# Patient Record
Sex: MALE | Race: WHITE | NOT HISPANIC OR LATINO | Employment: FULL TIME | URBAN - NONMETROPOLITAN AREA
[De-identification: names, ages, dates, MRNs, and addresses within clinical notes are randomized per-mention and may not be internally consistent; named-entity substitution may affect disease eponyms.]

---

## 2017-01-01 ENCOUNTER — HOSPITAL ENCOUNTER (OUTPATIENT)
Dept: CARDIOLOGY | Facility: HOSPITAL | Age: 77
Discharge: HOME | End: 2017-07-19
Admitting: NURSE PRACTITIONER

## 2017-01-01 ENCOUNTER — OFFICE VISIT (OUTPATIENT)
Dept: CARDIOLOGY | Facility: CLINIC | Age: 77
End: 2017-01-01

## 2017-01-01 VITALS
DIASTOLIC BLOOD PRESSURE: 52 MMHG | SYSTOLIC BLOOD PRESSURE: 105 MMHG | BODY MASS INDEX: 24.34 KG/M2 | WEIGHT: 170 LBS | HEIGHT: 70 IN

## 2017-01-01 VITALS
HEIGHT: 70 IN | DIASTOLIC BLOOD PRESSURE: 59 MMHG | SYSTOLIC BLOOD PRESSURE: 99 MMHG | OXYGEN SATURATION: 98 % | HEART RATE: 72 BPM | BODY MASS INDEX: 24.39 KG/M2 | WEIGHT: 170.4 LBS

## 2017-01-01 DIAGNOSIS — E78.2 MIXED HYPERLIPIDEMIA: ICD-10-CM

## 2017-01-01 DIAGNOSIS — Z92.21 STATUS POST CHEMOTHERAPY: ICD-10-CM

## 2017-01-01 DIAGNOSIS — I25.10 CORONARY ARTERY DISEASE INVOLVING NATIVE CORONARY ARTERY OF NATIVE HEART WITHOUT ANGINA PECTORIS: Primary | ICD-10-CM

## 2017-01-01 DIAGNOSIS — R53.83 FATIGUE, UNSPECIFIED TYPE: ICD-10-CM

## 2017-01-01 DIAGNOSIS — I48.92 ATRIAL FLUTTER, UNSPECIFIED TYPE (HCC): ICD-10-CM

## 2017-01-01 DIAGNOSIS — I25.10 CORONARY ARTERY DISEASE INVOLVING NATIVE CORONARY ARTERY OF NATIVE HEART WITHOUT ANGINA PECTORIS: ICD-10-CM

## 2017-01-01 LAB
BH CV ECHO MEAS - AO MAX PG (FULL): 3 MMHG
BH CV ECHO MEAS - AO MAX PG: 7 MMHG
BH CV ECHO MEAS - AO MEAN PG (FULL): 2 MMHG
BH CV ECHO MEAS - AO MEAN PG: 4 MMHG
BH CV ECHO MEAS - AO ROOT AREA: 11.9 CM^2
BH CV ECHO MEAS - AO ROOT DIAM: 3.9 CM
BH CV ECHO MEAS - AO V2 MAX: 132 CM/SEC
BH CV ECHO MEAS - AO V2 MEAN: 86.9 CM/SEC
BH CV ECHO MEAS - AO V2 VTI: 30.8 CM
BH CV ECHO MEAS - AVA(I,A): 2.8 CM^2
BH CV ECHO MEAS - AVA(I,D): 2.8 CM^2
BH CV ECHO MEAS - AVA(V,A): 2.6 CM^2
BH CV ECHO MEAS - AVA(V,D): 2.6 CM^2
BH CV ECHO MEAS - CONTRAST EF 4CH: 55.6 ML/M^2
BH CV ECHO MEAS - EDV(CUBED): 140.6 ML
BH CV ECHO MEAS - EDV(MOD-SP4): 98.4 ML
BH CV ECHO MEAS - EDV(TEICH): 129.5 ML
BH CV ECHO MEAS - EF(CUBED): 75.1 %
BH CV ECHO MEAS - EF(MOD-SP4): 55.6 %
BH CV ECHO MEAS - EF(TEICH): 66.7 %
BH CV ECHO MEAS - ESV(CUBED): 35 ML
BH CV ECHO MEAS - ESV(MOD-SP4): 43.7 ML
BH CV ECHO MEAS - ESV(TEICH): 43.2 ML
BH CV ECHO MEAS - FS: 37.1 %
BH CV ECHO MEAS - IVS/LVPW: 0.94
BH CV ECHO MEAS - IVSD: 1 CM
BH CV ECHO MEAS - LA DIMENSION: 3.5 CM
BH CV ECHO MEAS - LA/AO: 0.9
BH CV ECHO MEAS - LAT PEAK E' VEL: 9.9 CM/SEC
BH CV ECHO MEAS - LV MASS(C)D: 205.9 GRAMS
BH CV ECHO MEAS - LV MAX PG: 3.9 MMHG
BH CV ECHO MEAS - LV MEAN PG: 2 MMHG
BH CV ECHO MEAS - LV V1 MAX: 99 CM/SEC
BH CV ECHO MEAS - LV V1 MEAN: 67.2 CM/SEC
BH CV ECHO MEAS - LV V1 VTI: 24.7 CM
BH CV ECHO MEAS - LVIDD: 5.2 CM
BH CV ECHO MEAS - LVIDS: 3.3 CM
BH CV ECHO MEAS - LVLD AP4: 7.8 CM
BH CV ECHO MEAS - LVLS AP4: 6.9 CM
BH CV ECHO MEAS - LVOT AREA (M): 3.5 CM^2
BH CV ECHO MEAS - LVOT AREA: 3.5 CM^2
BH CV ECHO MEAS - LVOT DIAM: 2.1 CM
BH CV ECHO MEAS - LVPWD: 1.1 CM
BH CV ECHO MEAS - MV A MAX VEL: 78.6 CM/SEC
BH CV ECHO MEAS - MV DEC TIME: 0.2 SEC
BH CV ECHO MEAS - MV E MAX VEL: 90.7 CM/SEC
BH CV ECHO MEAS - MV E/A: 1.2
BH CV ECHO MEAS - PI END-D VEL: 119 CM/SEC
BH CV ECHO MEAS - RAP SYSTOLE: 5 MMHG
BH CV ECHO MEAS - RVSP: 29.8 MMHG
BH CV ECHO MEAS - SV(AO): 367.9 ML
BH CV ECHO MEAS - SV(CUBED): 105.6 ML
BH CV ECHO MEAS - SV(LVOT): 85.6 ML
BH CV ECHO MEAS - SV(MOD-SP4): 54.7 ML
BH CV ECHO MEAS - SV(TEICH): 86.3 ML
BH CV ECHO MEAS - TR MAX VEL: 249 CM/SEC
E/E' RATIO: 11.7
LEFT ATRIUM VOLUME: 54 CM3
LV EF 2D ECHO EST: 60 %

## 2017-01-01 PROCEDURE — 99213 OFFICE O/P EST LOW 20 MIN: CPT | Performed by: NURSE PRACTITIONER

## 2017-01-01 PROCEDURE — 93306 TTE W/DOPPLER COMPLETE: CPT | Performed by: INTERNAL MEDICINE

## 2017-01-01 PROCEDURE — 0399T ADULT TRANSTHORACIC ECHO COMPLETE: CPT | Performed by: INTERNAL MEDICINE

## 2017-01-01 PROCEDURE — 93306 TTE W/DOPPLER COMPLETE: CPT

## 2017-01-01 PROCEDURE — 0399T HC MYOCARDL STRAIN IMAG QUAN ASSMT PER SESS: CPT

## 2017-01-01 PROCEDURE — 93000 ELECTROCARDIOGRAM COMPLETE: CPT | Performed by: NURSE PRACTITIONER

## 2017-01-01 RX ORDER — ASPIRIN 81 MG/1
81 TABLET ORAL DAILY
COMMUNITY
End: 2018-01-01 | Stop reason: HOSPADM

## 2017-04-05 PROBLEM — I25.10 CAD (CORONARY ARTERY DISEASE): Status: ACTIVE | Noted: 2017-04-05

## 2017-06-15 PROBLEM — I48.92 ATRIAL FLUTTER (HCC): Status: ACTIVE | Noted: 2017-01-01

## 2017-06-15 NOTE — PROGRESS NOTES
"    Subjective:     Encounter Date:06/15/2017      Patient ID: Jay Jones is a 76 y.o. male.    Chief Complaint:  HPI Comments: Pt reports he is feeling well aside from some fatigue which he attributes to his prostate cancer treatment/chemo. He denies chest pain, dyspnea, edema, weight gain, palpitations. He states he was asymptomatic prior to his CABG in 2006.    Coronary Artery Disease   Presents for follow-up visit. Pertinent negatives include no chest pain, chest pressure, chest tightness, dizziness, leg swelling, muscle weakness, palpitations, shortness of breath or weight gain. The symptoms have been stable. Compliance with diet is variable. Compliance with exercise is variable. Compliance with medications is good.       The following portions of the patient's history were reviewed and updated as appropriate: allergies, current medications, past family history, past medical history, past social history, past surgical history and problem list.  BP 99/59 (BP Location: Right arm, Patient Position: Sitting, Cuff Size: Adult)  Pulse 72  Ht 70\" (177.8 cm)  Wt 170 lb 6.4 oz (77.3 kg)  SpO2 98%  BMI 24.45 kg/m2  Allergies   Allergen Reactions   • Codeine    • Percocet [Oxycodone-Acetaminophen]        Current Outpatient Prescriptions:   •  aspirin 81 MG EC tablet, Take 81 mg by mouth Daily., Disp: , Rfl:   •  CALCIUM PO, Take  by mouth., Disp: , Rfl:   •  furosemide (LASIX) 40 MG tablet, TAKE 1 TABLET BY MOUTH AS NEEDED EDEMA, Disp: , Rfl: 2  •  KLOR-CON 10 MEQ CR tablet, TAKE 1 TABLET DAILY, Disp: , Rfl: 6  •  metFORMIN XR (GLUCOPHAGE-XR) 500 MG 24 hr tablet, TAKE 1 TABLET TWICE A DAY, Disp: , Rfl: 3  •  rosuvastatin (CRESTOR) 40 MG tablet, TAKE 1 TABLET BY MOUTH EVERY NIGHT AT BEDTIME, Disp: , Rfl: 6  •  SYMBICORT 160-4.5 MCG/ACT inhaler, USE 2 PUFFS DAILY, Disp: , Rfl: 7  •  valsartan (DIOVAN) 40 MG tablet, TAKE 1 TABLET EVERY DAY, Disp: , Rfl: 3  Past Medical History:   Diagnosis Date   • Asthma    • Cancer  "   • Coronary artery disease    • HLD (hyperlipidemia)      Past Surgical History:   Procedure Laterality Date   • APPENDECTOMY     • CARDIAC CATHETERIZATION     • CORONARY ARTERY BYPASS GRAFT     • PROSTATECTOMY       Social History     Social History   • Marital status:      Spouse name: N/A   • Number of children: N/A   • Years of education: N/A     Occupational History   • Not on file.     Social History Main Topics   • Smoking status: Light Tobacco Smoker     Types: Cigars   • Smokeless tobacco: Never Used   • Alcohol use No   • Drug use: No   • Sexual activity: Defer     Other Topics Concern   • Not on file     Social History Narrative     Family History   Problem Relation Age of Onset   • Heart disease Father    • Heart attack Father    • Heart failure Father    • Heart disease Maternal Grandfather    • Alzheimer's disease Mother        Review of Systems   Constitution: Positive for weakness, malaise/fatigue and weight loss. Negative for chills, diaphoresis, fever and weight gain.   HENT: Negative for nosebleeds.    Eyes: Negative for visual disturbance.   Cardiovascular: Negative for chest pain, claudication, cyanosis, dyspnea on exertion, irregular heartbeat, leg swelling, near-syncope, orthopnea, palpitations, paroxysmal nocturnal dyspnea and syncope.   Respiratory: Negative for chest tightness, cough, hemoptysis, shortness of breath, sputum production and wheezing.    Hematologic/Lymphatic: Negative for bleeding problem.   Skin: Negative for color change and flushing.   Musculoskeletal: Negative for falls and muscle weakness.   Gastrointestinal: Negative for bloating, abdominal pain, hematemesis, hematochezia, melena, nausea and vomiting.   Genitourinary: Negative for hematuria.   Neurological: Negative for dizziness and light-headedness.   Psychiatric/Behavioral: Negative for altered mental status.         ECG 12 Lead  Date/Time: 6/15/2017 2:05 PM  Performed by: KE BARNEY  Authorized by: ECOTR  KE BELTRÁN   Comparison: compared with previous ECG from 4/11/2016  Similar to previous ECG  Rhythm: sinus rhythm               Objective:     Physical Exam   Constitutional: He is oriented to person, place, and time. He appears well-developed and well-nourished. No distress.   HENT:   Head: Normocephalic and atraumatic.   Eyes: Pupils are equal, round, and reactive to light.   Neck: Normal range of motion. Neck supple. No JVD present. No thyromegaly present.   Cardiovascular: Normal rate, regular rhythm, normal heart sounds and intact distal pulses.  Exam reveals no gallop and no friction rub.    No murmur heard.  Pulses:       Dorsalis pedis pulses are 2+ on the right side, and 2+ on the left side.   Pulmonary/Chest: Effort normal and breath sounds normal. No respiratory distress. He has no wheezes. He has no rales. He exhibits no tenderness.   Abdominal: Soft. Bowel sounds are normal. He exhibits no distension. There is no tenderness.   Musculoskeletal: Normal range of motion. He exhibits no edema.   Neurological: He is alert and oriented to person, place, and time. No cranial nerve deficit.   Skin: Skin is warm and dry. He is not diaphoretic.   Psychiatric: He has a normal mood and affect. His behavior is normal.       Lab Review:       Assessment:          Diagnosis Plan   1. Coronary artery disease involving native coronary artery of native heart without angina pectoris  Adult Transthoracic Echo Complete With Contrast  S/p CABG 2006  Stable, no angina  Negative stress echo 11/2016  Check echo due to worsening fatigue, chemotherapy treatment    2. Status post chemotherapy  Adult Transthoracic Echo Complete With Contrast  For prostate cancer    3. Fatigue, unspecified type  Adult Transthoracic Echo Complete With Contrast  He relates this to his prostate cancer treatment/chemo- has been on chemo 4 months    4. Mixed hyperlipidemia  On statin followed by pcp    5. Atrial flutter, unspecified type  Remote, s/p  cardioversion 2007, maintaining NSR          Plan:         Follow up 1 year, sooner with new or worsening symptoms.

## 2018-01-01 ENCOUNTER — INFUSION (OUTPATIENT)
Dept: ONCOLOGY | Facility: HOSPITAL | Age: 78
End: 2018-01-01
Attending: INTERNAL MEDICINE

## 2018-01-01 ENCOUNTER — HOSPITAL ENCOUNTER (INPATIENT)
Facility: HOSPITAL | Age: 78
LOS: 2 days | Discharge: HOSPICE | End: 2018-06-10
Attending: NEUROLOGICAL SURGERY | Admitting: NEUROLOGICAL SURGERY

## 2018-01-01 ENCOUNTER — OFFICE VISIT (OUTPATIENT)
Dept: ONCOLOGY | Facility: CLINIC | Age: 78
End: 2018-01-01

## 2018-01-01 ENCOUNTER — TELEPHONE (OUTPATIENT)
Dept: ONCOLOGY | Facility: CLINIC | Age: 78
End: 2018-01-01

## 2018-01-01 ENCOUNTER — APPOINTMENT (OUTPATIENT)
Dept: GENERAL RADIOLOGY | Facility: HOSPITAL | Age: 78
End: 2018-01-01

## 2018-01-01 ENCOUNTER — APPOINTMENT (OUTPATIENT)
Dept: ONCOLOGY | Facility: HOSPITAL | Age: 78
End: 2018-01-01
Attending: INTERNAL MEDICINE

## 2018-01-01 ENCOUNTER — CONSULT (OUTPATIENT)
Dept: ONCOLOGY | Facility: CLINIC | Age: 78
End: 2018-01-01

## 2018-01-01 ENCOUNTER — APPOINTMENT (OUTPATIENT)
Dept: CARDIOLOGY | Facility: HOSPITAL | Age: 78
End: 2018-01-01
Attending: INTERNAL MEDICINE

## 2018-01-01 ENCOUNTER — HOSPITAL ENCOUNTER (OUTPATIENT)
Dept: NUCLEAR MEDICINE | Facility: HOSPITAL | Age: 78
Discharge: HOME | End: 2018-05-01
Attending: INTERNAL MEDICINE

## 2018-01-01 ENCOUNTER — LAB (OUTPATIENT)
Dept: LAB | Facility: HOSPITAL | Age: 78
End: 2018-01-01
Attending: INTERNAL MEDICINE

## 2018-01-01 ENCOUNTER — APPOINTMENT (OUTPATIENT)
Dept: ULTRASOUND IMAGING | Facility: HOSPITAL | Age: 78
End: 2018-01-01

## 2018-01-01 ENCOUNTER — INFUSION (OUTPATIENT)
Dept: ONCOLOGY | Facility: HOSPITAL | Age: 78
End: 2018-01-01

## 2018-01-01 ENCOUNTER — HOSPITAL ENCOUNTER (OUTPATIENT)
Dept: CT IMAGING | Facility: HOSPITAL | Age: 78
Discharge: HOME | End: 2018-05-01
Attending: INTERNAL MEDICINE | Admitting: INTERNAL MEDICINE

## 2018-01-01 ENCOUNTER — LAB (OUTPATIENT)
Dept: LAB | Facility: HOSPITAL | Age: 78
End: 2018-01-01

## 2018-01-01 ENCOUNTER — HOSPITAL ENCOUNTER (INPATIENT)
Facility: HOSPITAL | Age: 78
LOS: 8 days | Discharge: HOME HEALTH | End: 2018-05-27
Attending: INTERNAL MEDICINE | Admitting: INTERNAL MEDICINE

## 2018-01-01 VITALS
RESPIRATION RATE: 18 BRPM | HEIGHT: 70 IN | WEIGHT: 151.5 LBS | SYSTOLIC BLOOD PRESSURE: 106 MMHG | BODY MASS INDEX: 21.69 KG/M2 | HEART RATE: 85 BPM | DIASTOLIC BLOOD PRESSURE: 68 MMHG | TEMPERATURE: 97.4 F | OXYGEN SATURATION: 98 %

## 2018-01-01 VITALS
SYSTOLIC BLOOD PRESSURE: 105 MMHG | BODY MASS INDEX: 21.76 KG/M2 | HEIGHT: 70 IN | WEIGHT: 152 LBS | DIASTOLIC BLOOD PRESSURE: 50 MMHG | RESPIRATION RATE: 18 BRPM | OXYGEN SATURATION: 100 % | HEART RATE: 69 BPM | TEMPERATURE: 97.3 F

## 2018-01-01 VITALS
WEIGHT: 139.8 LBS | TEMPERATURE: 97.3 F | DIASTOLIC BLOOD PRESSURE: 68 MMHG | BODY MASS INDEX: 20.01 KG/M2 | OXYGEN SATURATION: 89 % | SYSTOLIC BLOOD PRESSURE: 102 MMHG | RESPIRATION RATE: 16 BRPM | HEIGHT: 70 IN | HEART RATE: 100 BPM

## 2018-01-01 VITALS
HEART RATE: 73 BPM | SYSTOLIC BLOOD PRESSURE: 114 MMHG | WEIGHT: 154 LBS | OXYGEN SATURATION: 100 % | RESPIRATION RATE: 20 BRPM | BODY MASS INDEX: 22.05 KG/M2 | DIASTOLIC BLOOD PRESSURE: 47 MMHG | TEMPERATURE: 98.1 F | HEIGHT: 70 IN

## 2018-01-01 VITALS
HEART RATE: 93 BPM | HEIGHT: 70 IN | WEIGHT: 151.2 LBS | OXYGEN SATURATION: 100 % | SYSTOLIC BLOOD PRESSURE: 105 MMHG | DIASTOLIC BLOOD PRESSURE: 50 MMHG | RESPIRATION RATE: 18 BRPM | TEMPERATURE: 97.8 F | BODY MASS INDEX: 21.64 KG/M2

## 2018-01-01 VITALS
DIASTOLIC BLOOD PRESSURE: 39 MMHG | HEART RATE: 82 BPM | BODY MASS INDEX: 25.66 KG/M2 | TEMPERATURE: 97.9 F | HEIGHT: 61 IN | OXYGEN SATURATION: 94 % | SYSTOLIC BLOOD PRESSURE: 104 MMHG | WEIGHT: 135.9 LBS | RESPIRATION RATE: 18 BRPM

## 2018-01-01 VITALS
RESPIRATION RATE: 18 BRPM | BODY MASS INDEX: 21.62 KG/M2 | DIASTOLIC BLOOD PRESSURE: 85 MMHG | TEMPERATURE: 99 F | WEIGHT: 151 LBS | OXYGEN SATURATION: 95 % | HEIGHT: 70 IN | HEART RATE: 92 BPM | SYSTOLIC BLOOD PRESSURE: 113 MMHG

## 2018-01-01 VITALS
WEIGHT: 151 LBS | SYSTOLIC BLOOD PRESSURE: 116 MMHG | HEART RATE: 84 BPM | TEMPERATURE: 97.3 F | HEIGHT: 70 IN | BODY MASS INDEX: 21.62 KG/M2 | OXYGEN SATURATION: 100 % | DIASTOLIC BLOOD PRESSURE: 48 MMHG | RESPIRATION RATE: 20 BRPM

## 2018-01-01 VITALS
RESPIRATION RATE: 16 BRPM | DIASTOLIC BLOOD PRESSURE: 71 MMHG | BODY MASS INDEX: 20.62 KG/M2 | TEMPERATURE: 97.7 F | SYSTOLIC BLOOD PRESSURE: 114 MMHG | HEIGHT: 70 IN | OXYGEN SATURATION: 83 % | WEIGHT: 144 LBS | HEIGHT: 70 IN | TEMPERATURE: 97.7 F | RESPIRATION RATE: 18 BRPM | HEART RATE: 91 BPM | WEIGHT: 149 LBS | DIASTOLIC BLOOD PRESSURE: 57 MMHG | HEART RATE: 88 BPM | SYSTOLIC BLOOD PRESSURE: 105 MMHG | BODY MASS INDEX: 21.33 KG/M2 | OXYGEN SATURATION: 100 %

## 2018-01-01 VITALS
RESPIRATION RATE: 20 BRPM | TEMPERATURE: 99 F | HEART RATE: 94 BPM | HEIGHT: 70 IN | OXYGEN SATURATION: 96 % | BODY MASS INDEX: 20.59 KG/M2 | WEIGHT: 143.8 LBS | SYSTOLIC BLOOD PRESSURE: 106 MMHG | DIASTOLIC BLOOD PRESSURE: 48 MMHG

## 2018-01-01 VITALS
OXYGEN SATURATION: 100 % | WEIGHT: 149 LBS | HEIGHT: 70 IN | SYSTOLIC BLOOD PRESSURE: 106 MMHG | HEART RATE: 84 BPM | DIASTOLIC BLOOD PRESSURE: 74 MMHG | BODY MASS INDEX: 21.33 KG/M2 | TEMPERATURE: 97.8 F | RESPIRATION RATE: 18 BRPM

## 2018-01-01 VITALS
WEIGHT: 151 LBS | TEMPERATURE: 97.5 F | RESPIRATION RATE: 18 BRPM | OXYGEN SATURATION: 100 % | HEIGHT: 70 IN | SYSTOLIC BLOOD PRESSURE: 106 MMHG | HEART RATE: 73 BPM | DIASTOLIC BLOOD PRESSURE: 40 MMHG | BODY MASS INDEX: 21.62 KG/M2

## 2018-01-01 VITALS
OXYGEN SATURATION: 100 % | HEART RATE: 96 BPM | SYSTOLIC BLOOD PRESSURE: 107 MMHG | RESPIRATION RATE: 18 BRPM | BODY MASS INDEX: 21.05 KG/M2 | DIASTOLIC BLOOD PRESSURE: 40 MMHG | WEIGHT: 147 LBS | TEMPERATURE: 97.9 F | HEIGHT: 70 IN

## 2018-01-01 VITALS
HEART RATE: 84 BPM | DIASTOLIC BLOOD PRESSURE: 84 MMHG | RESPIRATION RATE: 16 BRPM | WEIGHT: 151 LBS | SYSTOLIC BLOOD PRESSURE: 126 MMHG | BODY MASS INDEX: 21.62 KG/M2 | TEMPERATURE: 97.5 F | OXYGEN SATURATION: 96 % | HEIGHT: 70 IN

## 2018-01-01 VITALS
HEART RATE: 80 BPM | BODY MASS INDEX: 19.71 KG/M2 | DIASTOLIC BLOOD PRESSURE: 58 MMHG | TEMPERATURE: 96.8 F | HEIGHT: 70 IN | SYSTOLIC BLOOD PRESSURE: 108 MMHG | WEIGHT: 137.7 LBS | RESPIRATION RATE: 16 BRPM | OXYGEN SATURATION: 94 %

## 2018-01-01 VITALS
OXYGEN SATURATION: 91 % | HEART RATE: 89 BPM | DIASTOLIC BLOOD PRESSURE: 56 MMHG | TEMPERATURE: 99.1 F | WEIGHT: 144.8 LBS | HEIGHT: 70 IN | BODY MASS INDEX: 20.73 KG/M2 | SYSTOLIC BLOOD PRESSURE: 92 MMHG | RESPIRATION RATE: 18 BRPM

## 2018-01-01 VITALS
BODY MASS INDEX: 21.11 KG/M2 | HEART RATE: 70 BPM | HEIGHT: 70 IN | RESPIRATION RATE: 18 BRPM | SYSTOLIC BLOOD PRESSURE: 94 MMHG | WEIGHT: 147.5 LBS | TEMPERATURE: 98 F | DIASTOLIC BLOOD PRESSURE: 60 MMHG

## 2018-01-01 VITALS
DIASTOLIC BLOOD PRESSURE: 50 MMHG | HEIGHT: 70 IN | OXYGEN SATURATION: 99 % | TEMPERATURE: 97.3 F | WEIGHT: 151 LBS | HEART RATE: 72 BPM | RESPIRATION RATE: 16 BRPM | BODY MASS INDEX: 21.62 KG/M2 | SYSTOLIC BLOOD PRESSURE: 96 MMHG

## 2018-01-01 VITALS
OXYGEN SATURATION: 99 % | HEART RATE: 73 BPM | DIASTOLIC BLOOD PRESSURE: 50 MMHG | SYSTOLIC BLOOD PRESSURE: 100 MMHG | RESPIRATION RATE: 16 BRPM | HEIGHT: 70 IN | TEMPERATURE: 97.1 F | BODY MASS INDEX: 19.61 KG/M2 | WEIGHT: 137 LBS

## 2018-01-01 VITALS
TEMPERATURE: 97.8 F | RESPIRATION RATE: 18 BRPM | BODY MASS INDEX: 21.45 KG/M2 | DIASTOLIC BLOOD PRESSURE: 70 MMHG | HEIGHT: 70 IN | SYSTOLIC BLOOD PRESSURE: 116 MMHG | OXYGEN SATURATION: 98 % | WEIGHT: 149.8 LBS | HEART RATE: 80 BPM

## 2018-01-01 DIAGNOSIS — C61 MALIGNANT NEOPLASM OF PROSTATE (HCC): Primary | ICD-10-CM

## 2018-01-01 DIAGNOSIS — D64.9 ANEMIA, UNSPECIFIED TYPE: ICD-10-CM

## 2018-01-01 DIAGNOSIS — C79.51 PROSTATE CANCER METASTATIC TO BONE (HCC): Primary | ICD-10-CM

## 2018-01-01 DIAGNOSIS — C79.51 PROSTATE CANCER METASTATIC TO BONE (HCC): ICD-10-CM

## 2018-01-01 DIAGNOSIS — E86.0 DEHYDRATION: ICD-10-CM

## 2018-01-01 DIAGNOSIS — C61 PROSTATE CANCER METASTATIC TO BONE (HCC): Primary | ICD-10-CM

## 2018-01-01 DIAGNOSIS — R00.2 RAPID PALPITATIONS: Primary | ICD-10-CM

## 2018-01-01 DIAGNOSIS — C61 PROSTATE CANCER METASTATIC TO BONE (HCC): ICD-10-CM

## 2018-01-01 DIAGNOSIS — T45.1X5A ANTINEOPLASTIC CHEMOTHERAPY INDUCED ANEMIA: ICD-10-CM

## 2018-01-01 DIAGNOSIS — T45.1X5A ANTINEOPLASTIC CHEMOTHERAPY INDUCED ANEMIA: Primary | ICD-10-CM

## 2018-01-01 DIAGNOSIS — E86.0 DEHYDRATION: Primary | ICD-10-CM

## 2018-01-01 DIAGNOSIS — E78.2 MIXED HYPERLIPIDEMIA: Primary | ICD-10-CM

## 2018-01-01 DIAGNOSIS — D64.81 ANTINEOPLASTIC CHEMOTHERAPY INDUCED ANEMIA: Primary | ICD-10-CM

## 2018-01-01 DIAGNOSIS — E83.51 HYPOCALCEMIA: Primary | ICD-10-CM

## 2018-01-01 DIAGNOSIS — C61 PROSTATE CA (HCC): ICD-10-CM

## 2018-01-01 DIAGNOSIS — R60.1 GENERALIZED EDEMA: ICD-10-CM

## 2018-01-01 DIAGNOSIS — R53.81 PHYSICAL DECONDITIONING: Primary | ICD-10-CM

## 2018-01-01 DIAGNOSIS — C61 MALIGNANT NEOPLASM OF PROSTATE (HCC): ICD-10-CM

## 2018-01-01 DIAGNOSIS — D64.81 ANTINEOPLASTIC CHEMOTHERAPY INDUCED ANEMIA: ICD-10-CM

## 2018-01-01 DIAGNOSIS — G93.89 BRAIN MASS: ICD-10-CM

## 2018-01-01 DIAGNOSIS — D50.9 IRON DEFICIENCY ANEMIA, UNSPECIFIED IRON DEFICIENCY ANEMIA TYPE: Primary | ICD-10-CM

## 2018-01-01 DIAGNOSIS — R00.2 HEART PALPITATIONS: ICD-10-CM

## 2018-01-01 DIAGNOSIS — C61 PROSTATE CA (HCC): Primary | ICD-10-CM

## 2018-01-01 DIAGNOSIS — D50.9 IRON DEFICIENCY ANEMIA, UNSPECIFIED IRON DEFICIENCY ANEMIA TYPE: ICD-10-CM

## 2018-01-01 DIAGNOSIS — R13.12 OROPHARYNGEAL DYSPHAGIA: Primary | ICD-10-CM

## 2018-01-01 DIAGNOSIS — D64.9 ANEMIA, UNSPECIFIED TYPE: Primary | ICD-10-CM

## 2018-01-01 LAB
ABO + RH BLD: NORMAL
ABO + RH BLD: NORMAL
ABO GROUP BLD: NORMAL
ABO GROUP BLD: NORMAL
ALBUMIN FLD-MCNC: 1.2 G/DL
ALBUMIN SERPL-MCNC: 2.5 G/DL (ref 3.5–5)
ALBUMIN SERPL-MCNC: 2.9 G/DL (ref 3.5–5)
ALBUMIN SERPL-MCNC: 3 G/DL (ref 3.5–5)
ALBUMIN SERPL-MCNC: 3 G/DL (ref 3.5–5)
ALBUMIN SERPL-MCNC: 3.2 G/DL (ref 3.5–5)
ALBUMIN SERPL-MCNC: 3.3 G/DL (ref 3.5–5)
ALBUMIN SERPL-MCNC: 3.3 G/DL (ref 3.5–5)
ALBUMIN/GLOB SERPL: 1 G/DL (ref 1.1–2.5)
ALBUMIN/GLOB SERPL: 1.1 G/DL (ref 1.1–2.5)
ALBUMIN/GLOB SERPL: 1.2 G/DL (ref 1.1–2.5)
ALBUMIN/GLOB SERPL: 1.2 G/DL (ref 1.1–2.5)
ALBUMIN/GLOB SERPL: 1.3 G/DL (ref 1.1–2.5)
ALP SERPL-CCNC: 389 U/L (ref 24–120)
ALP SERPL-CCNC: 407 U/L (ref 24–120)
ALP SERPL-CCNC: 426 U/L (ref 24–120)
ALP SERPL-CCNC: 440 U/L (ref 24–120)
ALP SERPL-CCNC: 469 U/L (ref 24–120)
ALP SERPL-CCNC: 473 U/L (ref 24–120)
ALP SERPL-CCNC: 483 U/L (ref 24–120)
ALP SERPL-CCNC: 639 U/L (ref 24–120)
ALP SERPL-CCNC: 658 U/L (ref 24–120)
ALT SERPL W P-5'-P-CCNC: 18 U/L (ref 0–54)
ALT SERPL W P-5'-P-CCNC: 21 U/L (ref 0–54)
ALT SERPL W P-5'-P-CCNC: 25 U/L (ref 0–54)
ALT SERPL W P-5'-P-CCNC: 25 U/L (ref 0–54)
ALT SERPL W P-5'-P-CCNC: 28 U/L (ref 0–54)
ALT SERPL W P-5'-P-CCNC: 30 U/L (ref 0–54)
ALT SERPL W P-5'-P-CCNC: 32 U/L (ref 0–54)
ALT SERPL W P-5'-P-CCNC: 36 U/L (ref 0–54)
ALT SERPL W P-5'-P-CCNC: 40 U/L (ref 0–54)
AMYLASE FLD-CCNC: 23 U/L
ANION GAP SERPL CALCULATED.3IONS-SCNC: 10 MMOL/L (ref 4–13)
ANION GAP SERPL CALCULATED.3IONS-SCNC: 11 MMOL/L (ref 4–13)
ANION GAP SERPL CALCULATED.3IONS-SCNC: 4 MMOL/L (ref 4–13)
ANION GAP SERPL CALCULATED.3IONS-SCNC: 4 MMOL/L (ref 4–13)
ANION GAP SERPL CALCULATED.3IONS-SCNC: 5 MMOL/L (ref 4–13)
ANION GAP SERPL CALCULATED.3IONS-SCNC: 5 MMOL/L (ref 4–13)
ANION GAP SERPL CALCULATED.3IONS-SCNC: 6 MMOL/L (ref 4–13)
ANION GAP SERPL CALCULATED.3IONS-SCNC: 8 MMOL/L (ref 4–13)
ANION GAP SERPL CALCULATED.3IONS-SCNC: 8 MMOL/L (ref 4–13)
ANION GAP SERPL CALCULATED.3IONS-SCNC: 9 MMOL/L (ref 4–13)
ANISOCYTOSIS BLD QL: ABNORMAL
APPEARANCE FLD: CLEAR
ARTERIAL PATENCY WRIST A: POSITIVE
ARTICHOKE IGE QN: 96 MG/DL (ref 0–99)
AST SERPL-CCNC: 126 U/L (ref 7–45)
AST SERPL-CCNC: 177 U/L (ref 7–45)
AST SERPL-CCNC: 212 U/L (ref 7–45)
AST SERPL-CCNC: 38 U/L (ref 7–45)
AST SERPL-CCNC: 42 U/L (ref 7–45)
AST SERPL-CCNC: 47 U/L (ref 7–45)
AST SERPL-CCNC: 49 U/L (ref 7–45)
AST SERPL-CCNC: 54 U/L (ref 7–45)
AST SERPL-CCNC: 60 U/L (ref 7–45)
ATMOSPHERIC PRESS: 752 MMHG
AUTO MIXED CELLS #: 0.8 10*3/MM3 (ref 0.1–2.6)
AUTO MIXED CELLS %: 18.1 % (ref 0.1–24)
BACTERIA FLD CULT: NORMAL
BACTERIA SPEC ANAEROBE CULT: NORMAL
BACTERIA UR QL AUTO: ABNORMAL /HPF
BASE EXCESS BLDA CALC-SCNC: -0.3 MMOL/L (ref 0–2)
BASO STIPL COARSE BLD QL SMEAR: ABNORMAL
BASOPHILS # BLD AUTO: 0.02 10*3/MM3 (ref 0–0.2)
BASOPHILS # BLD AUTO: 0.02 10*3/MM3 (ref 0–0.2)
BASOPHILS # BLD MANUAL: 0.07 10*3/MM3 (ref 0–0.2)
BASOPHILS NFR BLD AUTO: 0.3 % (ref 0–2)
BASOPHILS NFR BLD AUTO: 0.3 % (ref 0–2)
BASOPHILS NFR BLD AUTO: 1 % (ref 0–2)
BDY SITE: ABNORMAL
BH BB BLOOD EXPIRATION DATE: NORMAL
BH BB BLOOD EXPIRATION DATE: NORMAL
BH BB BLOOD TYPE BARCODE: 7300
BH BB BLOOD TYPE BARCODE: 7300
BH BB DISPENSE STATUS: NORMAL
BH BB DISPENSE STATUS: NORMAL
BH BB PRODUCT CODE: NORMAL
BH BB PRODUCT CODE: NORMAL
BH BB UNIT NUMBER: NORMAL
BH BB UNIT NUMBER: NORMAL
BH CV ECHO MEAS - AO MAX PG (FULL): 1.8 MMHG
BH CV ECHO MEAS - AO MAX PG: 7.1 MMHG
BH CV ECHO MEAS - AO MEAN PG (FULL): 1 MMHG
BH CV ECHO MEAS - AO MEAN PG: 3 MMHG
BH CV ECHO MEAS - AO ROOT AREA (BSA CORRECTED): 1.6
BH CV ECHO MEAS - AO ROOT AREA: 6.2 CM^2
BH CV ECHO MEAS - AO ROOT DIAM: 2.8 CM
BH CV ECHO MEAS - AO V2 MAX: 133 CM/SEC
BH CV ECHO MEAS - AO V2 MEAN: 78.6 CM/SEC
BH CV ECHO MEAS - AO V2 VTI: 30.9 CM
BH CV ECHO MEAS - AVA(I,A): 3.6 CM^2
BH CV ECHO MEAS - AVA(I,D): 3.6 CM^2
BH CV ECHO MEAS - AVA(V,A): 3.9 CM^2
BH CV ECHO MEAS - AVA(V,D): 3.9 CM^2
BH CV ECHO MEAS - BSA(HAYCOCK): 1.8 M^2
BH CV ECHO MEAS - BSA: 1.8 M^2
BH CV ECHO MEAS - BZI_BMI: 20.4 KILOGRAMS/M^2
BH CV ECHO MEAS - BZI_METRIC_HEIGHT: 177.8 CM
BH CV ECHO MEAS - BZI_METRIC_WEIGHT: 64.4 KG
BH CV ECHO MEAS - CONTRAST EF 4CH: 59 ML/M^2
BH CV ECHO MEAS - EDV(CUBED): 132.7 ML
BH CV ECHO MEAS - EDV(MOD-SP4): 82.2 ML
BH CV ECHO MEAS - EDV(TEICH): 123.8 ML
BH CV ECHO MEAS - EF(CUBED): 78.8 %
BH CV ECHO MEAS - EF(MOD-SP4): 59 %
BH CV ECHO MEAS - EF(TEICH): 70.8 %
BH CV ECHO MEAS - ESV(CUBED): 28.1 ML
BH CV ECHO MEAS - ESV(MOD-SP4): 33.7 ML
BH CV ECHO MEAS - ESV(TEICH): 36.2 ML
BH CV ECHO MEAS - FS: 40.4 %
BH CV ECHO MEAS - IVS/LVPW: 1.3
BH CV ECHO MEAS - IVSD: 1.2 CM
BH CV ECHO MEAS - LA DIMENSION: 3.7 CM
BH CV ECHO MEAS - LA/AO: 1.3
BH CV ECHO MEAS - LAT PEAK E' VEL: 13.8 CM/SEC
BH CV ECHO MEAS - LV DIASTOLIC VOL/BSA (35-75): 45.6 ML/M^2
BH CV ECHO MEAS - LV MASS(C)D: 206.4 GRAMS
BH CV ECHO MEAS - LV MASS(C)DI: 114.4 GRAMS/M^2
BH CV ECHO MEAS - LV MAX PG: 5.3 MMHG
BH CV ECHO MEAS - LV MEAN PG: 2 MMHG
BH CV ECHO MEAS - LV SYSTOLIC VOL/BSA (12-30): 18.7 ML/M^2
BH CV ECHO MEAS - LV V1 MAX: 115 CM/SEC
BH CV ECHO MEAS - LV V1 MEAN: 58.1 CM/SEC
BH CV ECHO MEAS - LV V1 VTI: 24.9 CM
BH CV ECHO MEAS - LVIDD: 5.1 CM
BH CV ECHO MEAS - LVIDS: 3 CM
BH CV ECHO MEAS - LVLD AP4: 6.2 CM
BH CV ECHO MEAS - LVLS AP4: 5.5 CM
BH CV ECHO MEAS - LVOT AREA (M): 4.5 CM^2
BH CV ECHO MEAS - LVOT AREA: 4.5 CM^2
BH CV ECHO MEAS - LVOT DIAM: 2.4 CM
BH CV ECHO MEAS - LVPWD: 0.94 CM
BH CV ECHO MEAS - MED PEAK E' VEL: 9.25 CM/SEC
BH CV ECHO MEAS - MV DEC TIME: 0.19 SEC
BH CV ECHO MEAS - MV E MAX VEL: 132 CM/SEC
BH CV ECHO MEAS - RAP SYSTOLE: 5 MMHG
BH CV ECHO MEAS - RVDD: 3.6 CM
BH CV ECHO MEAS - RVSP: 36.6 MMHG
BH CV ECHO MEAS - SI(AO): 105.4 ML/M^2
BH CV ECHO MEAS - SI(CUBED): 57.9 ML/M^2
BH CV ECHO MEAS - SI(LVOT): 62.4 ML/M^2
BH CV ECHO MEAS - SI(MOD-SP4): 26.9 ML/M^2
BH CV ECHO MEAS - SI(TEICH): 48.6 ML/M^2
BH CV ECHO MEAS - SV(AO): 190.3 ML
BH CV ECHO MEAS - SV(CUBED): 104.6 ML
BH CV ECHO MEAS - SV(LVOT): 112.6 ML
BH CV ECHO MEAS - SV(MOD-SP4): 48.5 ML
BH CV ECHO MEAS - SV(TEICH): 87.7 ML
BH CV ECHO MEAS - TR MAX VEL: 281 CM/SEC
BH CV ECHO MEASUREMENTS AVERAGE E/E' RATIO: 11.45
BILIRUB SERPL-MCNC: 0.3 MG/DL (ref 0.1–1)
BILIRUB SERPL-MCNC: 0.4 MG/DL (ref 0.1–1)
BILIRUB SERPL-MCNC: 0.5 MG/DL (ref 0.1–1)
BILIRUB SERPL-MCNC: 0.6 MG/DL (ref 0.1–1)
BILIRUB SERPL-MCNC: 0.7 MG/DL (ref 0.1–1)
BILIRUB UR QL STRIP: ABNORMAL
BLASTS NFR BLD MANUAL: 1 % (ref 0–0)
BLD GP AB SCN SERPL QL: NEGATIVE
BLD GP AB SCN SERPL QL: NEGATIVE
BODY TEMPERATURE: 37 C
BUN BLD-MCNC: 15 MG/DL (ref 5–21)
BUN BLD-MCNC: 17 MG/DL (ref 5–21)
BUN BLD-MCNC: 17 MG/DL (ref 5–21)
BUN BLD-MCNC: 18 MG/DL (ref 5–21)
BUN BLD-MCNC: 18 MG/DL (ref 5–21)
BUN BLD-MCNC: 19 MG/DL (ref 5–21)
BUN BLD-MCNC: 24 MG/DL (ref 5–21)
BUN BLD-MCNC: 25 MG/DL (ref 5–21)
BUN BLD-MCNC: 27 MG/DL (ref 5–21)
BUN BLD-MCNC: 27 MG/DL (ref 5–21)
BUN BLD-MCNC: 29 MG/DL (ref 5–21)
BUN BLD-MCNC: 29 MG/DL (ref 5–21)
BUN BLD-MCNC: 35 MG/DL (ref 5–21)
BUN BLD-MCNC: 35 MG/DL (ref 5–21)
BUN/CREAT SERPL: 24.6 (ref 7–25)
BUN/CREAT SERPL: 26.2
BUN/CREAT SERPL: 29.5 (ref 7–25)
BUN/CREAT SERPL: 30.4 (ref 7–25)
BUN/CREAT SERPL: 40 (ref 7–25)
BUN/CREAT SERPL: 40.4 (ref 7–25)
BUN/CREAT SERPL: 42 (ref 7–25)
BUN/CREAT SERPL: 45 (ref 7–25)
BUN/CREAT SERPL: 49 (ref 7–25)
BUN/CREAT SERPL: 49.3 (ref 7–25)
BUN/CREAT SERPL: 54.3 (ref 7–25)
BUN/CREAT SERPL: 58 (ref 7–25)
BUN/CREAT SERPL: 58.3 (ref 7–25)
BUN/CREAT SERPL: 60 (ref 7–25)
CA-I BLD-MCNC: 3.77 MG/DL (ref 4.6–5.4)
CALCIUM SPEC-SCNC: 6.6 MG/DL (ref 8.4–10.4)
CALCIUM SPEC-SCNC: 6.6 MG/DL (ref 8.4–10.4)
CALCIUM SPEC-SCNC: 6.7 MG/DL (ref 8.4–10.4)
CALCIUM SPEC-SCNC: 7 MG/DL (ref 8.4–10.4)
CALCIUM SPEC-SCNC: 7.1 MG/DL (ref 8.4–10.4)
CALCIUM SPEC-SCNC: 7.4 MG/DL (ref 8.4–10.4)
CALCIUM SPEC-SCNC: 8.1 MG/DL (ref 8.4–10.4)
CALCIUM SPEC-SCNC: 8.1 MG/DL (ref 8.4–10.4)
CALCIUM SPEC-SCNC: 8.5 MG/DL (ref 8.4–10.4)
CALCIUM SPEC-SCNC: 8.5 MG/DL (ref 8.4–10.4)
CALCIUM SPEC-SCNC: 8.6 MG/DL (ref 8.4–10.4)
CALCIUM SPEC-SCNC: 8.6 MG/DL (ref 8.4–10.4)
CHLORIDE SERPL-SCNC: 101 MMOL/L (ref 98–110)
CHLORIDE SERPL-SCNC: 101 MMOL/L (ref 98–110)
CHLORIDE SERPL-SCNC: 102 MMOL/L (ref 98–110)
CHLORIDE SERPL-SCNC: 103 MMOL/L (ref 98–110)
CHLORIDE SERPL-SCNC: 104 MMOL/L (ref 98–110)
CHLORIDE SERPL-SCNC: 94 MMOL/L (ref 98–110)
CHLORIDE SERPL-SCNC: 95 MMOL/L (ref 98–110)
CHLORIDE SERPL-SCNC: 95 MMOL/L (ref 98–110)
CHLORIDE SERPL-SCNC: 96 MMOL/L (ref 98–110)
CHLORIDE SERPL-SCNC: 97 MMOL/L (ref 98–110)
CHLORIDE SERPL-SCNC: 98 MMOL/L (ref 98–110)
CHLORIDE SERPL-SCNC: 99 MMOL/L (ref 98–110)
CHOLEST FLD-MCNC: 56 MG/DL
CHOLEST SERPL-MCNC: 159 MG/DL (ref 130–200)
CLARITY UR: CLEAR
CLUMPED PLATELETS: PRESENT
CO2 SERPL-SCNC: 25 MMOL/L (ref 24–31)
CO2 SERPL-SCNC: 26 MMOL/L (ref 24–31)
CO2 SERPL-SCNC: 27 MMOL/L (ref 24–31)
CO2 SERPL-SCNC: 28 MMOL/L (ref 24–31)
CO2 SERPL-SCNC: 29 MMOL/L (ref 24–31)
CO2 SERPL-SCNC: 29 MMOL/L (ref 24–31)
CO2 SERPL-SCNC: 30 MMOL/L (ref 24–31)
CO2 SERPL-SCNC: 30 MMOL/L (ref 24–31)
CO2 SERPL-SCNC: 31 MMOL/L (ref 24–31)
CO2 SERPL-SCNC: 33 MMOL/L (ref 24–31)
COHGB MFR BLD: 2 % (ref 0–5)
COLOR FLD: YELLOW
COLOR UR: YELLOW
CREAT BLD-MCNC: 0.45 MG/DL (ref 0.5–1.4)
CREAT BLD-MCNC: 0.45 MG/DL (ref 0.5–1.4)
CREAT BLD-MCNC: 0.46 MG/DL (ref 0.5–1.4)
CREAT BLD-MCNC: 0.47 MG/DL (ref 0.5–1.4)
CREAT BLD-MCNC: 0.49 MG/DL (ref 0.5–1.4)
CREAT BLD-MCNC: 0.5 MG/DL (ref 0.5–1.4)
CREAT BLD-MCNC: 0.56 MG/DL (ref 0.5–1.4)
CREAT BLD-MCNC: 0.6 MG/DL (ref 0.5–1.4)
CREAT BLD-MCNC: 0.6 MG/DL (ref 0.5–1.4)
CREAT BLD-MCNC: 0.61 MG/DL (ref 0.5–1.4)
CREAT BLD-MCNC: 0.61 MG/DL (ref 0.5–1.4)
CREAT BLD-MCNC: 0.65 MG/DL (ref 0.5–1.4)
CREAT BLD-MCNC: 0.69 MG/DL (ref 0.5–1.4)
CREAT BLD-MCNC: 0.71 MG/DL (ref 0.5–1.4)
CREAT BLDA-MCNC: 0.4 MG/DL (ref 0.6–1.3)
CROSSMATCH INTERPRETATION: NORMAL
CYTO UR: NORMAL
CYTOLOGIST CVX/VAG CYTO: NORMAL
D-LACTATE SERPL-SCNC: 1.3 MMOL/L (ref 0.5–2)
DACRYOCYTES BLD QL SMEAR: ABNORMAL
DEPRECATED RDW RBC AUTO: 67.9 FL (ref 40–54)
DEPRECATED RDW RBC AUTO: 69.3 FL (ref 40–54)
DEPRECATED RDW RBC AUTO: 69.4 FL (ref 40–54)
DEPRECATED RDW RBC AUTO: 70.7 FL (ref 40–54)
DEPRECATED RDW RBC AUTO: 70.8 FL (ref 40–54)
DEPRECATED RDW RBC AUTO: 71.3 FL (ref 40–54)
DEPRECATED RDW RBC AUTO: 72.4 FL (ref 40–54)
DEPRECATED RDW RBC AUTO: 72.8 FL (ref 40–54)
DEPRECATED RDW RBC AUTO: 73.3 FL (ref 40–54)
DEPRECATED RDW RBC AUTO: 73.4 FL (ref 40–54)
DEPRECATED RDW RBC AUTO: 74.1 FL (ref 40–54)
DEPRECATED RDW RBC AUTO: 74.1 FL (ref 40–54)
DEPRECATED RDW RBC AUTO: 74.2 FL (ref 40–54)
DEPRECATED RDW RBC AUTO: 76.1 FL (ref 40–54)
DIGOXIN SERPL-MCNC: 0.7 NG/ML (ref 0.8–2)
ELLIPTOCYTES BLD QL SMEAR: ABNORMAL
ELLIPTOCYTES BLD QL SMEAR: ABNORMAL
EOSINOPHIL # BLD AUTO: 0 10*3/MM3 (ref 0–0.7)
EOSINOPHIL # BLD AUTO: 0 10*3/MM3 (ref 0–0.7)
EOSINOPHIL # BLD MANUAL: 0.07 10*3/MM3 (ref 0–0.7)
EOSINOPHIL # BLD MANUAL: 0.07 10*3/MM3 (ref 0–0.7)
EOSINOPHIL # BLD MANUAL: 0.09 10*3/MM3 (ref 0–0.7)
EOSINOPHIL NFR BLD AUTO: 0 % (ref 0–4)
EOSINOPHIL NFR BLD AUTO: 0 % (ref 0–4)
EOSINOPHIL NFR BLD MANUAL: 1 % (ref 0–4)
ERYTHROCYTE [DISTWIDTH] IN BLOOD BY AUTOMATED COUNT: 19.6 % (ref 12–15)
ERYTHROCYTE [DISTWIDTH] IN BLOOD BY AUTOMATED COUNT: 20 % (ref 12–15)
ERYTHROCYTE [DISTWIDTH] IN BLOOD BY AUTOMATED COUNT: 20 % (ref 12–15)
ERYTHROCYTE [DISTWIDTH] IN BLOOD BY AUTOMATED COUNT: 20.1 % (ref 12–15)
ERYTHROCYTE [DISTWIDTH] IN BLOOD BY AUTOMATED COUNT: 20.8 % (ref 12–15)
ERYTHROCYTE [DISTWIDTH] IN BLOOD BY AUTOMATED COUNT: 21.1 % (ref 12–15)
ERYTHROCYTE [DISTWIDTH] IN BLOOD BY AUTOMATED COUNT: 21.2 % (ref 12–15)
ERYTHROCYTE [DISTWIDTH] IN BLOOD BY AUTOMATED COUNT: 21.2 % (ref 12–15)
ERYTHROCYTE [DISTWIDTH] IN BLOOD BY AUTOMATED COUNT: 21.3 % (ref 12–15)
ERYTHROCYTE [DISTWIDTH] IN BLOOD BY AUTOMATED COUNT: 21.5 % (ref 12–15)
ERYTHROCYTE [DISTWIDTH] IN BLOOD BY AUTOMATED COUNT: 21.5 % (ref 12–15)
ERYTHROCYTE [DISTWIDTH] IN BLOOD BY AUTOMATED COUNT: 21.6 % (ref 12–15)
ERYTHROCYTE [DISTWIDTH] IN BLOOD BY AUTOMATED COUNT: 21.7 % (ref 12–15)
ERYTHROCYTE [DISTWIDTH] IN BLOOD BY AUTOMATED COUNT: 21.8 % (ref 12–15)
ERYTHROCYTE [DISTWIDTH] IN BLOOD BY AUTOMATED COUNT: 21.8 % (ref 12–15)
FERRITIN SERPL-MCNC: 1040 NG/ML (ref 17.9–464)
FOLATE SERPL-MCNC: >20 NG/ML
GFR SERPL CREATININE-BSD FRML MDRD: 108 ML/MIN/1.73
GFR SERPL CREATININE-BSD FRML MDRD: 112 ML/MIN/1.73
GFR SERPL CREATININE-BSD FRML MDRD: 120 ML/MIN/1.73
GFR SERPL CREATININE-BSD FRML MDRD: 129 ML/MIN/1.73
GFR SERPL CREATININE-BSD FRML MDRD: 129 ML/MIN/1.73
GFR SERPL CREATININE-BSD FRML MDRD: 131 ML/MIN/1.73
GFR SERPL CREATININE-BSD FRML MDRD: 131 ML/MIN/1.73
GFR SERPL CREATININE-BSD FRML MDRD: 142 ML/MIN/1.73
GFR SERPL CREATININE-BSD FRML MDRD: >150 ML/MIN/1.73
GIANT PLATELETS: ABNORMAL
GIANT PLATELETS: ABNORMAL
GLOBULIN UR ELPH-MCNC: 2.2 GM/DL
GLOBULIN UR ELPH-MCNC: 2.5 GM/DL
GLOBULIN UR ELPH-MCNC: 2.6 GM/DL
GLOBULIN UR ELPH-MCNC: 2.8 GM/DL
GLOBULIN UR ELPH-MCNC: 2.9 GM/DL
GLOBULIN UR ELPH-MCNC: 3.1 GM/DL
GLUCOSE BLD-MCNC: 105 MG/DL (ref 70–100)
GLUCOSE BLD-MCNC: 108 MG/DL (ref 70–100)
GLUCOSE BLD-MCNC: 111 MG/DL (ref 70–100)
GLUCOSE BLD-MCNC: 112 MG/DL (ref 70–100)
GLUCOSE BLD-MCNC: 114 MG/DL (ref 70–100)
GLUCOSE BLD-MCNC: 115 MG/DL (ref 70–100)
GLUCOSE BLD-MCNC: 117 MG/DL (ref 70–100)
GLUCOSE BLD-MCNC: 118 MG/DL (ref 70–100)
GLUCOSE BLD-MCNC: 124 MG/DL (ref 70–100)
GLUCOSE BLD-MCNC: 129 MG/DL (ref 70–100)
GLUCOSE BLD-MCNC: 144 MG/DL (ref 70–100)
GLUCOSE BLD-MCNC: 152 MG/DL (ref 70–100)
GLUCOSE BLD-MCNC: 154 MG/DL (ref 70–100)
GLUCOSE BLD-MCNC: 97 MG/DL (ref 70–100)
GLUCOSE BLDC GLUCOMTR-MCNC: 129 MG/DL (ref 70–130)
GLUCOSE BLDC GLUCOMTR-MCNC: 135 MG/DL (ref 70–130)
GLUCOSE BLDC GLUCOMTR-MCNC: 147 MG/DL (ref 70–130)
GLUCOSE BLDC GLUCOMTR-MCNC: 150 MG/DL (ref 70–130)
GLUCOSE BLDC GLUCOMTR-MCNC: 152 MG/DL (ref 70–130)
GLUCOSE BLDC GLUCOMTR-MCNC: 158 MG/DL (ref 70–130)
GLUCOSE BLDC GLUCOMTR-MCNC: 167 MG/DL (ref 70–130)
GLUCOSE FLD-MCNC: 123 MG/DL
GLUCOSE UR STRIP-MCNC: NEGATIVE MG/DL
GRAM STN SPEC: NORMAL
GRAM STN SPEC: NORMAL
HBA1C MFR BLD: 5.3 %
HCO3 BLDA-SCNC: 23 MMOL/L (ref 20–26)
HCT VFR BLD AUTO: 25.1 % (ref 40–52)
HCT VFR BLD AUTO: 25.3 % (ref 40–52)
HCT VFR BLD AUTO: 25.9 % (ref 40–52)
HCT VFR BLD AUTO: 26.1 % (ref 40–52)
HCT VFR BLD AUTO: 26.2 % (ref 40–52)
HCT VFR BLD AUTO: 26.4 % (ref 40–52)
HCT VFR BLD AUTO: 26.5 % (ref 40–52)
HCT VFR BLD AUTO: 26.6 % (ref 40–52)
HCT VFR BLD AUTO: 26.6 % (ref 40–52)
HCT VFR BLD AUTO: 27.3 % (ref 40–52)
HCT VFR BLD AUTO: 27.5 % (ref 40–52)
HCT VFR BLD AUTO: 27.8 % (ref 40–52)
HCT VFR BLD AUTO: 29.6 % (ref 40–52)
HCT VFR BLD AUTO: 33 % (ref 40–52)
HCT VFR BLD AUTO: 33.1 % (ref 40–52)
HCT VFR BLD CALC: 23.5 % (ref 38–51)
HDLC SERPL-MCNC: 36 MG/DL
HGB BLD-MCNC: 10.1 G/DL (ref 14–18)
HGB BLD-MCNC: 10.3 G/DL (ref 14–18)
HGB BLD-MCNC: 7.8 G/DL (ref 14–18)
HGB BLD-MCNC: 7.9 G/DL (ref 14–18)
HGB BLD-MCNC: 8 G/DL (ref 14–18)
HGB BLD-MCNC: 8 G/DL (ref 14–18)
HGB BLD-MCNC: 8.1 G/DL (ref 14–18)
HGB BLD-MCNC: 8.2 G/DL (ref 14–18)
HGB BLD-MCNC: 8.2 G/DL (ref 14–18)
HGB BLD-MCNC: 8.4 G/DL (ref 14–18)
HGB BLD-MCNC: 8.5 G/DL (ref 14–18)
HGB BLD-MCNC: 8.9 G/DL (ref 14–18)
HGB BLD-MCNC: 9.3 G/DL (ref 14–18)
HGB BLDA-MCNC: 7.7 G/DL (ref 14–18)
HGB UR QL STRIP.AUTO: NEGATIVE
HOLD SPECIMEN: NORMAL
HOROWITZ INDEX BLD+IHG-RTO: 21 %
HYALINE CASTS UR QL AUTO: ABNORMAL /LPF
HYPOCHROMIA BLD QL: ABNORMAL
INR PPP: 1.12 (ref 0.91–1.09)
INR PPP: 1.17 (ref 0.91–1.09)
IRON 24H UR-MRATE: 28 MCG/DL (ref 42–180)
IRON 24H UR-MRATE: 77 MCG/DL (ref 42–180)
IRON 24H UR-MRATE: 85 MCG/DL (ref 42–180)
IRON SATN MFR SERPL: 13 % (ref 20–45)
IRON SATN MFR SERPL: 33 % (ref 20–45)
IRON SATN MFR SERPL: 37 % (ref 20–45)
KETONES UR QL STRIP: ABNORMAL
LAB AP CASE REPORT: NORMAL
LDH FLD-CCNC: 253 IU/L
LDH SERPL-CCNC: 5614 U/L (ref 265–665)
LDLC/HDLC SERPL: 2.66 {RATIO}
LEFT ATRIUM VOLUME INDEX: 16.9 ML/M2
LEFT ATRIUM VOLUME: 30.4 CM3
LEUKOCYTE ESTERASE UR QL STRIP.AUTO: ABNORMAL
LV EF 2D ECHO EST: 55 %
LYMPHOCYTES # BLD AUTO: 0.72 10*3/MM3 (ref 0.72–4.86)
LYMPHOCYTES # BLD AUTO: 0.86 10*3/MM3 (ref 0.72–4.86)
LYMPHOCYTES # BLD AUTO: 1.8 10*3/MM3 (ref 0.8–7)
LYMPHOCYTES # BLD MANUAL: 0.11 10*3/MM3 (ref 0.72–4.86)
LYMPHOCYTES # BLD MANUAL: 0.56 10*3/MM3 (ref 0.72–4.86)
LYMPHOCYTES # BLD MANUAL: 0.6 10*3/MM3 (ref 0.72–4.86)
LYMPHOCYTES # BLD MANUAL: 0.64 10*3/MM3 (ref 0.8–7)
LYMPHOCYTES # BLD MANUAL: 0.65 10*3/MM3 (ref 0.72–4.86)
LYMPHOCYTES # BLD MANUAL: 0.78 10*3/MM3 (ref 0.72–4.86)
LYMPHOCYTES # BLD MANUAL: 1.11 10*3/MM3 (ref 0.72–4.86)
LYMPHOCYTES # BLD MANUAL: 1.12 10*3/MM3 (ref 0.72–4.86)
LYMPHOCYTES # BLD MANUAL: 1.25 10*3/MM3 (ref 0.72–4.86)
LYMPHOCYTES # BLD MANUAL: 1.26 10*3/MM3 (ref 0.72–4.86)
LYMPHOCYTES NFR BLD AUTO: 13.5 % (ref 15–45)
LYMPHOCYTES NFR BLD AUTO: 41.4 % (ref 15–45)
LYMPHOCYTES NFR BLD AUTO: 9.4 % (ref 15–45)
LYMPHOCYTES NFR BLD MANUAL: 1 % (ref 15–45)
LYMPHOCYTES NFR BLD MANUAL: 1 % (ref 4–12)
LYMPHOCYTES NFR BLD MANUAL: 12 % (ref 15–45)
LYMPHOCYTES NFR BLD MANUAL: 12.2 % (ref 15–45)
LYMPHOCYTES NFR BLD MANUAL: 15.8 % (ref 15–45)
LYMPHOCYTES NFR BLD MANUAL: 16 % (ref 15–45)
LYMPHOCYTES NFR BLD MANUAL: 17 % (ref 15–45)
LYMPHOCYTES NFR BLD MANUAL: 2 % (ref 4–12)
LYMPHOCYTES NFR BLD MANUAL: 5.1 % (ref 4–12)
LYMPHOCYTES NFR BLD MANUAL: 5.2 % (ref 0–12)
LYMPHOCYTES NFR BLD MANUAL: 6 % (ref 15–45)
LYMPHOCYTES NFR BLD MANUAL: 6.3 % (ref 4–12)
LYMPHOCYTES NFR BLD MANUAL: 7 % (ref 4–12)
LYMPHOCYTES NFR BLD MANUAL: 8 % (ref 15–45)
LYMPHOCYTES NFR BLD MANUAL: 8.1 % (ref 15–45)
LYMPHOCYTES NFR BLD MANUAL: 8.1 % (ref 4–12)
LYMPHOCYTES NFR BLD MANUAL: 9.4 % (ref 24–44)
LYMPHOCYTES NFR FLD MANUAL: 25 %
Lab: ABNORMAL
Lab: NORMAL
MAGNESIUM SERPL-MCNC: 2.1 MG/DL (ref 1.4–2.2)
MAGNESIUM SERPL-MCNC: 2.1 MG/DL (ref 1.4–2.2)
MAGNESIUM SERPL-MCNC: 2.2 MG/DL (ref 1.4–2.2)
MAGNESIUM SERPL-MCNC: 2.3 MG/DL (ref 1.4–2.2)
MAXIMAL PREDICTED HEART RATE: 145 BPM
MCH RBC QN AUTO: 29.1 PG (ref 28–32)
MCH RBC QN AUTO: 29.2 PG (ref 28–32)
MCH RBC QN AUTO: 29.2 PG (ref 28–32)
MCH RBC QN AUTO: 29.3 PG (ref 28–32)
MCH RBC QN AUTO: 29.5 PG (ref 28–32)
MCH RBC QN AUTO: 29.7 PG (ref 28–32)
MCH RBC QN AUTO: 29.7 PG (ref 28–32)
MCH RBC QN AUTO: 29.8 PG (ref 28–32)
MCH RBC QN AUTO: 29.9 PG (ref 28–32)
MCH RBC QN AUTO: 30.2 PG (ref 28–32)
MCH RBC QN AUTO: 30.4 PG (ref 28–32)
MCH RBC QN AUTO: 30.5 PG (ref 28–32)
MCHC RBC AUTO-ENTMCNC: 30.6 G/DL (ref 33–36)
MCHC RBC AUTO-ENTMCNC: 30.7 G/DL (ref 33–36)
MCHC RBC AUTO-ENTMCNC: 30.8 G/DL (ref 33–36)
MCHC RBC AUTO-ENTMCNC: 30.9 G/DL (ref 33–36)
MCHC RBC AUTO-ENTMCNC: 31.1 G/DL (ref 33–36)
MCHC RBC AUTO-ENTMCNC: 31.2 G/DL (ref 33–36)
MCHC RBC AUTO-ENTMCNC: 31.3 G/DL (ref 33–36)
MCHC RBC AUTO-ENTMCNC: 31.4 G/DL (ref 33–36)
MCHC RBC AUTO-ENTMCNC: 31.8 G/DL (ref 33–36)
MCHC RBC AUTO-ENTMCNC: 32 G/DL (ref 33–36)
MCHC RBC AUTO-ENTMCNC: 32.4 G/DL (ref 33–36)
MCV RBC AUTO: 93.2 FL (ref 82–95)
MCV RBC AUTO: 94 FL (ref 82–95)
MCV RBC AUTO: 94.5 FL (ref 82–95)
MCV RBC AUTO: 94.9 FL (ref 82–95)
MCV RBC AUTO: 95.1 FL (ref 82–95)
MCV RBC AUTO: 95.1 FL (ref 82–95)
MCV RBC AUTO: 95.3 FL (ref 82–95)
MCV RBC AUTO: 95.5 FL (ref 82–95)
MCV RBC AUTO: 95.7 FL (ref 82–95)
MCV RBC AUTO: 95.7 FL (ref 82–95)
MCV RBC AUTO: 95.8 FL (ref 82–95)
MCV RBC AUTO: 97.5 FL (ref 82–95)
MCV RBC AUTO: 97.8 FL (ref 82–95)
METAMYELOCYTES NFR BLD MANUAL: 1 % (ref 0–0)
METAMYELOCYTES NFR BLD MANUAL: 2 % (ref 0–0)
METAMYELOCYTES NFR BLD MANUAL: 2 % (ref 0–0)
METAMYELOCYTES NFR BLD MANUAL: 2.1 % (ref 0–0)
METAMYELOCYTES NFR BLD MANUAL: 3.1 % (ref 0–0)
METAMYELOCYTES NFR BLD MANUAL: 3.1 % (ref 0–0)
METAMYELOCYTES NFR BLD MANUAL: 6 % (ref 0–0)
METHGB BLD QL: 0.9 % (ref 0–3)
MICROCYTES BLD QL: ABNORMAL
MODALITY: ABNORMAL
MONOCYTES # BLD AUTO: 0.05 10*3/MM3 (ref 0.19–1.3)
MONOCYTES # BLD AUTO: 0.09 10*3/MM3 (ref 0.19–1.3)
MONOCYTES # BLD AUTO: 0.11 10*3/MM3 (ref 0.19–1.3)
MONOCYTES # BLD AUTO: 0.21 10*3/MM3 (ref 0.19–1.3)
MONOCYTES # BLD AUTO: 0.36 10*3/MM3 (ref 0–1)
MONOCYTES # BLD AUTO: 0.44 10*3/MM3 (ref 0.19–1.3)
MONOCYTES # BLD AUTO: 0.49 10*3/MM3 (ref 0.19–1.3)
MONOCYTES # BLD AUTO: 0.55 10*3/MM3 (ref 0.19–1.3)
MONOCYTES # BLD AUTO: 0.56 10*3/MM3 (ref 0.19–1.3)
MONOCYTES # BLD AUTO: 0.6 10*3/MM3 (ref 0.19–1.3)
MONOCYTES # BLD AUTO: 0.79 10*3/MM3 (ref 0.19–1.3)
MONOCYTES NFR BLD AUTO: 10.3 % (ref 4–12)
MONOCYTES NFR BLD AUTO: 8.8 % (ref 4–12)
MONOCYTES NFR FLD: 4 %
MUCOUS THREADS URNS QL MICRO: ABNORMAL /HPF
MYELOCYTES NFR BLD MANUAL: 1 % (ref 0–0)
MYELOCYTES NFR BLD MANUAL: 1 % (ref 0–0)
MYELOCYTES NFR BLD MANUAL: 2 % (ref 0–0)
MYELOCYTES NFR BLD MANUAL: 3 % (ref 0–0)
MYELOCYTES NFR BLD MANUAL: 3.1 % (ref 0–0)
MYELOCYTES NFR BLD MANUAL: 3.1 % (ref 0–0)
NEUTROPHILS # BLD AUTO: 1.8 10*3/MM3 (ref 1.5–8.3)
NEUTROPHILS # BLD AUTO: 4.65 10*3/MM3 (ref 1.87–8.4)
NEUTROPHILS # BLD AUTO: 5.43 10*3/MM3 (ref 1–11)
NEUTROPHILS # BLD AUTO: 5.49 10*3/MM3 (ref 1.87–8.4)
NEUTROPHILS # BLD AUTO: 5.69 10*3/MM3 (ref 1.87–8.4)
NEUTROPHILS # BLD AUTO: 5.87 10*3/MM3 (ref 1.87–8.4)
NEUTROPHILS # BLD AUTO: 5.9 10*3/MM3 (ref 1.87–8.4)
NEUTROPHILS # BLD AUTO: 6.01 10*3/MM3 (ref 1.87–8.4)
NEUTROPHILS # BLD AUTO: 7.46 10*3/MM3 (ref 1.87–8.4)
NEUTROPHILS # BLD AUTO: 8.63 10*3/MM3 (ref 1.87–8.4)
NEUTROPHILS # BLD AUTO: 9.09 10*3/MM3 (ref 1.87–8.4)
NEUTROPHILS # BLD AUTO: 9.48 10*3/MM3 (ref 1.87–8.4)
NEUTROPHILS # BLD AUTO: ABNORMAL 10*3/MM3 (ref 1.87–8.4)
NEUTROPHILS NFR BLD AUTO: 40.5 % (ref 39–78)
NEUTROPHILS NFR BLD AUTO: 72.7 % (ref 39–78)
NEUTROPHILS NFR BLD AUTO: 76.4 % (ref 39–78)
NEUTROPHILS NFR BLD MANUAL: 62 % (ref 39–78)
NEUTROPHILS NFR BLD MANUAL: 69.5 % (ref 39–78)
NEUTROPHILS NFR BLD MANUAL: 73 % (ref 39–78)
NEUTROPHILS NFR BLD MANUAL: 75 % (ref 39–78)
NEUTROPHILS NFR BLD MANUAL: 77 % (ref 39–78)
NEUTROPHILS NFR BLD MANUAL: 77.6 % (ref 39–78)
NEUTROPHILS NFR BLD MANUAL: 78.1 % (ref 37–80)
NEUTROPHILS NFR BLD MANUAL: 79.8 % (ref 39–78)
NEUTROPHILS NFR BLD MANUAL: 82.7 % (ref 39–78)
NEUTROPHILS NFR BLD MANUAL: 84 % (ref 39–78)
NEUTROPHILS NFR FLD MANUAL: 40 %
NEUTS BAND NFR BLD MANUAL: 1 % (ref 0–10)
NEUTS BAND NFR BLD MANUAL: 1 % (ref 0–10)
NEUTS BAND NFR BLD MANUAL: 1 % (ref 0–5)
NEUTS BAND NFR BLD MANUAL: 11 % (ref 0–10)
NEUTS BAND NFR BLD MANUAL: 3 % (ref 0–10)
NEUTS BAND NFR BLD MANUAL: 4.1 % (ref 0–10)
NEUTS BAND NFR BLD MANUAL: 5 % (ref 0–10)
NEUTS BAND NFR BLD MANUAL: 5 % (ref 0–10)
NEUTS BAND NFR BLD MANUAL: 8.4 % (ref 0–10)
NEUTS BAND NFR BLD MANUAL: 9 % (ref 0–10)
NITRITE UR QL STRIP: NEGATIVE
NRBC BLD MANUAL-RTO: 3.6 /100 WBC (ref 0–0)
NRBC BLD MANUAL-RTO: 3.8 /100 WBC (ref 0–0)
NRBC BLD MANUAL-RTO: 4.8 /100 WBC (ref 0–0)
NRBC SPEC MANUAL: 13 /100 WBC (ref 0–0)
NRBC SPEC MANUAL: 2 /100 WBC (ref 0–0)
NRBC SPEC MANUAL: 3 /100 WBC (ref 0–0)
NRBC SPEC MANUAL: 5 /100 WBC (ref 0–0)
NRBC SPEC MANUAL: 5.1 /100 WBC (ref 0–0)
NRBC SPEC MANUAL: 6.3 /100 WBC (ref 0–0)
NRBC SPEC MANUAL: 6.3 /100 WBC (ref 0–0)
NRBC SPEC MANUAL: 8 /100 WBC (ref 0–0)
OVALOCYTES BLD QL SMEAR: ABNORMAL
OXYHGB MFR BLDV: 92.2 % (ref 94–99)
PATH INTERP BLD-IMP: NORMAL
PATH REPORT.FINAL DX SPEC: NORMAL
PATH REPORT.GROSS SPEC: NORMAL
PCO2 BLDA: 31 MM HG (ref 35–45)
PCO2 TEMP ADJ BLD: ABNORMAL MM HG (ref 35–45)
PH BLDA: 7.48 PH UNITS (ref 7.35–7.45)
PH FLD: 7.8 [PH]
PH UR STRIP.AUTO: 6 [PH] (ref 5–8)
PH, TEMP CORRECTED: ABNORMAL PH UNITS (ref 7.35–7.45)
PHOSPHATE SERPL-MCNC: 2.9 MG/DL (ref 2.5–4.5)
PLAT MORPH BLD: NORMAL
PLATELET # BLD AUTO: 103 10*3/MM3 (ref 130–400)
PLATELET # BLD AUTO: 103 10*3/MM3 (ref 130–400)
PLATELET # BLD AUTO: 107 10*3/MM3 (ref 130–400)
PLATELET # BLD AUTO: 112 10*3/MM3 (ref 130–400)
PLATELET # BLD AUTO: 112 10*3/MM3 (ref 130–400)
PLATELET # BLD AUTO: 116 10*3/MM3 (ref 130–400)
PLATELET # BLD AUTO: 117 10*3/MM3 (ref 130–400)
PLATELET # BLD AUTO: 121 10*3/MM3 (ref 130–400)
PLATELET # BLD AUTO: 123 10*3/MM3 (ref 130–400)
PLATELET # BLD AUTO: 132 10*3/MM3 (ref 130–400)
PLATELET # BLD AUTO: 142 10*3/MM3 (ref 130–400)
PLATELET # BLD AUTO: 144 10*3/MM3 (ref 130–400)
PLATELET # BLD AUTO: 175 10*3/MM3 (ref 130–400)
PLATELET # BLD AUTO: 203 10*3/MM3 (ref 130–400)
PLATELET # BLD AUTO: 83 10*3/MM3 (ref 130–400)
PMV BLD AUTO: 10 FL (ref 6–12)
PMV BLD AUTO: 10.3 FL (ref 6–12)
PMV BLD AUTO: 8.1 FL (ref 6–12)
PMV BLD AUTO: 8.8 FL (ref 6–12)
PMV BLD AUTO: 8.9 FL (ref 6–12)
PMV BLD AUTO: 9 FL (ref 6–12)
PMV BLD AUTO: 9.3 FL (ref 6–12)
PMV BLD AUTO: 9.5 FL (ref 6–12)
PMV BLD AUTO: 9.5 FL (ref 6–12)
PMV BLD AUTO: 9.7 FL (ref 6–12)
PMV BLD AUTO: 9.7 FL (ref 6–12)
PMV BLD AUTO: 9.8 FL (ref 6–12)
PMV BLD AUTO: 9.8 FL (ref 6–12)
PO2 BLDA: 66.7 MM HG (ref 83–108)
PO2 TEMP ADJ BLD: ABNORMAL MM HG (ref 83–108)
POIKILOCYTOSIS BLD QL SMEAR: ABNORMAL
POLYCHROMASIA BLD QL SMEAR: ABNORMAL
POTASSIUM BLD-SCNC: 3.3 MMOL/L (ref 3.5–5.3)
POTASSIUM BLD-SCNC: 3.4 MMOL/L (ref 3.5–5.3)
POTASSIUM BLD-SCNC: 3.4 MMOL/L (ref 3.5–5.3)
POTASSIUM BLD-SCNC: 3.5 MMOL/L (ref 3.5–5.3)
POTASSIUM BLD-SCNC: 3.8 MMOL/L (ref 3.5–5.3)
POTASSIUM BLD-SCNC: 3.9 MMOL/L (ref 3.5–5.3)
POTASSIUM BLD-SCNC: 4 MMOL/L (ref 3.5–5.3)
POTASSIUM BLD-SCNC: 4.1 MMOL/L (ref 3.5–5.3)
POTASSIUM BLD-SCNC: 4.3 MMOL/L (ref 3.5–5.3)
POTASSIUM BLD-SCNC: 4.3 MMOL/L (ref 3.5–5.3)
POTASSIUM BLD-SCNC: 4.7 MMOL/L (ref 3.5–5.3)
POTASSIUM BLDA-SCNC: 4 MMOL/L (ref 3.5–5.2)
PROMYELOCYTES NFR BLD MANUAL: 1 % (ref 0–0)
PROMYELOCYTES NFR BLD MANUAL: 2 % (ref 0–0)
PROT FLD-MCNC: 2.2 G/DL
PROT SERPL-MCNC: 4.7 G/DL (ref 6.3–8.7)
PROT SERPL-MCNC: 5.4 G/DL (ref 6.3–8.7)
PROT SERPL-MCNC: 5.8 G/DL (ref 6.3–8.7)
PROT SERPL-MCNC: 5.8 G/DL (ref 6.3–8.7)
PROT SERPL-MCNC: 5.9 G/DL (ref 6.3–8.7)
PROT SERPL-MCNC: 6 G/DL (ref 6.3–8.7)
PROT SERPL-MCNC: 6.1 G/DL (ref 6.3–8.7)
PROT SERPL-MCNC: 6.1 G/DL (ref 6.3–8.7)
PROT SERPL-MCNC: 6.3 G/DL (ref 6.3–8.7)
PROT UR QL STRIP: ABNORMAL
PROTHROMBIN TIME: 14.8 SECONDS (ref 11.9–14.6)
PROTHROMBIN TIME: 15.3 SECONDS (ref 11.9–14.6)
PSA SERPL-MCNC: 1050 NG/ML (ref 0–4)
PSA SERPL-MCNC: 1090 NG/ML (ref 0–4)
PSA SERPL-MCNC: 1110 NG/ML (ref 0–4)
RBC # BLD AUTO: 2.62 10*6/MM3 (ref 4.8–5.9)
RBC # BLD AUTO: 2.66 10*6/MM3 (ref 4.8–5.9)
RBC # BLD AUTO: 2.71 10*6/MM3 (ref 4.8–5.9)
RBC # BLD AUTO: 2.74 10*6/MM3 (ref 4.8–5.9)
RBC # BLD AUTO: 2.74 10*6/MM3 (ref 4.8–5.9)
RBC # BLD AUTO: 2.75 10*6/MM3 (ref 4.8–5.9)
RBC # BLD AUTO: 2.76 10*6/MM3 (ref 4.8–5.9)
RBC # BLD AUTO: 2.78 10*6/MM3 (ref 4.8–5.9)
RBC # BLD AUTO: 2.79 10*6/MM3 (ref 4.8–5.9)
RBC # BLD AUTO: 2.85 10*6/MM3 (ref 4.8–5.9)
RBC # BLD AUTO: 2.86 10*6/MM3 (ref 4.8–5.9)
RBC # BLD AUTO: 2.95 10*6/MM3 (ref 4.2–5.4)
RBC # BLD AUTO: 3.12 10*6/MM3 (ref 4.8–5.9)
RBC # BLD AUTO: 3.47 10*6/MM3 (ref 4.8–5.9)
RBC # BLD AUTO: 3.52 10*6/MM3 (ref 4.8–5.9)
RBC # FLD AUTO: 1000 /MM3
RBC # UR: ABNORMAL /HPF
REF LAB TEST METHOD: ABNORMAL
RH BLD: POSITIVE
RH BLD: POSITIVE
SAO2 % BLDCOA: 94.9 % (ref 94–99)
SCHISTOCYTES BLD QL SMEAR: ABNORMAL
SCHISTOCYTES BLD QL SMEAR: ABNORMAL
SMALL PLATELETS BLD QL SMEAR: ABNORMAL
SMALL PLATELETS BLD QL SMEAR: ADEQUATE
SMUDGE CELLS BLD QL SMEAR: ABNORMAL
SMUDGE CELLS BLD QL SMEAR: ABNORMAL
SODIUM BLD-SCNC: 129 MMOL/L (ref 135–145)
SODIUM BLD-SCNC: 130 MMOL/L (ref 135–145)
SODIUM BLD-SCNC: 132 MMOL/L (ref 135–145)
SODIUM BLD-SCNC: 132 MMOL/L (ref 135–145)
SODIUM BLD-SCNC: 133 MMOL/L (ref 135–145)
SODIUM BLD-SCNC: 134 MMOL/L (ref 135–145)
SODIUM BLD-SCNC: 136 MMOL/L (ref 135–145)
SODIUM BLD-SCNC: 137 MMOL/L (ref 135–145)
SODIUM BLD-SCNC: 137 MMOL/L (ref 135–145)
SODIUM BLD-SCNC: 139 MMOL/L (ref 135–145)
SODIUM BLD-SCNC: 141 MMOL/L (ref 135–145)
SODIUM BLD-SCNC: 141 MMOL/L (ref 135–145)
SODIUM BLDA-SCNC: 136 MMOL/L (ref 136–145)
SP GR UR STRIP: 1.02 (ref 1–1.03)
SQUAMOUS #/AREA URNS HPF: ABNORMAL /HPF
STRESS TARGET HR: 123 BPM
T&S EXPIRATION DATE: NORMAL
TESTOST FREE SERPL-MCNC: 0.9 PG/ML (ref 6.6–18.1)
TESTOST SERPL-MCNC: <3 NG/DL (ref 264–916)
TIBC SERPL-MCNC: 207 MCG/DL (ref 225–420)
TIBC SERPL-MCNC: 223 MCG/DL (ref 225–420)
TIBC SERPL-MCNC: 256 MCG/DL (ref 225–420)
TRIGL FLD-MCNC: 17 MG/DL
TRIGL SERPL-MCNC: 136 MG/DL (ref 0–149)
TSH SERPL DL<=0.05 MIU/L-ACNC: 0.92 MIU/ML (ref 0.47–4.68)
UNCLASSIFIED CELLS, FLUID: 31 %
UNIT  ABO: NORMAL
UNIT  ABO: NORMAL
UNIT  RH: NORMAL
UNIT  RH: NORMAL
UROBILINOGEN UR QL STRIP: ABNORMAL
VARIANT LYMPHS NFR BLD MANUAL: 1 % (ref 0–5)
VARIANT LYMPHS NFR BLD MANUAL: 1 % (ref 0–5)
VARIANT LYMPHS NFR BLD MANUAL: 3.1 % (ref 0–5)
VARIANT LYMPHS NFR BLD MANUAL: 5 % (ref 0–5)
VENTILATOR MODE: ABNORMAL
VIT B12 BLD-MCNC: 968 PG/ML (ref 239–931)
WBC # FLD: 105 /MM3
WBC MORPH BLD: NORMAL
WBC NRBC COR # BLD: 10.53 10*3/MM3 (ref 4.8–10.8)
WBC NRBC COR # BLD: 10.86 10*3/MM3 (ref 4.8–10.8)
WBC NRBC COR # BLD: 10.9 10*3/MM3 (ref 4.8–10.8)
WBC NRBC COR # BLD: 4.4 10*3/MM3 (ref 4.8–10.8)
WBC NRBC COR # BLD: 4.88 10*3/MM3 (ref 4.8–10.8)
WBC NRBC COR # BLD: 6.39 10*3/MM3 (ref 4.8–10.8)
WBC NRBC COR # BLD: 6.86 10*3/MM3 (ref 4.8–10.8)
WBC NRBC COR # BLD: 6.94 10*3/MM3 (ref 4.8–10.8)
WBC NRBC COR # BLD: 7.05 10*3/MM3 (ref 4.8–10.8)
WBC NRBC COR # BLD: 7.38 10*3/MM3 (ref 4.8–10.8)
WBC NRBC COR # BLD: 7.44 10*3/MM3 (ref 4.8–10.8)
WBC NRBC COR # BLD: 7.65 10*3/MM3 (ref 4.8–10.8)
WBC NRBC COR # BLD: 7.68 10*3/MM3 (ref 4.8–10.8)
WBC NRBC COR # BLD: 8.34 10*3/MM3 (ref 4.8–10.8)
WBC NRBC COR # BLD: 9.14 10*3/MM3 (ref 4.8–10.8)
WBC UR QL AUTO: ABNORMAL /HPF
YEAST URNS QL MICRO: ABNORMAL /HPF

## 2018-01-01 PROCEDURE — 87205 SMEAR GRAM STAIN: CPT | Performed by: INTERNAL MEDICINE

## 2018-01-01 PROCEDURE — 25010000002 HYDROCORTISONE SODIUM SUCCINATE 100 MG RECONSTITUTED SOLUTION: Performed by: INTERNAL MEDICINE

## 2018-01-01 PROCEDURE — 96413 CHEMO IV INFUSION 1 HR: CPT

## 2018-01-01 PROCEDURE — 85060 BLOOD SMEAR INTERPRETATION: CPT

## 2018-01-01 PROCEDURE — 94799 UNLISTED PULMONARY SVC/PX: CPT

## 2018-01-01 PROCEDURE — 86923 COMPATIBILITY TEST ELECTRIC: CPT

## 2018-01-01 PROCEDURE — 80053 COMPREHEN METABOLIC PANEL: CPT

## 2018-01-01 PROCEDURE — 76604 US EXAM CHEST: CPT

## 2018-01-01 PROCEDURE — 83735 ASSAY OF MAGNESIUM: CPT | Performed by: INTERNAL MEDICINE

## 2018-01-01 PROCEDURE — 25010000002 IRON SUCROSE PER 1 MG: Performed by: INTERNAL MEDICINE

## 2018-01-01 PROCEDURE — 82150 ASSAY OF AMYLASE: CPT | Performed by: INTERNAL MEDICINE

## 2018-01-01 PROCEDURE — 85027 COMPLETE CBC AUTOMATED: CPT | Performed by: INTERNAL MEDICINE

## 2018-01-01 PROCEDURE — 81001 URINALYSIS AUTO W/SCOPE: CPT | Performed by: NEUROLOGICAL SURGERY

## 2018-01-01 PROCEDURE — 96365 THER/PROPH/DIAG IV INF INIT: CPT

## 2018-01-01 PROCEDURE — 99223 1ST HOSP IP/OBS HIGH 75: CPT | Performed by: INTERNAL MEDICINE

## 2018-01-01 PROCEDURE — 89051 BODY FLUID CELL COUNT: CPT | Performed by: INTERNAL MEDICINE

## 2018-01-01 PROCEDURE — 82565 ASSAY OF CREATININE: CPT

## 2018-01-01 PROCEDURE — 96375 TX/PRO/DX INJ NEW DRUG ADDON: CPT

## 2018-01-01 PROCEDURE — 63710000001 PREDNISONE PER 5 MG: Performed by: INTERNAL MEDICINE

## 2018-01-01 PROCEDURE — 86900 BLOOD TYPING SEROLOGIC ABO: CPT

## 2018-01-01 PROCEDURE — 99232 SBSQ HOSP IP/OBS MODERATE 35: CPT | Performed by: INTERNAL MEDICINE

## 2018-01-01 PROCEDURE — 85007 BL SMEAR W/DIFF WBC COUNT: CPT

## 2018-01-01 PROCEDURE — 80061 LIPID PANEL: CPT | Performed by: INTERNAL MEDICINE

## 2018-01-01 PROCEDURE — 99223 1ST HOSP IP/OBS HIGH 75: CPT | Performed by: NEUROLOGICAL SURGERY

## 2018-01-01 PROCEDURE — 85025 COMPLETE CBC W/AUTO DIFF WBC: CPT | Performed by: NURSE PRACTITIONER

## 2018-01-01 PROCEDURE — 80053 COMPREHEN METABOLIC PANEL: CPT | Performed by: NEUROLOGICAL SURGERY

## 2018-01-01 PROCEDURE — 94640 AIRWAY INHALATION TREATMENT: CPT

## 2018-01-01 PROCEDURE — 84153 ASSAY OF PSA TOTAL: CPT | Performed by: INTERNAL MEDICINE

## 2018-01-01 PROCEDURE — 87206 SMEAR FLUORESCENT/ACID STAI: CPT | Performed by: INTERNAL MEDICINE

## 2018-01-01 PROCEDURE — 88312 SPECIAL STAINS GROUP 1: CPT | Performed by: INTERNAL MEDICINE

## 2018-01-01 PROCEDURE — 36415 COLL VENOUS BLD VENIPUNCTURE: CPT

## 2018-01-01 PROCEDURE — 96374 THER/PROPH/DIAG INJ IV PUSH: CPT

## 2018-01-01 PROCEDURE — 85025 COMPLETE CBC W/AUTO DIFF WBC: CPT

## 2018-01-01 PROCEDURE — 36430 TRANSFUSION BLD/BLD COMPNT: CPT

## 2018-01-01 PROCEDURE — 85025 COMPLETE CBC W/AUTO DIFF WBC: CPT | Performed by: NEUROLOGICAL SURGERY

## 2018-01-01 PROCEDURE — 25010000002 DEXAMETHASONE PER 1 MG: Performed by: NEUROLOGICAL SURGERY

## 2018-01-01 PROCEDURE — 96367 TX/PROPH/DG ADDL SEQ IV INF: CPT

## 2018-01-01 PROCEDURE — 25010000002 DOCETAXEL 20 MG/ML CONCENTRATION 8 ML VIAL: Performed by: INTERNAL MEDICINE

## 2018-01-01 PROCEDURE — 83605 ASSAY OF LACTIC ACID: CPT | Performed by: NEUROLOGICAL SURGERY

## 2018-01-01 PROCEDURE — 71260 CT THORAX DX C+: CPT

## 2018-01-01 PROCEDURE — 25010000002 DIGOXIN PER 500 MCG: Performed by: INTERNAL MEDICINE

## 2018-01-01 PROCEDURE — 82962 GLUCOSE BLOOD TEST: CPT

## 2018-01-01 PROCEDURE — 93005 ELECTROCARDIOGRAM TRACING: CPT | Performed by: INTERNAL MEDICINE

## 2018-01-01 PROCEDURE — 94760 N-INVAS EAR/PLS OXIMETRY 1: CPT

## 2018-01-01 PROCEDURE — 25010000002 IOPAMIDOL 61 % SOLUTION: Performed by: INTERNAL MEDICINE

## 2018-01-01 PROCEDURE — 84403 ASSAY OF TOTAL TESTOSTERONE: CPT | Performed by: INTERNAL MEDICINE

## 2018-01-01 PROCEDURE — 93306 TTE W/DOPPLER COMPLETE: CPT

## 2018-01-01 PROCEDURE — 71046 X-RAY EXAM CHEST 2 VIEWS: CPT

## 2018-01-01 PROCEDURE — 85610 PROTHROMBIN TIME: CPT | Performed by: INTERNAL MEDICINE

## 2018-01-01 PROCEDURE — 88305 TISSUE EXAM BY PATHOLOGIST: CPT | Performed by: INTERNAL MEDICINE

## 2018-01-01 PROCEDURE — 99232 SBSQ HOSP IP/OBS MODERATE 35: CPT | Performed by: NEUROLOGICAL SURGERY

## 2018-01-01 PROCEDURE — 84157 ASSAY OF PROTEIN OTHER: CPT | Performed by: INTERNAL MEDICINE

## 2018-01-01 PROCEDURE — 83550 IRON BINDING TEST: CPT | Performed by: INTERNAL MEDICINE

## 2018-01-01 PROCEDURE — 25010000002 DIGOXIN PER 500 MCG: Performed by: PHYSICIAN ASSISTANT

## 2018-01-01 PROCEDURE — 99214 OFFICE O/P EST MOD 30 MIN: CPT | Performed by: INTERNAL MEDICINE

## 2018-01-01 PROCEDURE — 83050 HGB METHEMOGLOBIN QUAN: CPT

## 2018-01-01 PROCEDURE — 83540 ASSAY OF IRON: CPT | Performed by: INTERNAL MEDICINE

## 2018-01-01 PROCEDURE — 87102 FUNGUS ISOLATION CULTURE: CPT | Performed by: INTERNAL MEDICINE

## 2018-01-01 PROCEDURE — 82728 ASSAY OF FERRITIN: CPT

## 2018-01-01 PROCEDURE — 83036 HEMOGLOBIN GLYCOSYLATED A1C: CPT | Performed by: INTERNAL MEDICINE

## 2018-01-01 PROCEDURE — 25010000002 HYDROMORPHONE PER 4 MG: Performed by: NEUROLOGICAL SURGERY

## 2018-01-01 PROCEDURE — 96372 THER/PROPH/DIAG INJ SC/IM: CPT

## 2018-01-01 PROCEDURE — 74177 CT ABD & PELVIS W/CONTRAST: CPT

## 2018-01-01 PROCEDURE — G8997 SWALLOW GOAL STATUS: HCPCS | Performed by: SPEECH-LANGUAGE PATHOLOGIST

## 2018-01-01 PROCEDURE — 85025 COMPLETE CBC W/AUTO DIFF WBC: CPT | Performed by: INTERNAL MEDICINE

## 2018-01-01 PROCEDURE — 25810000003 SODIUM CHLORIDE 0.9 % WITH KCL 20 MEQ 20-0.9 MEQ/L-% SOLUTION: Performed by: NEUROLOGICAL SURGERY

## 2018-01-01 PROCEDURE — 84478 ASSAY OF TRIGLYCERIDES: CPT | Performed by: INTERNAL MEDICINE

## 2018-01-01 PROCEDURE — 80048 BASIC METABOLIC PNL TOTAL CA: CPT | Performed by: NURSE PRACTITIONER

## 2018-01-01 PROCEDURE — 93010 ELECTROCARDIOGRAM REPORT: CPT | Performed by: INTERNAL MEDICINE

## 2018-01-01 PROCEDURE — 83615 LACTATE (LD) (LDH) ENZYME: CPT | Performed by: NEUROLOGICAL SURGERY

## 2018-01-01 PROCEDURE — 25010000002 KETOROLAC TROMETHAMINE PER 15 MG: Performed by: INTERNAL MEDICINE

## 2018-01-01 PROCEDURE — 25010000002 DOCETAXEL 160 MG/8ML CONCENTRATION 8 ML VIAL: Performed by: INTERNAL MEDICINE

## 2018-01-01 PROCEDURE — 25010000002 PERFLUTREN 6.52 MG/ML SUSPENSION: Performed by: INTERNAL MEDICINE

## 2018-01-01 PROCEDURE — 25010000002 FUROSEMIDE PER 20 MG: Performed by: INTERNAL MEDICINE

## 2018-01-01 PROCEDURE — 82945 GLUCOSE OTHER FLUID: CPT | Performed by: INTERNAL MEDICINE

## 2018-01-01 PROCEDURE — 84443 ASSAY THYROID STIM HORMONE: CPT | Performed by: INTERNAL MEDICINE

## 2018-01-01 PROCEDURE — 84153 ASSAY OF PSA TOTAL: CPT

## 2018-01-01 PROCEDURE — 87015 SPECIMEN INFECT AGNT CONCNTJ: CPT | Performed by: INTERNAL MEDICINE

## 2018-01-01 PROCEDURE — 96368 THER/DIAG CONCURRENT INF: CPT

## 2018-01-01 PROCEDURE — 80048 BASIC METABOLIC PNL TOTAL CA: CPT | Performed by: INTERNAL MEDICINE

## 2018-01-01 PROCEDURE — 86850 RBC ANTIBODY SCREEN: CPT

## 2018-01-01 PROCEDURE — 25010000002 CALCIUM GLUCONATE PER 10 ML: Performed by: INTERNAL MEDICINE

## 2018-01-01 PROCEDURE — 25010000002 DIGOXIN PER 500 MCG: Performed by: NURSE PRACTITIONER

## 2018-01-01 PROCEDURE — 86901 BLOOD TYPING SEROLOGIC RH(D): CPT

## 2018-01-01 PROCEDURE — 76942 ECHO GUIDE FOR BIOPSY: CPT

## 2018-01-01 PROCEDURE — 25010000002 DIPHENHYDRAMINE PER 50 MG: Performed by: INTERNAL MEDICINE

## 2018-01-01 PROCEDURE — 83735 ASSAY OF MAGNESIUM: CPT | Performed by: PHYSICIAN ASSISTANT

## 2018-01-01 PROCEDURE — G8998 SWALLOW D/C STATUS: HCPCS | Performed by: SPEECH-LANGUAGE PATHOLOGIST

## 2018-01-01 PROCEDURE — 85007 BL SMEAR W/DIFF WBC COUNT: CPT | Performed by: NEUROLOGICAL SURGERY

## 2018-01-01 PROCEDURE — 92610 EVALUATE SWALLOWING FUNCTION: CPT

## 2018-01-01 PROCEDURE — 84311 SPECTROPHOTOMETRY: CPT | Performed by: INTERNAL MEDICINE

## 2018-01-01 PROCEDURE — P9016 RBC LEUKOCYTES REDUCED: HCPCS

## 2018-01-01 PROCEDURE — 88112 CYTOPATH CELL ENHANCE TECH: CPT | Performed by: INTERNAL MEDICINE

## 2018-01-01 PROCEDURE — 25010000002 ENOXAPARIN PER 10 MG: Performed by: INTERNAL MEDICINE

## 2018-01-01 PROCEDURE — 25010000002 LEVETIRACETAM IN NACL 0.82% 500 MG/100ML SOLUTION: Performed by: NEUROLOGICAL SURGERY

## 2018-01-01 PROCEDURE — 80162 ASSAY OF DIGOXIN TOTAL: CPT | Performed by: NEUROLOGICAL SURGERY

## 2018-01-01 PROCEDURE — 83550 IRON BINDING TEST: CPT

## 2018-01-01 PROCEDURE — 86920 COMPATIBILITY TEST SPIN: CPT

## 2018-01-01 PROCEDURE — 85007 BL SMEAR W/DIFF WBC COUNT: CPT | Performed by: NURSE PRACTITIONER

## 2018-01-01 PROCEDURE — 63710000001 INSULIN LISPRO (HUMAN) PER 5 UNITS: Performed by: NEUROLOGICAL SURGERY

## 2018-01-01 PROCEDURE — 36415 COLL VENOUS BLD VENIPUNCTURE: CPT | Performed by: INTERNAL MEDICINE

## 2018-01-01 PROCEDURE — 82042 OTHER SOURCE ALBUMIN QUAN EA: CPT | Performed by: INTERNAL MEDICINE

## 2018-01-01 PROCEDURE — 82607 VITAMIN B-12: CPT | Performed by: INTERNAL MEDICINE

## 2018-01-01 PROCEDURE — 83986 ASSAY PH BODY FLUID NOS: CPT | Performed by: INTERNAL MEDICINE

## 2018-01-01 PROCEDURE — G8996 SWALLOW CURRENT STATUS: HCPCS

## 2018-01-01 PROCEDURE — 87116 MYCOBACTERIA CULTURE: CPT | Performed by: INTERNAL MEDICINE

## 2018-01-01 PROCEDURE — 82805 BLOOD GASES W/O2 SATURATION: CPT

## 2018-01-01 PROCEDURE — 92610 EVALUATE SWALLOWING FUNCTION: CPT | Performed by: SPEECH-LANGUAGE PATHOLOGIST

## 2018-01-01 PROCEDURE — 84100 ASSAY OF PHOSPHORUS: CPT | Performed by: INTERNAL MEDICINE

## 2018-01-01 PROCEDURE — 78306 BONE IMAGING WHOLE BODY: CPT

## 2018-01-01 PROCEDURE — 25010000002 ONDANSETRON PER 1 MG: Performed by: INTERNAL MEDICINE

## 2018-01-01 PROCEDURE — 84402 ASSAY OF FREE TESTOSTERONE: CPT | Performed by: INTERNAL MEDICINE

## 2018-01-01 PROCEDURE — 25010000002 LORAZEPAM PER 2 MG: Performed by: NURSE PRACTITIONER

## 2018-01-01 PROCEDURE — G8997 SWALLOW GOAL STATUS: HCPCS

## 2018-01-01 PROCEDURE — 87070 CULTURE OTHR SPECIMN AEROBIC: CPT | Performed by: INTERNAL MEDICINE

## 2018-01-01 PROCEDURE — 71045 X-RAY EXAM CHEST 1 VIEW: CPT

## 2018-01-01 PROCEDURE — 25010000002 DENOSUMAB 120 MG/1.7ML SOLUTION: Performed by: INTERNAL MEDICINE

## 2018-01-01 PROCEDURE — 93306 TTE W/DOPPLER COMPLETE: CPT | Performed by: INTERNAL MEDICINE

## 2018-01-01 PROCEDURE — 0 TECHNETIUM OXIDRONATE KIT: Performed by: INTERNAL MEDICINE

## 2018-01-01 PROCEDURE — 82375 ASSAY CARBOXYHB QUANT: CPT

## 2018-01-01 PROCEDURE — 85007 BL SMEAR W/DIFF WBC COUNT: CPT | Performed by: INTERNAL MEDICINE

## 2018-01-01 PROCEDURE — 83615 LACTATE (LD) (LDH) ENZYME: CPT | Performed by: INTERNAL MEDICINE

## 2018-01-01 PROCEDURE — 82746 ASSAY OF FOLIC ACID SERUM: CPT | Performed by: INTERNAL MEDICINE

## 2018-01-01 PROCEDURE — 25010000002 DEXAMETHASONE PER 1 MG: Performed by: INTERNAL MEDICINE

## 2018-01-01 PROCEDURE — 0W9B3ZX DRAINAGE OF LEFT PLEURAL CAVITY, PERCUTANEOUS APPROACH, DIAGNOSTIC: ICD-10-PCS | Performed by: RADIOLOGY

## 2018-01-01 PROCEDURE — A9561 TC99M OXIDRONATE: HCPCS | Performed by: INTERNAL MEDICINE

## 2018-01-01 PROCEDURE — 99205 OFFICE O/P NEW HI 60 MIN: CPT | Performed by: INTERNAL MEDICINE

## 2018-01-01 PROCEDURE — 80048 BASIC METABOLIC PNL TOTAL CA: CPT | Performed by: PHYSICIAN ASSISTANT

## 2018-01-01 PROCEDURE — 63710000001 DIPHENHYDRAMINE PER 50 MG: Performed by: INTERNAL MEDICINE

## 2018-01-01 PROCEDURE — 63710000001 DRONABINOL PER 2.5 MG: Performed by: INTERNAL MEDICINE

## 2018-01-01 PROCEDURE — 83540 ASSAY OF IRON: CPT

## 2018-01-01 PROCEDURE — 36600 WITHDRAWAL OF ARTERIAL BLOOD: CPT

## 2018-01-01 PROCEDURE — 99238 HOSP IP/OBS DSCHRG MGMT 30/<: CPT | Performed by: INTERNAL MEDICINE

## 2018-01-01 PROCEDURE — 87075 CULTR BACTERIA EXCEPT BLOOD: CPT | Performed by: INTERNAL MEDICINE

## 2018-01-01 PROCEDURE — 80053 COMPREHEN METABOLIC PANEL: CPT | Performed by: INTERNAL MEDICINE

## 2018-01-01 PROCEDURE — 85027 COMPLETE CBC AUTOMATED: CPT | Performed by: PHYSICIAN ASSISTANT

## 2018-01-01 RX ORDER — ECHINACEA PURPUREA EXTRACT 125 MG
1 TABLET ORAL AS NEEDED
Status: DISCONTINUED | OUTPATIENT
Start: 2018-01-01 | End: 2018-01-01 | Stop reason: HOSPADM

## 2018-01-01 RX ORDER — METOPROLOL TARTRATE 50 MG/1
50 TABLET, FILM COATED ORAL EVERY 12 HOURS SCHEDULED
Status: DISCONTINUED | OUTPATIENT
Start: 2018-01-01 | End: 2018-01-01 | Stop reason: HOSPADM

## 2018-01-01 RX ORDER — FENTANYL 12 UG/H
1 PATCH TRANSDERMAL
Qty: 3 PATCH | Refills: 0 | Status: SHIPPED | OUTPATIENT
Start: 2018-01-01 | End: 2018-06-19

## 2018-01-01 RX ORDER — DEXAMETHASONE SODIUM PHOSPHATE 4 MG/ML
10 INJECTION, SOLUTION INTRA-ARTICULAR; INTRALESIONAL; INTRAMUSCULAR; INTRAVENOUS; SOFT TISSUE ONCE
Status: COMPLETED | OUTPATIENT
Start: 2018-01-01 | End: 2018-01-01

## 2018-01-01 RX ORDER — SODIUM CHLORIDE 9 MG/ML
250 INJECTION, SOLUTION INTRAVENOUS ONCE
Status: CANCELLED | OUTPATIENT
Start: 2018-01-01

## 2018-01-01 RX ORDER — FENTANYL 12 UG/H
1 PATCH TRANSDERMAL
Status: DISCONTINUED | OUTPATIENT
Start: 2018-01-01 | End: 2018-01-01 | Stop reason: HOSPADM

## 2018-01-01 RX ORDER — SODIUM CHLORIDE 9 MG/ML
250 INJECTION, SOLUTION INTRAVENOUS ONCE
Status: COMPLETED | OUTPATIENT
Start: 2018-01-01 | End: 2018-01-01

## 2018-01-01 RX ORDER — DIPHENHYDRAMINE HYDROCHLORIDE 50 MG/ML
50 INJECTION INTRAMUSCULAR; INTRAVENOUS AS NEEDED
Status: DISCONTINUED | OUTPATIENT
Start: 2018-01-01 | End: 2018-01-01 | Stop reason: HOSPADM

## 2018-01-01 RX ORDER — DIPHENHYDRAMINE HYDROCHLORIDE 50 MG/ML
50 INJECTION INTRAMUSCULAR; INTRAVENOUS AS NEEDED
Status: CANCELLED | OUTPATIENT
Start: 2018-01-01

## 2018-01-01 RX ORDER — FUROSEMIDE 10 MG/ML
40 INJECTION INTRAMUSCULAR; INTRAVENOUS ONCE
Status: CANCELLED
Start: 2018-01-01 | End: 2018-01-01

## 2018-01-01 RX ORDER — METFORMIN HYDROCHLORIDE 500 MG/1
500 TABLET, EXTENDED RELEASE ORAL DAILY
Status: DISCONTINUED | OUTPATIENT
Start: 2018-01-01 | End: 2018-01-01 | Stop reason: HOSPADM

## 2018-01-01 RX ORDER — ONDANSETRON 4 MG/1
4 TABLET, ORALLY DISINTEGRATING ORAL EVERY 6 HOURS PRN
Status: DISCONTINUED | OUTPATIENT
Start: 2018-01-01 | End: 2018-01-01 | Stop reason: HOSPADM

## 2018-01-01 RX ORDER — DIGOXIN 125 MCG
125 TABLET ORAL
Status: DISCONTINUED | OUTPATIENT
Start: 2018-01-01 | End: 2018-01-01 | Stop reason: HOSPADM

## 2018-01-01 RX ORDER — NICOTINE POLACRILEX 4 MG
15 LOZENGE BUCCAL
Status: DISCONTINUED | OUTPATIENT
Start: 2018-01-01 | End: 2018-01-01 | Stop reason: HOSPADM

## 2018-01-01 RX ORDER — MEPERIDINE HYDROCHLORIDE 50 MG/ML
25 INJECTION INTRAMUSCULAR; INTRAVENOUS; SUBCUTANEOUS
Status: CANCELLED | OUTPATIENT
Start: 2018-01-01

## 2018-01-01 RX ORDER — PREDNISONE 1 MG/1
5 TABLET ORAL 2 TIMES DAILY
Status: DISCONTINUED | OUTPATIENT
Start: 2018-01-01 | End: 2018-01-01 | Stop reason: HOSPADM

## 2018-01-01 RX ORDER — DIPHENHYDRAMINE HCL 25 MG
25 CAPSULE ORAL ONCE
Status: COMPLETED | OUTPATIENT
Start: 2018-01-01 | End: 2018-01-01

## 2018-01-01 RX ORDER — DIGOXIN 0.25 MG/ML
500 INJECTION INTRAMUSCULAR; INTRAVENOUS ONCE
Status: COMPLETED | OUTPATIENT
Start: 2018-01-01 | End: 2018-01-01

## 2018-01-01 RX ORDER — DRONABINOL 5 MG/1
5 CAPSULE ORAL
Qty: 60 CAPSULE | Refills: 0 | Status: SHIPPED | OUTPATIENT
Start: 2018-01-01 | End: 2018-01-01

## 2018-01-01 RX ORDER — SODIUM CHLORIDE 9 MG/ML
250 INJECTION, SOLUTION INTRAVENOUS ONCE
Status: DISCONTINUED | OUTPATIENT
Start: 2018-01-01 | End: 2018-01-01 | Stop reason: HOSPADM

## 2018-01-01 RX ORDER — SENNA AND DOCUSATE SODIUM 50; 8.6 MG/1; MG/1
2 TABLET, FILM COATED ORAL 2 TIMES DAILY
Status: DISCONTINUED | OUTPATIENT
Start: 2018-01-01 | End: 2018-01-01 | Stop reason: HOSPADM

## 2018-01-01 RX ORDER — SODIUM CHLORIDE 9 MG/ML
250 INJECTION, SOLUTION INTRAVENOUS AS NEEDED
Status: CANCELLED | OUTPATIENT
Start: 2018-01-01

## 2018-01-01 RX ORDER — FAMOTIDINE 10 MG/ML
20 INJECTION, SOLUTION INTRAVENOUS AS NEEDED
Status: CANCELLED | OUTPATIENT
Start: 2018-01-01

## 2018-01-01 RX ORDER — DIGOXIN 0.25 MG/ML
250 INJECTION INTRAMUSCULAR; INTRAVENOUS
Status: COMPLETED | OUTPATIENT
Start: 2018-01-01 | End: 2018-01-01

## 2018-01-01 RX ORDER — SODIUM CHLORIDE AND POTASSIUM CHLORIDE 150; 900 MG/100ML; MG/100ML
100 INJECTION, SOLUTION INTRAVENOUS CONTINUOUS
Status: DISCONTINUED | OUTPATIENT
Start: 2018-01-01 | End: 2018-01-01 | Stop reason: HOSPADM

## 2018-01-01 RX ORDER — FAMOTIDINE 10 MG/ML
20 INJECTION, SOLUTION INTRAVENOUS ONCE
Status: CANCELLED | OUTPATIENT
Start: 2018-01-01 | End: 2018-01-01

## 2018-01-01 RX ORDER — ACETAMINOPHEN 325 MG/1
650 TABLET ORAL ONCE
Status: COMPLETED | OUTPATIENT
Start: 2018-01-01 | End: 2018-01-01

## 2018-01-01 RX ORDER — FUROSEMIDE 10 MG/ML
20 INJECTION INTRAMUSCULAR; INTRAVENOUS ONCE
Status: CANCELLED
Start: 2018-01-01 | End: 2018-01-01

## 2018-01-01 RX ORDER — MEPERIDINE HYDROCHLORIDE 25 MG/ML
25 INJECTION INTRAMUSCULAR; INTRAVENOUS; SUBCUTANEOUS
Status: DISCONTINUED | OUTPATIENT
Start: 2018-01-01 | End: 2018-01-01 | Stop reason: HOSPADM

## 2018-01-01 RX ORDER — FAMOTIDINE 20 MG/1
40 TABLET, FILM COATED ORAL DAILY
Status: DISCONTINUED | OUTPATIENT
Start: 2018-01-01 | End: 2018-01-01 | Stop reason: HOSPADM

## 2018-01-01 RX ORDER — SODIUM CHLORIDE 0.9 % (FLUSH) 0.9 %
1-10 SYRINGE (ML) INJECTION AS NEEDED
Status: DISCONTINUED | OUTPATIENT
Start: 2018-01-01 | End: 2018-01-01 | Stop reason: HOSPADM

## 2018-01-01 RX ORDER — FUROSEMIDE 10 MG/ML
20 INJECTION INTRAMUSCULAR; INTRAVENOUS ONCE
Status: CANCELLED | OUTPATIENT
Start: 2018-01-01 | End: 2018-01-01

## 2018-01-01 RX ORDER — ROSUVASTATIN CALCIUM 20 MG/1
40 TABLET, COATED ORAL NIGHTLY
Status: DISCONTINUED | OUTPATIENT
Start: 2018-01-01 | End: 2018-01-01

## 2018-01-01 RX ORDER — PREDNISONE 1 MG/1
5 TABLET ORAL 2 TIMES DAILY WITH MEALS
COMMUNITY
End: 2018-01-01 | Stop reason: HOSPADM

## 2018-01-01 RX ORDER — FAMOTIDINE 10 MG/ML
20 INJECTION, SOLUTION INTRAVENOUS AS NEEDED
Status: DISCONTINUED | OUTPATIENT
Start: 2018-01-01 | End: 2018-01-01 | Stop reason: HOSPADM

## 2018-01-01 RX ORDER — GUAIFENESIN 600 MG/1
1200 TABLET, EXTENDED RELEASE ORAL 2 TIMES DAILY
Status: DISCONTINUED | OUTPATIENT
Start: 2018-01-01 | End: 2018-01-01 | Stop reason: HOSPADM

## 2018-01-01 RX ORDER — ONDANSETRON 2 MG/ML
16 INJECTION INTRAMUSCULAR; INTRAVENOUS ONCE
Status: CANCELLED
Start: 2018-01-01 | End: 2018-01-01

## 2018-01-01 RX ORDER — DEXAMETHASONE SODIUM PHOSPHATE 4 MG/ML
4 INJECTION, SOLUTION INTRA-ARTICULAR; INTRALESIONAL; INTRAMUSCULAR; INTRAVENOUS; SOFT TISSUE EVERY 6 HOURS
Status: DISCONTINUED | OUTPATIENT
Start: 2018-01-01 | End: 2018-01-01 | Stop reason: HOSPADM

## 2018-01-01 RX ORDER — ACETAMINOPHEN 325 MG/1
650 TABLET ORAL EVERY 4 HOURS PRN
Status: DISCONTINUED | OUTPATIENT
Start: 2018-01-01 | End: 2018-01-01 | Stop reason: HOSPADM

## 2018-01-01 RX ORDER — FUROSEMIDE 20 MG/1
20 TABLET ORAL DAILY
Status: DISCONTINUED | OUTPATIENT
Start: 2018-01-01 | End: 2018-01-01 | Stop reason: HOSPADM

## 2018-01-01 RX ORDER — VALSARTAN 40 MG/1
20 TABLET ORAL DAILY
Status: DISCONTINUED | OUTPATIENT
Start: 2018-01-01 | End: 2018-01-01 | Stop reason: HOSPADM

## 2018-01-01 RX ORDER — VALSARTAN 40 MG/1
40 TABLET ORAL DAILY
Status: DISCONTINUED | OUTPATIENT
Start: 2018-01-01 | End: 2018-01-01

## 2018-01-01 RX ORDER — DEXAMETHASONE SODIUM PHOSPHATE 4 MG/ML
2 INJECTION, SOLUTION INTRA-ARTICULAR; INTRALESIONAL; INTRAMUSCULAR; INTRAVENOUS; SOFT TISSUE EVERY 12 HOURS
Status: DISCONTINUED | OUTPATIENT
Start: 2018-06-15 | End: 2018-01-01 | Stop reason: HOSPADM

## 2018-01-01 RX ORDER — ASPIRIN 81 MG/1
81 TABLET ORAL NIGHTLY
Status: DISCONTINUED | OUTPATIENT
Start: 2018-01-01 | End: 2018-01-01

## 2018-01-01 RX ORDER — FAMOTIDINE 10 MG/ML
20 INJECTION, SOLUTION INTRAVENOUS ONCE
Status: COMPLETED | OUTPATIENT
Start: 2018-01-01 | End: 2018-01-01

## 2018-01-01 RX ORDER — FUROSEMIDE 10 MG/ML
40 INJECTION INTRAMUSCULAR; INTRAVENOUS ONCE
Status: COMPLETED | OUTPATIENT
Start: 2018-01-01 | End: 2018-01-01

## 2018-01-01 RX ORDER — LORAZEPAM 2 MG/ML
0.25 INJECTION INTRAMUSCULAR EVERY 6 HOURS PRN
Status: DISCONTINUED | OUTPATIENT
Start: 2018-01-01 | End: 2018-01-01 | Stop reason: HOSPADM

## 2018-01-01 RX ORDER — DEXTROSE MONOHYDRATE 25 G/50ML
25 INJECTION, SOLUTION INTRAVENOUS
Status: DISCONTINUED | OUTPATIENT
Start: 2018-01-01 | End: 2018-01-01 | Stop reason: HOSPADM

## 2018-01-01 RX ORDER — METFORMIN HYDROCHLORIDE 500 MG/1
500 TABLET, EXTENDED RELEASE ORAL
COMMUNITY

## 2018-01-01 RX ORDER — CELECOXIB 100 MG/1
100 CAPSULE ORAL 2 TIMES DAILY PRN
Status: DISCONTINUED | OUTPATIENT
Start: 2018-01-01 | End: 2018-01-01 | Stop reason: HOSPADM

## 2018-01-01 RX ORDER — MONTELUKAST SODIUM 10 MG/1
10 TABLET ORAL NIGHTLY
COMMUNITY
End: 2018-01-01 | Stop reason: HOSPADM

## 2018-01-01 RX ORDER — PREDNISONE 1 MG/1
5 TABLET ORAL 2 TIMES DAILY WITH MEALS
Status: DISCONTINUED | OUTPATIENT
Start: 2018-01-01 | End: 2018-01-01 | Stop reason: ALTCHOICE

## 2018-01-01 RX ORDER — DIGOXIN 125 MCG
125 TABLET ORAL
Status: DISCONTINUED | OUTPATIENT
Start: 2018-01-01 | End: 2018-01-01

## 2018-01-01 RX ORDER — FUROSEMIDE 10 MG/ML
20 INJECTION INTRAMUSCULAR; INTRAVENOUS ONCE
Status: COMPLETED | OUTPATIENT
Start: 2018-01-01 | End: 2018-01-01

## 2018-01-01 RX ORDER — SODIUM CHLORIDE 9 MG/ML
250 INJECTION, SOLUTION INTRAVENOUS AS NEEDED
Status: DISCONTINUED | OUTPATIENT
Start: 2018-01-01 | End: 2018-01-01 | Stop reason: HOSPADM

## 2018-01-01 RX ORDER — ONDANSETRON 4 MG/1
4 TABLET, FILM COATED ORAL EVERY 6 HOURS PRN
Status: DISCONTINUED | OUTPATIENT
Start: 2018-01-01 | End: 2018-01-01 | Stop reason: HOSPADM

## 2018-01-01 RX ORDER — VALSARTAN 40 MG/1
20 TABLET ORAL
Status: DISCONTINUED | OUTPATIENT
Start: 2018-01-01 | End: 2018-01-01 | Stop reason: HOSPADM

## 2018-01-01 RX ORDER — HYDROMORPHONE HYDROCHLORIDE 1 MG/ML
0.5 INJECTION, SOLUTION INTRAMUSCULAR; INTRAVENOUS; SUBCUTANEOUS
Status: DISCONTINUED | OUTPATIENT
Start: 2018-01-01 | End: 2018-01-01 | Stop reason: HOSPADM

## 2018-01-01 RX ORDER — DEXAMETHASONE SODIUM PHOSPHATE 4 MG/ML
2 INJECTION, SOLUTION INTRA-ARTICULAR; INTRALESIONAL; INTRAMUSCULAR; INTRAVENOUS; SOFT TISSUE EVERY 6 HOURS
Status: DISCONTINUED | OUTPATIENT
Start: 2018-01-01 | End: 2018-01-01 | Stop reason: HOSPADM

## 2018-01-01 RX ORDER — SODIUM CHLORIDE 9 MG/ML
250 INJECTION, SOLUTION INTRAVENOUS ONCE
OUTPATIENT
Start: 2018-01-01

## 2018-01-01 RX ORDER — METFORMIN HYDROCHLORIDE 500 MG/1
500 TABLET, EXTENDED RELEASE ORAL
Status: DISCONTINUED | OUTPATIENT
Start: 2018-01-01 | End: 2018-01-01 | Stop reason: HOSPADM

## 2018-01-01 RX ORDER — ONDANSETRON 2 MG/ML
4 INJECTION INTRAMUSCULAR; INTRAVENOUS EVERY 6 HOURS PRN
Status: DISCONTINUED | OUTPATIENT
Start: 2018-01-01 | End: 2018-01-01 | Stop reason: HOSPADM

## 2018-01-01 RX ORDER — POTASSIUM CHLORIDE 750 MG/1
10 TABLET, FILM COATED, EXTENDED RELEASE ORAL
COMMUNITY

## 2018-01-01 RX ORDER — SENNA AND DOCUSATE SODIUM 50; 8.6 MG/1; MG/1
2 TABLET, FILM COATED ORAL 2 TIMES DAILY
COMMUNITY

## 2018-01-01 RX ORDER — LEVETIRACETAM 5 MG/ML
500 INJECTION INTRAVASCULAR EVERY 12 HOURS SCHEDULED
Status: DISCONTINUED | OUTPATIENT
Start: 2018-01-01 | End: 2018-01-01 | Stop reason: HOSPADM

## 2018-01-01 RX ORDER — ACETAMINOPHEN 325 MG/1
325 TABLET ORAL ONCE
Status: CANCELLED | OUTPATIENT
Start: 2018-01-01 | End: 2018-01-01

## 2018-01-01 RX ORDER — METOPROLOL TARTRATE 50 MG/1
50 TABLET, FILM COATED ORAL EVERY 12 HOURS SCHEDULED
Qty: 180 TABLET | Refills: 3 | Status: SHIPPED | OUTPATIENT
Start: 2018-01-01

## 2018-01-01 RX ORDER — DIPHENHYDRAMINE HCL 25 MG
25 CAPSULE ORAL ONCE
Status: CANCELLED | OUTPATIENT
Start: 2018-01-01 | End: 2018-01-01

## 2018-01-01 RX ORDER — HYDROMORPHONE HYDROCHLORIDE 2 MG/1
2 TABLET ORAL
Status: DISCONTINUED | OUTPATIENT
Start: 2018-01-01 | End: 2018-01-01

## 2018-01-01 RX ORDER — POTASSIUM CHLORIDE 750 MG/1
10 CAPSULE, EXTENDED RELEASE ORAL
Status: DISCONTINUED | OUTPATIENT
Start: 2018-01-01 | End: 2018-01-01 | Stop reason: HOSPADM

## 2018-01-01 RX ORDER — HYDROMORPHONE HYDROCHLORIDE 2 MG/1
2 TABLET ORAL
Status: DISCONTINUED | OUTPATIENT
Start: 2018-01-01 | End: 2018-01-01 | Stop reason: HOSPADM

## 2018-01-01 RX ORDER — FUROSEMIDE 20 MG/1
20 TABLET ORAL DAILY
COMMUNITY

## 2018-01-01 RX ORDER — DRONABINOL 2.5 MG/1
5 CAPSULE ORAL
Status: DISCONTINUED | OUTPATIENT
Start: 2018-01-01 | End: 2018-01-01 | Stop reason: HOSPADM

## 2018-01-01 RX ORDER — MONTELUKAST SODIUM 10 MG/1
10 TABLET ORAL NIGHTLY
Status: DISCONTINUED | OUTPATIENT
Start: 2018-01-01 | End: 2018-01-01

## 2018-01-01 RX ORDER — PREDNISONE 10 MG/1
TABLET ORAL
Qty: 1 EACH | Refills: 0 | Status: SHIPPED | OUTPATIENT
Start: 2018-01-01

## 2018-01-01 RX ORDER — KETOROLAC TROMETHAMINE 30 MG/ML
15 INJECTION, SOLUTION INTRAMUSCULAR; INTRAVENOUS ONCE AS NEEDED
Status: COMPLETED | OUTPATIENT
Start: 2018-01-01 | End: 2018-01-01

## 2018-01-01 RX ORDER — ASPIRIN 81 MG/1
81 TABLET ORAL DAILY
Status: DISCONTINUED | OUTPATIENT
Start: 2018-01-01 | End: 2018-01-01

## 2018-01-01 RX ORDER — DEXTROMETHORPHAN POLISTIREX 30 MG/5ML
60 SUSPENSION ORAL EVERY 12 HOURS PRN
Status: DISCONTINUED | OUTPATIENT
Start: 2018-01-01 | End: 2018-01-01 | Stop reason: HOSPADM

## 2018-01-01 RX ORDER — CELECOXIB 100 MG/1
100 CAPSULE ORAL 2 TIMES DAILY PRN
COMMUNITY
End: 2018-01-01

## 2018-01-01 RX ORDER — METFORMIN HYDROCHLORIDE 500 MG/1
500 TABLET, EXTENDED RELEASE ORAL 2 TIMES DAILY
Status: DISCONTINUED | OUTPATIENT
Start: 2018-01-01 | End: 2018-01-01

## 2018-01-01 RX ORDER — VALSARTAN 40 MG/1
20 TABLET ORAL
COMMUNITY

## 2018-01-01 RX ORDER — DIGOXIN 125 MCG
125 TABLET ORAL
Qty: 90 TABLET | Refills: 3 | Status: SHIPPED | OUTPATIENT
Start: 2018-01-01

## 2018-01-01 RX ORDER — BUDESONIDE AND FORMOTEROL FUMARATE DIHYDRATE 160; 4.5 UG/1; UG/1
2 AEROSOL RESPIRATORY (INHALATION)
Status: DISCONTINUED | OUTPATIENT
Start: 2018-01-01 | End: 2018-01-01 | Stop reason: HOSPADM

## 2018-01-01 RX ORDER — SENNA AND DOCUSATE SODIUM 50; 8.6 MG/1; MG/1
1 TABLET, FILM COATED ORAL 4 TIMES DAILY
Status: DISCONTINUED | OUTPATIENT
Start: 2018-01-01 | End: 2018-01-01

## 2018-01-01 RX ORDER — ACETAMINOPHEN 325 MG/1
325 TABLET ORAL ONCE
Status: COMPLETED | OUTPATIENT
Start: 2018-01-01 | End: 2018-01-01

## 2018-01-01 RX ADMIN — PREDNISONE 5 MG: 5 TABLET ORAL at 09:07

## 2018-01-01 RX ADMIN — POTASSIUM CHLORIDE AND SODIUM CHLORIDE 100 ML/HR: 900; 150 INJECTION, SOLUTION INTRAVENOUS at 21:07

## 2018-01-01 RX ADMIN — GUAIFENESIN 1200 MG: 600 TABLET, EXTENDED RELEASE ORAL at 17:44

## 2018-01-01 RX ADMIN — DEXAMETHASONE SODIUM PHOSPHATE: 4 INJECTION, SOLUTION INTRAMUSCULAR; INTRAVENOUS at 10:17

## 2018-01-01 RX ADMIN — BUDESONIDE AND FORMOTEROL FUMARATE DIHYDRATE 2 PUFF: 160; 4.5 AEROSOL RESPIRATORY (INHALATION) at 06:56

## 2018-01-01 RX ADMIN — ACETAMINOPHEN 650 MG: 325 TABLET, FILM COATED ORAL at 11:35

## 2018-01-01 RX ADMIN — METFORMIN HYDROCHLORIDE 500 MG: 500 TABLET, EXTENDED RELEASE ORAL at 11:11

## 2018-01-01 RX ADMIN — CALCIUM GLUCONATE 2 G: 98 INJECTION, SOLUTION INTRAVENOUS at 10:54

## 2018-01-01 RX ADMIN — METOPROLOL TARTRATE 50 MG: 50 TABLET ORAL at 09:44

## 2018-01-01 RX ADMIN — LEVETIRACETAM 500 MG: 5 INJECTION INTRAVENOUS at 20:57

## 2018-01-01 RX ADMIN — FUROSEMIDE 20 MG: 20 TABLET ORAL at 13:41

## 2018-01-01 RX ADMIN — BUDESONIDE AND FORMOTEROL FUMARATE DIHYDRATE 2 PUFF: 160; 4.5 AEROSOL RESPIRATORY (INHALATION) at 20:37

## 2018-01-01 RX ADMIN — METFORMIN HYDROCHLORIDE 500 MG: 500 TABLET, EXTENDED RELEASE ORAL at 09:45

## 2018-01-01 RX ADMIN — DILTIAZEM HYDROCHLORIDE 10 MG/HR: 5 INJECTION INTRAVENOUS at 11:43

## 2018-01-01 RX ADMIN — BUDESONIDE AND FORMOTEROL FUMARATE DIHYDRATE 2 PUFF: 160; 4.5 AEROSOL RESPIRATORY (INHALATION) at 19:58

## 2018-01-01 RX ADMIN — DIGOXIN 125 MCG: 0.12 TABLET ORAL at 11:10

## 2018-01-01 RX ADMIN — ACETAMINOPHEN 650 MG: 325 TABLET, FILM COATED ORAL at 20:08

## 2018-01-01 RX ADMIN — KETOROLAC TROMETHAMINE 15 MG: 30 INJECTION, SOLUTION INTRAMUSCULAR at 21:52

## 2018-01-01 RX ADMIN — DIPHENHYDRAMINE HYDROCHLORIDE 25 MG: 50 INJECTION, SOLUTION INTRAMUSCULAR; INTRAVENOUS at 09:54

## 2018-01-01 RX ADMIN — METOPROLOL TARTRATE 12.5 MG: 25 TABLET, FILM COATED ORAL at 21:00

## 2018-01-01 RX ADMIN — FUROSEMIDE 20 MG: 20 TABLET ORAL at 08:27

## 2018-01-01 RX ADMIN — DILTIAZEM HYDROCHLORIDE 10 MG/HR: 5 INJECTION INTRAVENOUS at 05:46

## 2018-01-01 RX ADMIN — DILTIAZEM HYDROCHLORIDE 10 MG/HR: 5 INJECTION INTRAVENOUS at 08:14

## 2018-01-01 RX ADMIN — ESTROGENS, CONJUGATED 0.62 MG: 0.62 TABLET, FILM COATED ORAL at 20:17

## 2018-01-01 RX ADMIN — FUROSEMIDE 20 MG: 20 TABLET ORAL at 09:45

## 2018-01-01 RX ADMIN — APIXABAN 5 MG: 5 TABLET, FILM COATED ORAL at 20:17

## 2018-01-01 RX ADMIN — SODIUM CHLORIDE 250 ML: 9 INJECTION, SOLUTION INTRAVENOUS at 10:53

## 2018-01-01 RX ADMIN — ACETAMINOPHEN 650 MG: 325 TABLET ORAL at 08:36

## 2018-01-01 RX ADMIN — DOCUSATE SODIUM AND SENNOSIDES 2 TABLET: 8.6; 5 TABLET, FILM COATED ORAL at 20:48

## 2018-01-01 RX ADMIN — DEXAMETHASONE SODIUM PHOSPHATE 10 MG: 4 INJECTION, SOLUTION INTRA-ARTICULAR; INTRALESIONAL; INTRAMUSCULAR; INTRAVENOUS; SOFT TISSUE at 23:22

## 2018-01-01 RX ADMIN — HYDROMORPHONE HYDROCHLORIDE 0.5 MG: 1 INJECTION, SOLUTION INTRAMUSCULAR; INTRAVENOUS; SUBCUTANEOUS at 01:27

## 2018-01-01 RX ADMIN — DOCUSATE SODIUM AND SENNOSIDES 1 TABLET: 8.6; 5 TABLET, FILM COATED ORAL at 22:58

## 2018-01-01 RX ADMIN — DILTIAZEM HYDROCHLORIDE 5 MG/HR: 5 INJECTION INTRAVENOUS at 18:17

## 2018-01-01 RX ADMIN — MONTELUKAST SODIUM 10 MG: 10 TABLET, FILM COATED ORAL at 00:15

## 2018-01-01 RX ADMIN — VALSARTAN 20 MG: 40 TABLET ORAL at 09:35

## 2018-01-01 RX ADMIN — METFORMIN HYDROCHLORIDE 500 MG: 500 TABLET, EXTENDED RELEASE ORAL at 08:27

## 2018-01-01 RX ADMIN — VALSARTAN 20 MG: 40 TABLET ORAL at 09:44

## 2018-01-01 RX ADMIN — APIXABAN 5 MG: 5 TABLET, FILM COATED ORAL at 08:08

## 2018-01-01 RX ADMIN — DIGOXIN 500 MCG: 250 INJECTION, SOLUTION INTRAMUSCULAR; INTRAVENOUS; PARENTERAL at 11:17

## 2018-01-01 RX ADMIN — DOCUSATE SODIUM AND SENNOSIDES 2 TABLET: 8.6; 5 TABLET, FILM COATED ORAL at 21:00

## 2018-01-01 RX ADMIN — LEVETIRACETAM 500 MG: 5 INJECTION INTRAVENOUS at 08:59

## 2018-01-01 RX ADMIN — DOCUSATE SODIUM AND SENNOSIDES 2 TABLET: 8.6; 5 TABLET, FILM COATED ORAL at 20:33

## 2018-01-01 RX ADMIN — ENOXAPARIN SODIUM 60 MG: 60 INJECTION SUBCUTANEOUS at 12:45

## 2018-01-01 RX ADMIN — DOCUSATE SODIUM AND SENNOSIDES 2 TABLET: 8.6; 5 TABLET, FILM COATED ORAL at 21:14

## 2018-01-01 RX ADMIN — IRON SUCROSE 200 MG: 20 INJECTION, SOLUTION INTRAVENOUS at 10:38

## 2018-01-01 RX ADMIN — BUDESONIDE AND FORMOTEROL FUMARATE DIHYDRATE 2 PUFF: 160; 4.5 AEROSOL RESPIRATORY (INHALATION) at 19:33

## 2018-01-01 RX ADMIN — HYDROMORPHONE HYDROCHLORIDE 0.5 MG: 1 INJECTION, SOLUTION INTRAMUSCULAR; INTRAVENOUS; SUBCUTANEOUS at 06:50

## 2018-01-01 RX ADMIN — ESTROGENS, CONJUGATED 0.62 MG: 0.62 TABLET, FILM COATED ORAL at 21:00

## 2018-01-01 RX ADMIN — ACETAMINOPHEN 325 MG: 325 TABLET, FILM COATED ORAL at 13:23

## 2018-01-01 RX ADMIN — DIPHENHYDRAMINE HYDROCHLORIDE 25 MG: 50 INJECTION, SOLUTION INTRAMUSCULAR; INTRAVENOUS at 11:36

## 2018-01-01 RX ADMIN — SODIUM CHLORIDE 250 ML: 9 INJECTION, SOLUTION INTRAVENOUS at 10:21

## 2018-01-01 RX ADMIN — DILTIAZEM HYDROCHLORIDE 5 MG/HR: 5 INJECTION INTRAVENOUS at 01:33

## 2018-01-01 RX ADMIN — ESTROGENS, CONJUGATED 0.62 MG: 0.62 TABLET, FILM COATED ORAL at 00:15

## 2018-01-01 RX ADMIN — MONTELUKAST SODIUM 10 MG: 10 TABLET, FILM COATED ORAL at 21:00

## 2018-01-01 RX ADMIN — BUDESONIDE AND FORMOTEROL FUMARATE DIHYDRATE 2 PUFF: 160; 4.5 AEROSOL RESPIRATORY (INHALATION) at 07:24

## 2018-01-01 RX ADMIN — METOPROLOL TARTRATE 12.5 MG: 25 TABLET, FILM COATED ORAL at 12:17

## 2018-01-01 RX ADMIN — DIGOXIN 250 MCG: 250 INJECTION, SOLUTION INTRAMUSCULAR; INTRAVENOUS; PARENTERAL at 14:02

## 2018-01-01 RX ADMIN — ASPIRIN 81 MG: 81 TABLET ORAL at 00:15

## 2018-01-01 RX ADMIN — METOPROLOL TARTRATE 12.5 MG: 25 TABLET, FILM COATED ORAL at 10:45

## 2018-01-01 RX ADMIN — HYDROCORTISONE SODIUM SUCCINATE 100 MG: 100 INJECTION, POWDER, FOR SOLUTION INTRAMUSCULAR; INTRAVENOUS at 13:24

## 2018-01-01 RX ADMIN — MONTELUKAST SODIUM 10 MG: 10 TABLET, FILM COATED ORAL at 20:55

## 2018-01-01 RX ADMIN — METOPROLOL TARTRATE 50 MG: 50 TABLET ORAL at 20:22

## 2018-01-01 RX ADMIN — HYDROMORPHONE HYDROCHLORIDE 2 MG: 2 TABLET ORAL at 13:39

## 2018-01-01 RX ADMIN — ACETAMINOPHEN 650 MG: 325 TABLET, FILM COATED ORAL at 10:49

## 2018-01-01 RX ADMIN — METFORMIN HYDROCHLORIDE 500 MG: 500 TABLET, EXTENDED RELEASE ORAL at 08:08

## 2018-01-01 RX ADMIN — DEXAMETHASONE SODIUM PHOSPHATE 4 MG: 4 INJECTION, SOLUTION INTRA-ARTICULAR; INTRALESIONAL; INTRAMUSCULAR; INTRAVENOUS; SOFT TISSUE at 04:58

## 2018-01-01 RX ADMIN — FAMOTIDINE 40 MG: 20 TABLET, FILM COATED ORAL at 11:11

## 2018-01-01 RX ADMIN — FUROSEMIDE 20 MG: 20 TABLET ORAL at 11:12

## 2018-01-01 RX ADMIN — METFORMIN HYDROCHLORIDE 500 MG: 500 TABLET, EXTENDED RELEASE ORAL at 08:17

## 2018-01-01 RX ADMIN — DOCUSATE SODIUM AND SENNOSIDES 2 TABLET: 8.6; 5 TABLET, FILM COATED ORAL at 08:31

## 2018-01-01 RX ADMIN — DIGOXIN 250 MCG: 250 INJECTION, SOLUTION INTRAMUSCULAR; INTRAVENOUS; PARENTERAL at 12:05

## 2018-01-01 RX ADMIN — APIXABAN 5 MG: 5 TABLET, FILM COATED ORAL at 20:55

## 2018-01-01 RX ADMIN — METFORMIN HYDROCHLORIDE 500 MG: 500 TABLET, EXTENDED RELEASE ORAL at 09:24

## 2018-01-01 RX ADMIN — DOCUSATE SODIUM AND SENNOSIDES 2 TABLET: 8.6; 5 TABLET, FILM COATED ORAL at 09:45

## 2018-01-01 RX ADMIN — MONTELUKAST SODIUM 10 MG: 10 TABLET, FILM COATED ORAL at 20:22

## 2018-01-01 RX ADMIN — IRON SUCROSE 200 MG: 20 INJECTION, SOLUTION INTRAVENOUS at 14:14

## 2018-01-01 RX ADMIN — BUDESONIDE AND FORMOTEROL FUMARATE DIHYDRATE 2 PUFF: 160; 4.5 AEROSOL RESPIRATORY (INHALATION) at 20:28

## 2018-01-01 RX ADMIN — BUDESONIDE AND FORMOTEROL FUMARATE DIHYDRATE 2 PUFF: 160; 4.5 AEROSOL RESPIRATORY (INHALATION) at 08:24

## 2018-01-01 RX ADMIN — GUAIFENESIN 1200 MG: 600 TABLET, EXTENDED RELEASE ORAL at 20:22

## 2018-01-01 RX ADMIN — PREDNISONE 5 MG: 5 TABLET ORAL at 09:25

## 2018-01-01 RX ADMIN — DOCUSATE SODIUM AND SENNOSIDES 2 TABLET: 8.6; 5 TABLET, FILM COATED ORAL at 09:07

## 2018-01-01 RX ADMIN — DEXTROMETHORPHAN POLISTIREX 60 MG: 30 SUSPENSION ORAL at 12:18

## 2018-01-01 RX ADMIN — ACETAMINOPHEN 650 MG: 325 TABLET, FILM COATED ORAL at 00:51

## 2018-01-01 RX ADMIN — GUAIFENESIN 1200 MG: 600 TABLET, EXTENDED RELEASE ORAL at 08:16

## 2018-01-01 RX ADMIN — PERFLUTREN 8.48 MG: 6.52 INJECTION, SUSPENSION INTRAVENOUS at 16:13

## 2018-01-01 RX ADMIN — PREDNISONE 5 MG: 5 TABLET ORAL at 20:22

## 2018-01-01 RX ADMIN — DOCUSATE SODIUM AND SENNOSIDES 2 TABLET: 8.6; 5 TABLET, FILM COATED ORAL at 08:27

## 2018-01-01 RX ADMIN — DOCETAXEL 139 MG: 20 INJECTION, SOLUTION, CONCENTRATE INTRAVENOUS at 13:02

## 2018-01-01 RX ADMIN — DOCUSATE SODIUM AND SENNOSIDES 2 TABLET: 8.6; 5 TABLET, FILM COATED ORAL at 20:55

## 2018-01-01 RX ADMIN — METOPROLOL TARTRATE 12.5 MG: 25 TABLET, FILM COATED ORAL at 20:56

## 2018-01-01 RX ADMIN — APIXABAN 5 MG: 5 TABLET, FILM COATED ORAL at 09:44

## 2018-01-01 RX ADMIN — APIXABAN 5 MG: 5 TABLET, FILM COATED ORAL at 09:24

## 2018-01-01 RX ADMIN — APIXABAN 5 MG: 5 TABLET, FILM COATED ORAL at 20:22

## 2018-01-01 RX ADMIN — GUAIFENESIN 1200 MG: 600 TABLET, EXTENDED RELEASE ORAL at 20:16

## 2018-01-01 RX ADMIN — ACETAMINOPHEN 650 MG: 325 TABLET, FILM COATED ORAL at 01:23

## 2018-01-01 RX ADMIN — SODIUM CHLORIDE 250 ML: 9 INJECTION, SOLUTION INTRAVENOUS at 10:24

## 2018-01-01 RX ADMIN — METOPROLOL TARTRATE 25 MG: 25 TABLET, FILM COATED ORAL at 08:27

## 2018-01-01 RX ADMIN — LORAZEPAM 0.25 MG: 2 INJECTION INTRAMUSCULAR; INTRAVENOUS at 23:50

## 2018-01-01 RX ADMIN — SODIUM CHLORIDE 250 ML: 9 INJECTION, SOLUTION INTRAVENOUS at 11:14

## 2018-01-01 RX ADMIN — VALSARTAN 20 MG: 40 TABLET ORAL at 11:06

## 2018-01-01 RX ADMIN — VALSARTAN 40 MG: 40 TABLET ORAL at 08:17

## 2018-01-01 RX ADMIN — BUDESONIDE AND FORMOTEROL FUMARATE DIHYDRATE 2 PUFF: 160; 4.5 AEROSOL RESPIRATORY (INHALATION) at 08:00

## 2018-01-01 RX ADMIN — METOPROLOL TARTRATE 12.5 MG: 25 TABLET, FILM COATED ORAL at 20:33

## 2018-01-01 RX ADMIN — INSULIN LISPRO 2 UNITS: 100 INJECTION, SOLUTION INTRAVENOUS; SUBCUTANEOUS at 16:51

## 2018-01-01 RX ADMIN — IPRATROPIUM BROMIDE 0.5 MG: 0.5 SOLUTION RESPIRATORY (INHALATION) at 20:27

## 2018-01-01 RX ADMIN — DENOSUMAB 120 MG: 120 INJECTION SUBCUTANEOUS at 14:26

## 2018-01-01 RX ADMIN — FUROSEMIDE 20 MG: 10 INJECTION, SOLUTION INTRAVENOUS at 12:44

## 2018-01-01 RX ADMIN — ASPIRIN 81 MG: 81 TABLET ORAL at 20:56

## 2018-01-01 RX ADMIN — POTASSIUM CHLORIDE AND SODIUM CHLORIDE 100 ML/HR: 900; 150 INJECTION, SOLUTION INTRAVENOUS at 10:29

## 2018-01-01 RX ADMIN — DILTIAZEM HYDROCHLORIDE 15 MG/HR: 5 INJECTION INTRAVENOUS at 20:12

## 2018-01-01 RX ADMIN — ENOXAPARIN SODIUM 40 MG: 40 INJECTION SUBCUTANEOUS at 23:04

## 2018-01-01 RX ADMIN — FUROSEMIDE 20 MG: 20 TABLET ORAL at 09:34

## 2018-01-01 RX ADMIN — POTASSIUM CHLORIDE 10 MEQ: 750 CAPSULE, EXTENDED RELEASE ORAL at 11:06

## 2018-01-01 RX ADMIN — GUAIFENESIN 1200 MG: 600 TABLET, EXTENDED RELEASE ORAL at 09:44

## 2018-01-01 RX ADMIN — GUAIFENESIN 1200 MG: 600 TABLET, EXTENDED RELEASE ORAL at 09:24

## 2018-01-01 RX ADMIN — PREDNISONE 5 MG: 5 TABLET ORAL at 20:17

## 2018-01-01 RX ADMIN — DOCUSATE SODIUM AND SENNOSIDES 2 TABLET: 8.6; 5 TABLET, FILM COATED ORAL at 08:16

## 2018-01-01 RX ADMIN — ACETAMINOPHEN 650 MG: 325 TABLET, FILM COATED ORAL at 08:08

## 2018-01-01 RX ADMIN — ESTROGENS, CONJUGATED 0.62 MG: 0.62 TABLET, FILM COATED ORAL at 20:22

## 2018-01-01 RX ADMIN — HYDROMORPHONE HYDROCHLORIDE 2 MG: 2 TABLET ORAL at 10:50

## 2018-01-01 RX ADMIN — DILTIAZEM HYDROCHLORIDE 5 MG/HR: 5 INJECTION INTRAVENOUS at 00:35

## 2018-01-01 RX ADMIN — ONDANSETRON HYDROCHLORIDE 16 MG: 2 SOLUTION INTRAMUSCULAR; INTRAVENOUS at 12:14

## 2018-01-01 RX ADMIN — DENOSUMAB 120 MG: 120 INJECTION SUBCUTANEOUS at 12:17

## 2018-01-01 RX ADMIN — PREDNISONE 5 MG: 5 TABLET ORAL at 17:44

## 2018-01-01 RX ADMIN — HYDROMORPHONE HYDROCHLORIDE 2 MG: 2 TABLET ORAL at 18:53

## 2018-01-01 RX ADMIN — DOCUSATE SODIUM AND SENNOSIDES 2 TABLET: 8.6; 5 TABLET, FILM COATED ORAL at 20:22

## 2018-01-01 RX ADMIN — BUDESONIDE AND FORMOTEROL FUMARATE DIHYDRATE 2 PUFF: 160; 4.5 AEROSOL RESPIRATORY (INHALATION) at 06:30

## 2018-01-01 RX ADMIN — IOPAMIDOL 100 ML: 612 INJECTION, SOLUTION INTRAVENOUS at 11:45

## 2018-01-01 RX ADMIN — SODIUM CHLORIDE 250 ML: 9 INJECTION, SOLUTION INTRAVENOUS at 10:30

## 2018-01-01 RX ADMIN — DEXAMETHASONE SODIUM PHOSPHATE 4 MG: 4 INJECTION, SOLUTION INTRA-ARTICULAR; INTRALESIONAL; INTRAMUSCULAR; INTRAVENOUS; SOFT TISSUE at 16:46

## 2018-01-01 RX ADMIN — DOCUSATE SODIUM AND SENNOSIDES 2 TABLET: 8.6; 5 TABLET, FILM COATED ORAL at 20:17

## 2018-01-01 RX ADMIN — DILTIAZEM HYDROCHLORIDE 10 MG/HR: 5 INJECTION INTRAVENOUS at 18:57

## 2018-01-01 RX ADMIN — METFORMIN HYDROCHLORIDE 500 MG: 500 TABLET, EXTENDED RELEASE ORAL at 09:07

## 2018-01-01 RX ADMIN — IRON SUCROSE 200 MG: 20 INJECTION, SOLUTION INTRAVENOUS at 11:16

## 2018-01-01 RX ADMIN — SODIUM CHLORIDE 250 ML: 9 INJECTION, SOLUTION INTRAVENOUS at 12:10

## 2018-01-01 RX ADMIN — SODIUM CHLORIDE 250 ML: 9 INJECTION, SOLUTION INTRAVENOUS at 11:10

## 2018-01-01 RX ADMIN — SODIUM CHLORIDE 250 ML: 9 INJECTION, SOLUTION INTRAVENOUS at 09:30

## 2018-01-01 RX ADMIN — METFORMIN HYDROCHLORIDE 500 MG: 500 TABLET, EXTENDED RELEASE ORAL at 09:34

## 2018-01-01 RX ADMIN — Medication 1 TABLET: at 11:11

## 2018-01-01 RX ADMIN — FAMOTIDINE 20 MG: 10 INJECTION INTRAVENOUS at 09:41

## 2018-01-01 RX ADMIN — BUDESONIDE AND FORMOTEROL FUMARATE DIHYDRATE 2 PUFF: 160; 4.5 AEROSOL RESPIRATORY (INHALATION) at 19:16

## 2018-01-01 RX ADMIN — METOPROLOL TARTRATE 50 MG: 50 TABLET ORAL at 09:25

## 2018-01-01 RX ADMIN — ESTROGENS, CONJUGATED 0.62 MG: 0.62 TABLET, FILM COATED ORAL at 20:48

## 2018-01-01 RX ADMIN — GUAIFENESIN 1200 MG: 600 TABLET, EXTENDED RELEASE ORAL at 09:34

## 2018-01-01 RX ADMIN — GUAIFENESIN 1200 MG: 600 TABLET, EXTENDED RELEASE ORAL at 08:27

## 2018-01-01 RX ADMIN — DIPHENHYDRAMINE HYDROCHLORIDE 25 MG: 25 CAPSULE ORAL at 09:40

## 2018-01-01 RX ADMIN — APIXABAN 5 MG: 5 TABLET, FILM COATED ORAL at 20:48

## 2018-01-01 RX ADMIN — LEVETIRACETAM 500 MG: 5 INJECTION INTRAVENOUS at 23:21

## 2018-01-01 RX ADMIN — DILTIAZEM HYDROCHLORIDE 10 MG/HR: 5 INJECTION INTRAVENOUS at 22:15

## 2018-01-01 RX ADMIN — ACETAMINOPHEN 650 MG: 325 TABLET, FILM COATED ORAL at 16:15

## 2018-01-01 RX ADMIN — TECHNETIUM TC 99M OXIDRONATE 1 DOSE: 3.15 INJECTION, POWDER, LYOPHILIZED, FOR SOLUTION INTRAVENOUS at 11:54

## 2018-01-01 RX ADMIN — METOPROLOL TARTRATE 50 MG: 50 TABLET ORAL at 20:51

## 2018-01-01 RX ADMIN — SODIUM CHLORIDE 250 ML: 9 INJECTION, SOLUTION INTRAVENOUS at 08:50

## 2018-01-01 RX ADMIN — PREDNISONE 5 MG: 5 TABLET ORAL at 08:16

## 2018-01-01 RX ADMIN — PREDNISONE 5 MG: 5 TABLET ORAL at 08:27

## 2018-01-01 RX ADMIN — METOPROLOL TARTRATE 50 MG: 50 TABLET ORAL at 20:17

## 2018-01-01 RX ADMIN — POTASSIUM CHLORIDE AND SODIUM CHLORIDE 100 ML/HR: 900; 150 INJECTION, SOLUTION INTRAVENOUS at 23:21

## 2018-01-01 RX ADMIN — IRON SUCROSE 200 MG: 20 INJECTION, SOLUTION INTRAVENOUS at 10:39

## 2018-01-01 RX ADMIN — GUAIFENESIN 1200 MG: 600 TABLET, EXTENDED RELEASE ORAL at 20:48

## 2018-01-01 RX ADMIN — DILTIAZEM HYDROCHLORIDE 5 MG/HR: 5 INJECTION INTRAVENOUS at 03:19

## 2018-01-01 RX ADMIN — METOPROLOL TARTRATE 12.5 MG: 25 TABLET, FILM COATED ORAL at 08:16

## 2018-01-01 RX ADMIN — APIXABAN 5 MG: 5 TABLET, FILM COATED ORAL at 08:27

## 2018-01-01 RX ADMIN — METOPROLOL TARTRATE 12.5 MG: 25 TABLET, FILM COATED ORAL at 09:07

## 2018-01-01 RX ADMIN — DOCUSATE SODIUM AND SENNOSIDES 2 TABLET: 8.6; 5 TABLET, FILM COATED ORAL at 09:34

## 2018-01-01 RX ADMIN — DEXAMETHASONE SODIUM PHOSPHATE 4 MG: 4 INJECTION, SOLUTION INTRA-ARTICULAR; INTRALESIONAL; INTRAMUSCULAR; INTRAVENOUS; SOFT TISSUE at 10:28

## 2018-01-01 RX ADMIN — DOCUSATE SODIUM AND SENNOSIDES 2 TABLET: 8.6; 5 TABLET, FILM COATED ORAL at 09:25

## 2018-01-01 RX ADMIN — DOCETAXEL 140 MG: 20 INJECTION, SOLUTION, CONCENTRATE INTRAVENOUS at 12:09

## 2018-01-01 RX ADMIN — DEXAMETHASONE SODIUM PHOSPHATE 4 MG: 4 INJECTION, SOLUTION INTRA-ARTICULAR; INTRALESIONAL; INTRAMUSCULAR; INTRAVENOUS; SOFT TISSUE at 05:23

## 2018-01-01 RX ADMIN — DIPHENHYDRAMINE HYDROCHLORIDE 25 MG: 50 INJECTION, SOLUTION INTRAMUSCULAR; INTRAVENOUS at 12:37

## 2018-01-01 RX ADMIN — PREDNISONE 5 MG: 5 TABLET ORAL at 09:45

## 2018-01-01 RX ADMIN — HYDROCORTISONE SODIUM SUCCINATE 100 MG: 100 INJECTION, POWDER, FOR SOLUTION INTRAMUSCULAR; INTRAVENOUS at 09:45

## 2018-01-01 RX ADMIN — BUDESONIDE AND FORMOTEROL FUMARATE DIHYDRATE 2 PUFF: 160; 4.5 AEROSOL RESPIRATORY (INHALATION) at 20:39

## 2018-01-01 RX ADMIN — ESTROGENS, CONJUGATED 0.62 MG: 0.62 TABLET, FILM COATED ORAL at 20:56

## 2018-01-01 RX ADMIN — VALSARTAN 20 MG: 40 TABLET ORAL at 09:24

## 2018-01-01 RX ADMIN — METOPROLOL TARTRATE 12.5 MG: 25 TABLET, FILM COATED ORAL at 08:08

## 2018-01-01 RX ADMIN — DIGOXIN 125 MCG: 0.12 TABLET ORAL at 12:17

## 2018-01-01 RX ADMIN — METOPROLOL TARTRATE 50 MG: 50 TABLET ORAL at 09:34

## 2018-01-01 RX ADMIN — METOPROLOL TARTRATE 25 MG: 25 TABLET, FILM COATED ORAL at 21:13

## 2018-01-01 RX ADMIN — PREDNISONE 5 MG: 5 TABLET ORAL at 09:34

## 2018-01-01 RX ADMIN — DEXAMETHASONE SODIUM PHOSPHATE 4 MG: 4 INJECTION, SOLUTION INTRA-ARTICULAR; INTRALESIONAL; INTRAMUSCULAR; INTRAVENOUS; SOFT TISSUE at 23:35

## 2018-01-01 RX ADMIN — POTASSIUM CHLORIDE AND SODIUM CHLORIDE 100 ML/HR: 900; 150 INJECTION, SOLUTION INTRAVENOUS at 11:44

## 2018-01-01 RX ADMIN — SODIUM CHLORIDE 250 ML: 9 INJECTION, SOLUTION INTRAVENOUS at 10:39

## 2018-01-01 RX ADMIN — PREDNISONE 5 MG: 5 TABLET ORAL at 21:00

## 2018-01-01 RX ADMIN — HYDROMORPHONE HYDROCHLORIDE 2 MG: 2 TABLET ORAL at 20:16

## 2018-01-01 RX ADMIN — ONDANSETRON HYDROCHLORIDE 16 MG: 2 SOLUTION INTRAMUSCULAR; INTRAVENOUS at 11:14

## 2018-01-01 RX ADMIN — IRON SUCROSE 200 MG: 20 INJECTION, SOLUTION INTRAVENOUS at 10:32

## 2018-01-01 RX ADMIN — ESTROGENS, CONJUGATED 0.62 MG: 0.62 TABLET, FILM COATED ORAL at 21:14

## 2018-01-01 RX ADMIN — FUROSEMIDE 20 MG: 10 INJECTION, SOLUTION INTRAVENOUS at 15:59

## 2018-01-01 RX ADMIN — BUDESONIDE AND FORMOTEROL FUMARATE DIHYDRATE 2 PUFF: 160; 4.5 AEROSOL RESPIRATORY (INHALATION) at 06:32

## 2018-01-01 RX ADMIN — FUROSEMIDE 40 MG: 10 INJECTION, SOLUTION INTRAMUSCULAR; INTRAVENOUS at 11:56

## 2018-01-01 RX ADMIN — MONTELUKAST SODIUM 10 MG: 10 TABLET, FILM COATED ORAL at 20:17

## 2018-01-01 RX ADMIN — FUROSEMIDE 20 MG: 20 TABLET ORAL at 09:24

## 2018-01-01 RX ADMIN — BUDESONIDE AND FORMOTEROL FUMARATE DIHYDRATE 2 PUFF: 160; 4.5 AEROSOL RESPIRATORY (INHALATION) at 07:41

## 2018-01-01 RX ADMIN — GUAIFENESIN 1200 MG: 600 TABLET, EXTENDED RELEASE ORAL at 21:14

## 2018-01-01 RX ADMIN — MONTELUKAST SODIUM 10 MG: 10 TABLET, FILM COATED ORAL at 21:14

## 2018-01-01 RX ADMIN — APIXABAN 5 MG: 5 TABLET, FILM COATED ORAL at 08:16

## 2018-01-01 RX ADMIN — APIXABAN 5 MG: 5 TABLET, FILM COATED ORAL at 21:00

## 2018-01-01 RX ADMIN — PREDNISONE 5 MG: 5 TABLET ORAL at 08:32

## 2018-01-01 RX ADMIN — APIXABAN 5 MG: 5 TABLET, FILM COATED ORAL at 11:08

## 2018-01-01 RX ADMIN — BUDESONIDE AND FORMOTEROL FUMARATE DIHYDRATE 2 PUFF: 160; 4.5 AEROSOL RESPIRATORY (INHALATION) at 18:43

## 2018-01-01 RX ADMIN — ESTROGENS, CONJUGATED 0.62 MG: 0.62 TABLET, FILM COATED ORAL at 20:32

## 2018-01-01 RX ADMIN — MONTELUKAST SODIUM 10 MG: 10 TABLET, FILM COATED ORAL at 20:49

## 2018-01-01 RX ADMIN — GUAIFENESIN 1200 MG: 600 TABLET, EXTENDED RELEASE ORAL at 20:33

## 2018-01-01 RX ADMIN — APIXABAN 5 MG: 5 TABLET, FILM COATED ORAL at 21:14

## 2018-01-01 RX ADMIN — FENTANYL 1 PATCH: 12 PATCH, EXTENDED RELEASE TRANSDERMAL at 11:55

## 2018-01-01 RX ADMIN — ACETAMINOPHEN 650 MG: 325 TABLET, FILM COATED ORAL at 21:00

## 2018-01-01 RX ADMIN — DOCUSATE SODIUM AND SENNOSIDES 2 TABLET: 8.6; 5 TABLET, FILM COATED ORAL at 08:08

## 2018-01-01 RX ADMIN — IRON SUCROSE 200 MG: 20 INJECTION, SOLUTION INTRAVENOUS at 10:27

## 2018-01-01 RX ADMIN — DEXAMETHASONE SODIUM PHOSPHATE 4 MG: 4 INJECTION, SOLUTION INTRA-ARTICULAR; INTRALESIONAL; INTRAMUSCULAR; INTRAVENOUS; SOFT TISSUE at 11:45

## 2018-01-01 RX ADMIN — PREDNISONE 5 MG: 5 TABLET ORAL at 20:48

## 2018-01-01 RX ADMIN — APIXABAN 5 MG: 5 TABLET, FILM COATED ORAL at 09:34

## 2018-01-01 RX ADMIN — DILTIAZEM HYDROCHLORIDE 5 MG/HR: 5 INJECTION INTRAVENOUS at 06:35

## 2018-01-01 RX ADMIN — DIGOXIN 125 MCG: 0.12 TABLET ORAL at 17:29

## 2018-01-01 RX ADMIN — DOCETAXEL 140 MG: 20 INJECTION, SOLUTION, CONCENTRATE INTRAVENOUS at 10:39

## 2018-02-01 NOTE — TELEPHONE ENCOUNTER
Returned call to patient, to notify him that I had finally received call from Deneen at Dr Palmer office. We had discussed patient and she is faxing his records here to our office later this afternoon or first thing in the am Friday 2/2/18, her time allowing. I also informed him that both Physicians Dr. Palmer and Dr Doyle had exchanged cell phone numbers and waiting to discuss patients care plan. I informed Mr. Jones that once records are received in office tomorrow am, our  will call to paulie consultation visit next week, he questioned why so soon, he was not due for another treatment until the end of February. I informed him that he needed to come in to meet with Dr Doyle and to get familiar with her, discuss his diagnosis and treatment plans so she can answer any questions or concerns he may have. Also informed him that the chemotherapy drugs will have to be paulie and ordered prior to his paulie txt. He v/u of our conversation and will be waiting for our phone call tomorrow 2/2/18.

## 2018-02-01 NOTE — PROGRESS NOTES
Visit was made to our office by Zoie Talley, she was sent upstairs by Mally (RN) Out Patient Supervisor [facility]; to check on the status of a possible transfer of a patient here from [facility]. The whole situation was very unclear until we were able to investigate the whole situation, we did not even have the correct name of the patient. She states (Zoie) that we should be expecting a phone call from [facility] from a Physician for a release of information and records regarding this patient, both Physicians; Dr Welch and Dr Doyle were both questioned if they had tried to contact a Physician at [facility], both said no. We were finally able to clarify the patients name, Jay Jones, I tried several times to reach the patient, but home phone was busy and unable to contact patient, message was left via voice mail, for patient to please contact our office, (we did not even know the name of the Physician wanting to make the referral) that was trying to contact our office. Zoie was finally able to pull up a [facility] Physician Directory, and after 3 phone calls I was  able to reach the patients Physician Office. Message was left for Dr Ryanne Palmer to return our call to Dr Doyle so she could discuss patient care and the referral process could begin. Zoie left her Cell Phone number [phone] for me to please notify her once the connection between the two Physicians was made and the patient referral process had begun.

## 2018-02-01 NOTE — TELEPHONE ENCOUNTER
Received call from Deneen, Primary Nurse of Dr Palmer, she said she was taking her phone calls off for Dr Palmer and had received my phone messages. I informed her that we had been trying all day to get Dr Romero and Dr Doyle connected. We discussed patient Mr. Jones and his request for referral to our facility, this is closer to his home. She said she would be more than happy to send Mr. [facility] records out to us, she had already sent them to [place] for review. She was given our office fax number, and also offered to give me Dr Palmer personal cell phone number, along with their office number again, I informed her that we had already tracked this down i had already spoken with Dr Palmer earlier around 1:45 pm and she was in a meeting and unable to speak to Dr Doyle at that time, so I gave her Dr Doyle personal cell phone number for her to call her back, and I was told it would be around 30 minutes before she could return the call. To date we have not heard from Dr Palmer.   Deneen apologized, said they had been very busy and said she will fax out records late this afternoon, her time allowing and if not first thing in the am, Friday 2/2/18.   *Relayed message to Dr Doyle, she gave me permission to go ahead and get the patient paulie for Consultation and not to wait, she did say if patient had been receiving chemotherapy treatment while at [facility] she needed to review his recent records along with his chemotherapy plan, which will be included in his records to be faxed. We will gather the records in the am 2/2/18 and apt will be paulie for patient per Dr Doyle request ASAP per Mr. Jones's schedule.    *Called and spoke with Zoie Talley regarding this recent phone message to give her update.

## 2018-02-01 NOTE — TELEPHONE ENCOUNTER
1:44 pm: Call placed for Dr Doyle to number listed on fax sheet to Physician Ryanne Palmer, unable to make connection; message was left for Dr Palmer to return Dr Doyle's phone call, our office number and brief message regarding patient and reason for the call was left.  * Will try again 1 hour.

## 2018-02-01 NOTE — TELEPHONE ENCOUNTER
2:56 pm: Called Zoie to give her a update on the Physician Contact Situation at [facility], I informed Zoie that I finally had made contact with Dr Ryanne Walden, she states she had been receiving my phone calls via her cell phone and office, but that she was presently in a meeting and unable to speak with our Physician Dr Doyle at that time, and she would give Dr Doyle a call back in aprox 30 minutes post completion of her meeting to discuss Mr. Jones's referral to our facility. I did give her Dr Doyle cell phone number.   Dr Doyle does have her cell phone on body and is waiting for the phone call

## 2018-02-01 NOTE — PROGRESS NOTES
Received additional information from Zoie Talley, she returned to the office with a fax from Dr Palmer office regarding this patient, it did have her office number and mobile, listed, this is how we are to contact her, she states she will not go thru a phone tree to retreive Phone messages, she is out of office today.   *Message was given to Dr Doyle with Dr Palmer direct Cell phone number along with a update of this situation so she can contact her.

## 2018-02-01 NOTE — TELEPHONE ENCOUNTER
Finally I was able to make contact with Dr Jesus Walden, for Dr Doyle, but they were unable to speak, Dr Tran was currently in a meeting and unable to discuss issues. I did give her Dr Doyle Cell Phone number and she did tell me to relay the message she will call Dr Doyle in aprox 30 minutes at meetings end on her cell phone.

## 2018-02-02 NOTE — TELEPHONE ENCOUNTER
Received call from patient on Nurse Line, he requested that I return his call. Unable to connect with patient, message was left on his voice mail to return my call along with my phone number.

## 2018-02-02 NOTE — TELEPHONE ENCOUNTER
Follow up call to Zoie Talley, to notify her that apt was now paulie for patient Mr. Jones for Tuesday 2/6/18 @ 7:45 am.

## 2018-02-06 PROBLEM — C79.51 PROSTATE CANCER METASTATIC TO BONE (HCC): Status: ACTIVE | Noted: 2018-01-01

## 2018-02-06 PROBLEM — C61 PROSTATE CA (HCC): Chronic | Status: ACTIVE | Noted: 2018-01-01

## 2018-02-06 PROBLEM — C61 PROSTATE CANCER METASTATIC TO BONE (HCC): Status: ACTIVE | Noted: 2018-01-01

## 2018-02-06 NOTE — PROGRESS NOTES
[facility]  HEMATOLOGY & ONCOLOGY        Subjective     VISIT DIAGNOSIS:   Encounter Diagnoses   Name Primary?   • Prostate CA Yes   • Anemia, unspecified type        REASON FOR VISIT:     Chief Complaint   Patient presents with   • Consult   • Leg Swelling     Patient presents with swelling to both lower extremities, with 3+pitting edema, no skin breakdown noted, he denies pain in calves or thigh x both at this time. He currently has lasix which he uses prn, along with K+. Discussed ways to help reduce the swelling, cutting salt out of diet, elevating lower extremites above heart, increase water intak, notified Dr. BELTRÁN of swelling issue         HEMATOLOGY / ONCOLOGY HISTORY:    No history exists.     [No treatment plan]  Cancer Staging Information:  No matching staging information was found for the patient.      INTERVAL HISTORY  Patient ID: Jay Jones is a 77 y.o. year old male presentation to continue chemotherapy for his metastatic prostate cancer.  He complains of excessive fatigue after his chemotherapy.  He does have occasional left hip pain, left femur pain, and right scapula pain.  His last Lupron shot was in January 25 at [facility].  to be done every 6 months.  He does have bilateral lower estimated edema which is chronic.  This is secondary to his congestive heart failure.  He also has a history of bypass.  He takes Lasix and it will go down but he does not like to take Lasix all the time.    Review of Systems         Medications:    Current Outpatient Prescriptions   Medication Sig Dispense Refill   • aspirin 81 MG EC tablet Take 81 mg by mouth Daily.     • CALCIUM PO Take  by mouth.     • furosemide (LASIX) 40 MG tablet TAKE 1 TABLET BY MOUTH AS NEEDED EDEMA  2   • KLOR-CON 10 MEQ CR tablet TAKE 1 TABLET DAILY  6   • metFORMIN XR (GLUCOPHAGE-XR) 500 MG 24 hr tablet TAKE 1 TABLET TWICE A DAY  3   • rosuvastatin (CRESTOR) 40 MG tablet TAKE 1 TABLET BY MOUTH EVERY NIGHT AT BEDTIME  6   •  SYMBICORT 160-4.5 MCG/ACT inhaler USE 2 PUFFS DAILY  7   • valsartan (DIOVAN) 40 MG tablet TAKE 1 TABLET EVERY DAY  3     No current facility-administered medications for this visit.        ALLERGIES:    Allergies   Allergen Reactions   • Codeine    • Percocet [Oxycodone-Acetaminophen]        Objective      Vitals:    02/06/18 0800   BP: 116/70   Pulse: 80   Resp: 18   Temp: 97.8 °F (36.6 °C)   SpO2: 98%       Current Status 2/6/2018   ECOG score 0       General Appearance: Patient is awake, alert, oriented and in no acute distress. Patient is welldeveloped, wellnourished, and appears stated age.  HEENT: Normocephalic. Sclerae clear, conjunctiva pink, extraocular movements intact, pupils, round, reactive to light and  accommodation. Mouth and throat are clear with moist oral mucosa.  NECK: Supple, no jugular venous distention, thyroid not enlarged.  LYMPH: No cervical, supraclavicular, axillary, or inguinal lymphadenopathy.  CHEST: Equal bilateral expansion, AP  diameter normal, resonant percussion note  LUNGS: Good air movement, no rales, rhonchi, rubs or wheezes with auscultation  CARDIO: Regular sinus rhythm, no murmurs, gallops or rubs.  ABDOMEN: Nondistended, soft, No tenderness, no guarding, no rebound, No hepatosplenomegaly. No abdominal masses. Bowel sounds positive. No hernia  GENITALIA: Not examined.  BREASTS: Not examined.  MUSKEL:Bilateral lower extremity edema 3+, no decreased motion, or inflammation  EXTREMS: No edema, clubbing, cyanosis, No varicose veins.  NEURO: Grossly nonfocal, Gait is coordinated and smooth, Cognition is preserved.  SKIN: No rashes, no ecchymoses, no petechia.  PSYCH: Oriented to time, place and person. Memory is preserved. Mood and affect appear normal      RECENT LABS:  Lab on 02/06/2018   Component Date Value Ref Range Status   • Glucose 02/06/2018 108* 70 - 100 mg/dL Final   • BUN 02/06/2018 17  5 - 21 mg/dL Final   • Creatinine 02/06/2018 0.65  0.50 - 1.40 mg/dL Final   •  Sodium 02/06/2018 141  135 - 145 mmol/L Final   • Potassium 02/06/2018 3.8  3.5 - 5.3 mmol/L Final   • Chloride 02/06/2018 104  98 - 110 mmol/L Final   • CO2 02/06/2018 28.0  24.0 - 31.0 mmol/L Final   • Calcium 02/06/2018 8.6  8.4 - 10.4 mg/dL Final   • Total Protein 02/06/2018 5.9* 6.3 - 8.7 g/dL Final   • Albumin 02/06/2018 3.30* 3.50 - 5.00 g/dL Final   • ALT (SGPT) 02/06/2018 25  0 - 54 U/L Final   • AST (SGOT) 02/06/2018 42  7 - 45 U/L Final   • Alkaline Phosphatase 02/06/2018 440* 24 - 120 U/L Final   • Total Bilirubin 02/06/2018 0.7  0.1 - 1.0 mg/dL Final   • eGFR Non African Amer 02/06/2018 120  >60 mL/min/1.73 Final   • Globulin 02/06/2018 2.6  gm/dL Final   • A/G Ratio 02/06/2018 1.3  1.1 - 2.5 g/dL Final   • BUN/Creatinine Ratio 02/06/2018 26.2*   Final   • Anion Gap 02/06/2018 9.0  4.0 - 13.0 mmol/L Final   • WBC 02/06/2018 4.40* 4.80 - 10.80 10*3/mm3 Final   • RBC 02/06/2018 2.95* 4.20 - 5.40 10*6/mm3 Final   • Hemoglobin 02/06/2018 8.9* 14.0 - 18.0 g/dL Final   • Hematocrit 02/06/2018 27.5* 40.0 - 52.0 % Final   • MCV 02/06/2018 93.2  82.0 - 95.0 fL Final   • MCH 02/06/2018 30.2  28.0 - 32.0 pg Final   • MCHC 02/06/2018 32.4* 33.0 - 36.0 g/dL Final   • RDW 02/06/2018 20.0* 12.0 - 15.0 % Final   • MPV 02/06/2018 8.1  6.0 - 12.0 fL Final   • Platelets 02/06/2018 123* 130 - 400 10*3/mm3 Final   • Neutrophil % 02/06/2018 40.5  39.0 - 78.0 % Final   • Lymphocyte % 02/06/2018 41.4  15.0 - 45.0 % Final   • Auto Mixed Cells % 02/06/2018 18.1  0.1 - 24.0 % Final   • Neutrophils, Absolute 02/06/2018 1.80  1.50 - 8.30 10*3/mm3 Final   • Lymphocytes, Absolute 02/06/2018 1.80  0.80 - 7.00 10*3/mm3 Final   • Auto Mixed Cells # 02/06/2018 0.80  0.10 - 2.60 10*3/mm3 Final       RADIOLOGY:  No results found.         Assessment/Plan 77-year-old man with metastatic prostate cancer mainly bone being treated on Dox attacks plus prednisone with good tolerance.    1.  Prostate cancer: We will continue treatment every 21  days.  This is shown concerning his present management to be made by his [facility] physician and were just here for support.  -- We will continue Lupron shot every 6 months.  2.  Bone metastases: I will discuss starting Xgeva next visit.  Continue calcium.    3.  Congestive heart failure: The patient last a call was seen in December.  Continue Diovan, furosemide,    4.  Diabetes mellitus continue metformin.  5.  Anemia: We will check iron panel B12 folate.  6.  Hyperlipidemia continue Crestor.       Time Spent: 60 minutes; greater than  50% of time was spent in patient counseling and care coordination.     Ash Ojeda MD    2/6/2018    8:45 AM

## 2018-02-07 NOTE — TELEPHONE ENCOUNTER
Called Jay to schedule his next chemo on 2/15/18.  Patient wishes to go at 0830 on 2/15/18.  Scheduled with Nubia in outpatient infusion center.

## 2018-02-07 NOTE — TELEPHONE ENCOUNTER
----- Message from Ash Ojeda MD sent at 2/6/2018  8:51 AM CST -----  plz schedule chemo 2/15/18. rtc march 8th

## 2018-02-14 NOTE — TELEPHONE ENCOUNTER
Notified Audrey that the chemo has now been approved to be given here and is scheduled for 0830 tomorrow.

## 2018-02-14 NOTE — TELEPHONE ENCOUNTER
Notified Audrey that Jay's chemotherapy has not been approved to be given at our facility yet, so Jay will not be able to get his chemotherapy tomorrow and we will notify them as soon as we get the approval.

## 2018-03-08 NOTE — PROGRESS NOTES
1035 s/w Sherly RN at office re: hgb of 7.8. Per Dr. Ojeda proceed with chemo treatment then infuse one unit of PRBC's . Markus Cochran RN

## 2018-03-08 NOTE — PROGRESS NOTES
1325 Patient not sure if he has had a blood transfusion in past, could have with his open heart surgery. Gave and discussed blood transfusion sheet, verbalized understanding.Arun TONG

## 2018-03-08 NOTE — PROGRESS NOTES
[facility]  HEMATOLOGY & ONCOLOGY    Cancer Staging Information:  No matching staging information was found for the patient.      Subjective     VISIT DIAGNOSIS:   Encounter Diagnoses   Name Primary?   • Prostate cancer metastatic to bone    • Anemia, unspecified type Yes   • Heart palpitations        REASON FOR VISIT:     Chief Complaint   Patient presents with   • Outpatient Infusion     He is here for consideration of his txt today. He continues to have 3+ pitting edema of his lower extremities, and says he only takes his Lasix and K+ on occasion. He also says that by the end of his cycle he is begining to wear down and notes more pain in his hips and groin areas, usually 3 days prior to txt and 2-3 days post txt, then he states he has good days afterwards. Appetite is good, and eating/drinking.         HEMATOLOGY / ONCOLOGY HISTORY:   Oncology/Hematology History    7 1013 surgical resection on our ALP, Bone Gap 9, PTT 3 BN 1.  PSA increase increased from 0.9 December 2013 to 4.76 June 2014 to 11.29 September 2014.  Bone scan September 2014 show uptake in the right scapula.  Lupron started October 2014 to present.  This is  October 2014 for one month.  PSA undetectable until March 2016.  07/03/2016 PSA 2.6.  Casodex restarted 07/15/2016.  CT and bone scan 08/15/2016 showed metastasis in the left hemipelvis.  Left scapula, lymphadenopathy and presacral and perineural to area.  DEXA showed osteoporotic February 2017 completed SipurucelT.  November 2016 started enzalutamide discontinued August 2017.  Started arbiraterone and prednisone 08/18/2017.  Bone scan 09/19/17. Extensive skeletal metastases with progression  - CT scan 09/19/17. Progression of osseous metastatic disease with a destructive lesion involving the left iliac wing. Mildly prominent perirectalnodes.   - 10/12/2017. Started Radium 223, received one additional dose on 11/09/2017. Sought second opinion with [facility] (Dr. Ryanne Palmer)  due to clinical disease progression.   -12/14/2017. Started Docetaxel with Dr. Palmer of [facility]. Received #2 on 01/04/2018.   -January 25 218 receives he is status post cycle 4 of docetaxel plus prednisone at [facility], [place] and admitted several Dr. Adames.        Prostate cancer metastatic to bone    2/6/2018 Initial Diagnosis     Prostate cancer metastatic to bone              INTERVAL HISTORY  Patient ID: Jay Jones is a 77 y.o. year old male  Jay Jones is a 77 y.o. year old male presentation to continue chemotherapy for his metastatic prostate cancer.  He complains of excessive fatigue after his chemotherapy.  He does have occasional left hip pain, left femur pain, and right scapula pain.  His last Lupron shot was in January 25 at [place].  to be done every 6 months.  He does have bilateral lower estimated edema which is chronic.  This is secondary to his congestive heart failure.  He also has a history of bypass.  He takes Lasix and it will go down but he does not like to take Lasix all the time.  - had palpitation. His cardiologist will give him ambulatory monitor  Past Medical History:   Past Medical History:   Diagnosis Date   • Asthma     ASTHMA NOS W/ACUTE EXACERBATION   • CAD in native artery    • Cancer    • Coronary artery disease    • HLD (hyperlipidemia)     Hyperlipidemia, unspecified   • Infectious viral hepatitis     HEPATITIS A, VIRAL, W/O HEPATIC COMA   • Prostate CA 2/6/2018   • Prostate cancer metastatic to bone 2/6/2018   • Unspecified atrial flutter      Past Surgical History:   Past Surgical History:   Procedure Laterality Date   • APPENDECTOMY     • CARDIAC CATHETERIZATION Left 11/01/2006    -with findings of three-vessel coronary artery disease with normal left ventricular function.   • CHOLECYSTECTOMY     • CORONARY ARTERY BYPASS GRAFT  11/03/2006    CABG times five with LIMA grafted in a sequential fashion to the first diagonal and left anterior descending followed by a saphenous vein  graft to the first and second obtuse marginal branches and then an individual saphenous vein graft to the right coronary artery.   • OTHER SURGICAL HISTORY  02/28/2007    electrocardioversion   • PROSTATECTOMY       Social History:   Social History     Social History   • Marital status:      Spouse name: N/A   • Number of children: N/A   • Years of education: N/A     Occupational History   • Not on file.     Social History Main Topics   • Smoking status: Light Tobacco Smoker     Types: Cigars   • Smokeless tobacco: Never Used   • Alcohol use No   • Drug use: No   • Sexual activity: Defer     Other Topics Concern   • Not on file     Social History Narrative     Family History:   Family History   Problem Relation Age of Onset   • Heart disease Father    • Heart attack Father    • Heart failure Father    • Heart disease Maternal Grandfather    • Alzheimer's disease Mother    • Heart disease Other        Review of Systems   Constitutional: Negative.    HENT: Negative.    Eyes: Negative.    Respiratory: Negative.    Cardiovascular: Positive for palpitations.   Gastrointestinal: Negative.    Genitourinary: Negative.    Musculoskeletal:        Micha latonya    Skin: Negative.    Neurological: Negative.    Hematological: Negative.    Psychiatric/Behavioral: Negative.         Performance Status:  Symptomatic; fully ambulatory    Medications:    Current Outpatient Prescriptions   Medication Sig Dispense Refill   • aspirin 81 MG EC tablet Take 81 mg by mouth Daily.     • CALCIUM PO Take  by mouth.     • furosemide (LASIX) 40 MG tablet TAKE 1 TABLET BY MOUTH AS NEEDED EDEMA  2   • KLOR-CON 10 MEQ CR tablet TAKE 1 TABLET DAILY  6   • metFORMIN XR (GLUCOPHAGE-XR) 500 MG 24 hr tablet TAKE 1 TABLET TWICE A DAY  3   • montelukast (SINGULAIR) 10 MG tablet Take 10 mg by mouth Every Night.     • predniSONE (DELTASONE) 5 MG tablet Take 5 mg by mouth 2 (Two) Times a Day.     • rosuvastatin (CRESTOR) 40 MG tablet TAKE 1 TABLET BY MOUTH  "EVERY NIGHT AT BEDTIME  6   • sennosides-docusate sodium (SENOKOT-S) 8.6-50 MG tablet Take 1 tablet by mouth 4 (Four) Times a Day.     • valsartan (DIOVAN) 40 MG tablet TAKE 1 TABLET EVERY DAY  3   • SYMBICORT 160-4.5 MCG/ACT inhaler USE 2 PUFFS DAILY  7     No current facility-administered medications for this visit.      Facility-Administered Medications Ordered in Other Visits   Medication Dose Route Frequency Provider Last Rate Last Dose   • diphenhydrAMINE (BENADRYL) 25 mg in sodium chloride 0.9 % 50 mL IVPB  25 mg Intravenous Once Ash Ojeda MD       • DOCEtaxel (TAXOTERE) 140 mg in sodium chloride 0.9 % 257 mL chemo IVPB  75 mg/m2 (Treatment Plan Recorded) Intravenous Once Ash Ojeda MD       • ondansetron (ZOFRAN) 16 mg in sodium chloride 0.9 % 50 mL IVPB  16 mg Intravenous Once Ash Ojeda MD       • sodium chloride 0.9 % infusion 250 mL  250 mL Intravenous Once Ash Ojeda MD           ALLERGIES:    Allergies   Allergen Reactions   • Codeine    • Percocet [Oxycodone-Acetaminophen]        Objective      Vitals:    03/08/18 0955   BP: 106/68   Pulse: 85   Resp: 18   Temp: 97.4 °F (36.3 °C)   TempSrc: Tympanic   SpO2: 98%   Weight: 68.7 kg (151 lb 8 oz)   Height: 177.8 cm (70\")         Current Status 3/8/2018   ECOG score 2         Physical Exam  General Appearance: Pale and frail appearing. Patient is awake, alert, oriented and in no acute distress. Patient is welldeveloped, wellnourished, and appears stated age.  HEENT: Normocephalic. Sclerae clear, conjunctiva pink, extraocular movements intact, pupils, round, reactive to light and  accommodation. Mouth and throat are clear with moist oral mucosa.  NECK: Supple, no jugular venous distention, thyroid not enlarged.  LYMPH: No cervical, supraclavicular, axillary, or inguinal lymphadenopathy.  CHEST: Equal bilateral expansion, AP  diameter normal, resonant percussion note  LUNGS: Good air movement, " no rales, rhonchi, rubs or wheezes with auscultation  CARDIO: Regular sinus rhythm, no murmurs, gallops or rubs.  ABDOMEN: Nondistended, soft, No tenderness, no guarding, no rebound, No hepatosplenomegaly. No abdominal masses. Bowel sounds positive. No hernia  GENITALIA: Not examined.  BREASTS: Not examined.  MUSKEL: No joint swelling, decreased motion, or inflammation  EXTREMS: nhan LE edema, No clubbing, cyanosis, No varicose veins.  NEURO: Grossly nonfocal, Gait is coordinated and smooth, Cognition is preserved.  SKIN: No rashes, no ecchymoses, no petechia.  PSYCH: Oriented to time, place and person. Memory is preserved. Mood and affect appear normal  RECENT LABS:  Orders Only on 03/08/2018   Component Date Value Ref Range Status   • Glucose 03/08/2018 117* 70 - 100 mg/dL Final   • BUN 03/08/2018 17  5 - 21 mg/dL Final   • Creatinine 03/08/2018 0.56  0.50 - 1.40 mg/dL Final   • Sodium 03/08/2018 139  135 - 145 mmol/L Final   • Potassium 03/08/2018 3.4* 3.5 - 5.3 mmol/L Final   • Chloride 03/08/2018 101  98 - 110 mmol/L Final   • CO2 03/08/2018 29.0  24.0 - 31.0 mmol/L Final   • Calcium 03/08/2018 8.5  8.4 - 10.4 mg/dL Final   • Total Protein 03/08/2018 6.1* 6.3 - 8.7 g/dL Final   • Albumin 03/08/2018 3.30* 3.50 - 5.00 g/dL Final   • ALT (SGPT) 03/08/2018 30  0 - 54 U/L Final   • AST (SGOT) 03/08/2018 60* 7 - 45 U/L Final   • Alkaline Phosphatase 03/08/2018 426* 24 - 120 U/L Final   • Total Bilirubin 03/08/2018 0.5  0.1 - 1.0 mg/dL Final   • eGFR Non African Amer 03/08/2018 142  >60 mL/min/1.73 Final   • Globulin 03/08/2018 2.8  gm/dL Final   • A/G Ratio 03/08/2018 1.2  1.1 - 2.5 g/dL Final   • BUN/Creatinine Ratio 03/08/2018 30.4* 7.0 - 25.0 Final   • Anion Gap 03/08/2018 9.0  4.0 - 13.0 mmol/L Final   Lab on 03/08/2018   Component Date Value Ref Range Status   • WBC 03/08/2018 6.39  4.80 - 10.80 10*3/mm3 Final   • RBC 03/08/2018 2.62* 4.80 - 5.90 10*6/mm3 Final   • Hemoglobin 03/08/2018 7.8* 14.0 - 18.0 g/dL  Final   • Hematocrit 03/08/2018 25.1* 40.0 - 52.0 % Final   • MCV 03/08/2018 95.8* 82.0 - 95.0 fL Final   • MCH 03/08/2018 29.8  28.0 - 32.0 pg Final   • MCHC 03/08/2018 31.1* 33.0 - 36.0 g/dL Final   • RDW 03/08/2018 20.1* 12.0 - 15.0 % Final   • RDW-SD 03/08/2018 70.7* 40.0 - 54.0 fl Final   • MPV 03/08/2018 9.5  6.0 - 12.0 fL Final   • Platelets 03/08/2018 142  130 - 400 10*3/mm3 Final   • Neutrophil % 03/08/2018 72.7  39.0 - 78.0 % Final   • Lymphocyte % 03/08/2018 13.5* 15.0 - 45.0 % Final   • Monocyte % 03/08/2018 8.8  4.0 - 12.0 % Final   • Eosinophil % 03/08/2018 0.0  0.0 - 4.0 % Final   • Basophil % 03/08/2018 0.3  0.0 - 2.0 % Final   • Neutrophils, Absolute 03/08/2018 4.65  1.87 - 8.40 10*3/mm3 Final   • Lymphocytes, Absolute 03/08/2018 0.86  0.72 - 4.86 10*3/mm3 Final   • Monocytes, Absolute 03/08/2018 0.56  0.19 - 1.30 10*3/mm3 Final   • Eosinophils, Absolute 03/08/2018 0.00  0.00 - 0.70 10*3/mm3 Final   • Basophils, Absolute 03/08/2018 0.02  0.00 - 0.20 10*3/mm3 Final       RADIOLOGY:  No results found.         Assessment/Plan  Jay Jones is a 77 y.o. year old male     Patient Active Problem List   Diagnosis   • HLD (hyperlipidemia)   • CAD (coronary artery disease)   • Atrial flutter   • Prostate cancer metastatic to bone      Assessment/Plan 77-year-old man with metastatic prostate cancer mainly bone being treated on Docetaxol plus prednisone with good tolerance.    1.  Prostate cancer: We will continue treatment every 21 days.  This is shown concerning his present management to be made by his M.D. Win physician and were just here for support.  -- We will continue Lupron shot every 6 months.  2.  Bone metastases: I will discuss starting Xgeva next visit.  Continue calcium.    3.  Congestive heart failure: The patient last a call was seen in December.  Continue Diovan, furosemide,    4.  Diabetes mellitus continue metformin.  5.  Anemia: We will check iron panel B12 folate.  6.  Hyperlipidemia  continue Crestor.  7. Anemia: will give a unit of pRBC. With his CHF his goal for Hg transfusion is 8. Also give 20mg of IV lasix after transfusion of blood  8. BLEE: advised pt to start 40mg oral lasix for 3 days then transition to 20mg daily. Also take potassium pill daily. I will check his magnesium.         Ash Ojeda MD    3/8/2018    10:54 AM

## 2018-03-12 PROBLEM — T45.1X5A ANTINEOPLASTIC CHEMOTHERAPY INDUCED ANEMIA: Status: ACTIVE | Noted: 2018-01-01

## 2018-03-12 PROBLEM — D64.81 ANTINEOPLASTIC CHEMOTHERAPY INDUCED ANEMIA: Status: ACTIVE | Noted: 2018-01-01

## 2018-03-12 NOTE — TELEPHONE ENCOUNTER
Called Jay about labs and that his iron saturation is low and Dr. Ojeda has ordered venofer x 8 infusions.  Patient wishes to start Friday at 10:00.  Scheduled with [facility] in outpatient infusion.  Jay requested that Dr. Palmer be notified of this.  Lab results faxed to her at [phone].

## 2018-03-12 NOTE — TELEPHONE ENCOUNTER
----- Message from Ash Ojeda MD sent at 3/9/2018 12:38 PM CST -----  Please call the patient regarding his abnormal result. Need venofer x8

## 2018-03-26 NOTE — PROGRESS NOTES
1030 Pt here for venofer  Infusion. States that he had his 1st one on 3/23/18. Had no problems with adverse reaction while in infusion area. States that on Fri evening he developed pain from waist down both legs to his knees. It was a constant pain. States he had same pain after his 1st chemo infusion. He wondered if this was a reaction to venofer. I told him it did not sound like it, that a reaction usually happens while the venofer is infusing or shortly thereafter. Those symptoms include difficulty breathing, swallowing, rash, itching, flushing, pain in abdomen or back. He did not have any of those symptoms. Notified Isabel Dubois, with Dr Ojeda office. Advised pt to contact MD office if he has those pains again. He verbalized understanding.

## 2018-03-26 NOTE — TELEPHONE ENCOUNTER
Received call from Ani stating that Jay is here for iron today.  Received iron infusions last week.  Patient c/o having leg cramps up to groin over the weekend and was unable to sleep.

## 2018-03-29 NOTE — PATIENT INSTRUCTIONS
Denosumab injection  What is this medicine?  DENOSUMAB (den oh willian mab) slows bone breakdown. Prolia is used to treat osteoporosis in women after menopause and in men. Xgeva is used to treat a high calcium level due to cancer and to prevent bone fractures and other bone problems caused by multiple myeloma or cancer bone metastases. Xgeva is also used to treat giant cell tumor of the bone.  This medicine may be used for other purposes; ask your health care provider or pharmacist if you have questions.  COMMON BRAND NAME(S): Prolia, XGEVA  What should I tell my health care provider before I take this medicine?  They need to know if you have any of these conditions:  -dental disease  -having surgery or tooth extraction  -infection  -kidney disease  -low levels of calcium or Vitamin D in the blood  -malnutrition  -on hemodialysis  -skin conditions or sensitivity  -thyroid or parathyroid disease  -an unusual reaction to denosumab, other medicines, foods, dyes, or preservatives  -pregnant or trying to get pregnant  -breast-feeding  How should I use this medicine?  This medicine is for injection under the skin. It is given by a health care professional in a hospital or clinic setting.  If you are getting Prolia, a special MedGuide will be given to you by the pharmacist with each prescription and refill. Be sure to read this information carefully each time.  For Prolia, talk to your pediatrician regarding the use of this medicine in children. Special care may be needed. For Xgeva, talk to your pediatrician regarding the use of this medicine in children. While this drug may be prescribed for children as young as 13 years for selected conditions, precautions do apply.  Overdosage: If you think you have taken too much of this medicine contact a poison control center or emergency room at once.  NOTE: This medicine is only for you. Do not share this medicine with others.  What if I miss a dose?  It is important not to miss your  dose. Call your doctor or health care professional if you are unable to keep an appointment.  What may interact with this medicine?  Do not take this medicine with any of the following medications:  -other medicines containing denosumab  This medicine may also interact with the following medications:  -medicines that lower your chance of fighting infection  -steroid medicines like prednisone or cortisone  This list may not describe all possible interactions. Give your health care provider a list of all the medicines, herbs, non-prescription drugs, or dietary supplements you use. Also tell them if you smoke, drink alcohol, or use illegal drugs. Some items may interact with your medicine.  What should I watch for while using this medicine?  Visit your doctor or health care professional for regular checks on your progress. Your doctor or health care professional may order blood tests and other tests to see how you are doing.  Call your doctor or health care professional for advice if you get a fever, chills or sore throat, or other symptoms of a cold or flu. Do not treat yourself. This drug may decrease your body's ability to fight infection. Try to avoid being around people who are sick.  You should make sure you get enough calcium and vitamin D while you are taking this medicine, unless your doctor tells you not to. Discuss the foods you eat and the vitamins you take with your health care professional.  See your dentist regularly. Brush and floss your teeth as directed. Before you have any dental work done, tell your dentist you are receiving this medicine.  Do not become pregnant while taking this medicine or for 5 months after stopping it. Talk with your doctor or health care professional about your birth control options while taking this medicine. Women should inform their doctor if they wish to become pregnant or think they might be pregnant. There is a potential for serious side effects to an unborn child. Talk  to your health care professional or pharmacist for more information.  What side effects may I notice from receiving this medicine?  Side effects that you should report to your doctor or health care professional as soon as possible:  -allergic reactions like skin rash, itching or hives, swelling of the face, lips, or tongue  -bone pain  -breathing problems  -dizziness  -jaw pain, especially after dental work  -redness, blistering, peeling of the skin  -signs and symptoms of infection like fever or chills; cough; sore throat; pain or trouble passing urine  -signs of low calcium like fast heartbeat, muscle cramps or muscle pain; pain, tingling, numbness in the hands or feet; seizures  -unusual bleeding or bruising  -unusually weak or tired  Side effects that usually do not require medical attention (report to your doctor or health care professional if they continue or are bothersome):  -constipation  -diarrhea  -headache  -joint pain  -loss of appetite  -muscle pain  -runny nose  -tiredness  -upset stomach  This list may not describe all possible side effects. Call your doctor for medical advice about side effects. You may report side effects to FDA at 1-987-FDA-9154.  Where should I keep my medicine?  This medicine is only given in a clinic, doctor's office, or other health care setting and will not be stored at home.  NOTE: This sheet is a summary. It may not cover all possible information. If you have questions about this medicine, talk to your doctor, pharmacist, or health care provider.  © 2018 [employer] (2018-01-09 19:17:21)

## 2018-03-29 NOTE — PROGRESS NOTES
[facility]  HEMATOLOGY & ONCOLOGY    Cancer Staging Information:  No matching staging information was found for the patient.      Subjective     VISIT DIAGNOSIS:   Encounter Diagnosis   Name Primary?   • Prostate cancer metastatic to bone        REASON FOR VISIT:     Chief Complaint   Patient presents with   • prostate cancer metastatic     f/u        HEMATOLOGY / ONCOLOGY HISTORY:   Oncology/Hematology History    7 1013 surgical resection on our ALP, Leon 9, PTT 3 BN 1.  PSA increase increased from 0.9 December 2013 to 4.76 June 2014 to 11.29 September 2014.  Bone scan September 2014 show uptake in the right scapula.  Lupron started October 2014 to present.  This is  October 2014 for one month.  PSA undetectable until March 2016.  07/03/2016 PSA 2.6.  Casodex restarted 07/15/2016.  CT and bone scan 08/15/2016 showed metastasis in the left hemipelvis.  Left scapula, lymphadenopathy and presacral and perineural to area.  DEXA showed osteoporotic February 2017 completed SipurucelT.  November 2016 started enzalutamide discontinued August 2017.  Started arbiraterone and prednisone 08/18/2017.  Bone scan 09/19/17. Extensive skeletal metastases with progression  - CT scan 09/19/17. Progression of osseous metastatic disease with a destructive lesion involving the left iliac wing. Mildly prominent perirectalnodes.   - 10/12/2017. Started Radium 223, received one additional dose on 11/09/2017. Sought second opinion with [facility] (Dr. Ryanne Palmer) due to clinical disease progression.   -12/14/2017. Started Docetaxel with Dr. Palmer of [facility]. Received #2 on 01/04/2018.   -January 25 218 receives he is status post cycle 4 of docetaxel plus prednisone at [facility] and admitted several Dr. Adames.        Prostate cancer metastatic to bone    2/6/2018 Initial Diagnosis     Prostate cancer metastatic to bone              INTERVAL HISTORY  Patient ID: Jay Jones is a 77 y.o. year old male  Jay Jones is a  77 y.o. year old male presentation to continue chemotherapy for his metastatic prostate cancer.  He complains of excessive fatigue after his chemotherapy.  He does have occasional left hip pain, left femur pain, and right scapula pain.  His last Lupron shot was in January 25 at [place].  to be done every 6 months.  He does have bilateral lower estimated edema which is chronic.  This is secondary to his congestive heart failure.  He also has a history of bypass.  He takes Lasix and it will go down but he does not like to take Lasix all the time.  - had palpitation. His cardiologist will give him ambulatory monitor  -- tires the week after tx. S/p blood transfusion 3 weeks ago. Getting venofer.  Past Medical History:   Past Medical History:   Diagnosis Date   • Asthma     ASTHMA NOS W/ACUTE EXACERBATION   • CAD in native artery    • Cancer    • Coronary artery disease    • HLD (hyperlipidemia)     Hyperlipidemia, unspecified   • Infectious viral hepatitis     HEPATITIS A, VIRAL, W/O HEPATIC COMA   • Prostate CA 2/6/2018   • Prostate cancer metastatic to bone 2/6/2018   • Unspecified atrial flutter      Past Surgical History:   Past Surgical History:   Procedure Laterality Date   • APPENDECTOMY     • CARDIAC CATHETERIZATION Left 11/01/2006    -with findings of three-vessel coronary artery disease with normal left ventricular function.   • CHOLECYSTECTOMY     • CORONARY ARTERY BYPASS GRAFT  11/03/2006    CABG times five with LIMA grafted in a sequential fashion to the first diagonal and left anterior descending followed by a saphenous vein graft to the first and second obtuse marginal branches and then an individual saphenous vein graft to the right coronary artery.   • OTHER SURGICAL HISTORY  02/28/2007    electrocardioversion   • PROSTATECTOMY       Social History:   Social History     Social History   • Marital status:      Spouse name: N/A   • Number of children: N/A   • Years of education: N/A      Occupational History   • Not on file.     Social History Main Topics   • Smoking status: Light Tobacco Smoker     Types: Cigars   • Smokeless tobacco: Never Used   • Alcohol use No   • Drug use: No   • Sexual activity: Defer     Other Topics Concern   • Not on file     Social History Narrative   • No narrative on file     Family History:   Family History   Problem Relation Age of Onset   • Heart disease Father    • Heart attack Father    • Heart failure Father    • Heart disease Maternal Grandfather    • Alzheimer's disease Mother    • Heart disease Other        Review of Systems   Constitutional: Negative.    HENT: Negative.    Eyes: Negative.    Respiratory: Negative.    Cardiovascular: Positive for palpitations.   Gastrointestinal: Negative.    Genitourinary: Negative.    Musculoskeletal:        Micha latonya    Skin: Negative.    Neurological: Negative.    Hematological: Negative.    Psychiatric/Behavioral: Negative.         Performance Status:  Symptomatic; fully ambulatory    Medications:    Current Outpatient Prescriptions   Medication Sig Dispense Refill   • aspirin 81 MG EC tablet Take 81 mg by mouth Daily.     • CALCIUM PO Take  by mouth.     • celecoxib (CeleBREX) 100 MG capsule Take 100 mg by mouth 2 (Two) Times a Day As Needed for Mild Pain .     • furosemide (LASIX) 40 MG tablet TAKE 1 TABLET BY MOUTH AS NEEDED EDEMA  2   • KLOR-CON 10 MEQ CR tablet TAKE 1 TABLET DAILY  6   • metFORMIN XR (GLUCOPHAGE-XR) 500 MG 24 hr tablet TAKE 1 TABLET TWICE A DAY  3   • montelukast (SINGULAIR) 10 MG tablet Take 10 mg by mouth Every Night.     • predniSONE (DELTASONE) 5 MG tablet Take 5 mg by mouth 2 (Two) Times a Day.     • rosuvastatin (CRESTOR) 40 MG tablet TAKE 1 TABLET BY MOUTH EVERY NIGHT AT BEDTIME  6   • sennosides-docusate sodium (SENOKOT-S) 8.6-50 MG tablet Take 1 tablet by mouth 4 (Four) Times a Day.     • SYMBICORT 160-4.5 MCG/ACT inhaler USE 2 PUFFS DAILY  7   • valsartan (DIOVAN) 40 MG tablet TAKE 1  "TABLET EVERY DAY  3     No current facility-administered medications for this visit.        ALLERGIES:    Allergies   Allergen Reactions   • Oxycodone-Acetaminophen Unknown (See Comments)     Reaction: hallucinations   • Codeine Unknown (See Comments) and GI Intolerance   • Percocet [Oxycodone-Acetaminophen]    • Tramadol Other (See Comments)     Increased BP and HR       Objective      Vitals:    03/29/18 1033   BP: 126/84   Pulse: 84   Resp: 16   Temp: 97.5 °F (36.4 °C)   TempSrc: Tympanic   SpO2: 96%   Weight: 68.5 kg (151 lb)   Height: 177.8 cm (70\")         Current Status 3/29/2018   ECOG score 0         Physical Exam    General Appearance: Pale and frail appearing. Patient is awake, alert, oriented and in no acute distress. Patient is welldeveloped, wellnourished, and appears stated age.  HEENT: Normocephalic. Sclerae clear, conjunctiva pink, extraocular movements intact, pupils, round, reactive to light and  accommodation. Mouth and throat are clear with moist oral mucosa.  NECK: Supple, no jugular venous distention, thyroid not enlarged.  LYMPH: No cervical, supraclavicular, axillary, or inguinal lymphadenopathy.  CHEST: Equal bilateral expansion, AP  diameter normal, resonant percussion note  LUNGS: Good air movement, no rales, rhonchi, rubs or wheezes with auscultation  CARDIO: Regular sinus rhythm, no murmurs, gallops or rubs.  ABDOMEN: Nondistended, soft, No tenderness, no guarding, no rebound, No hepatosplenomegaly. No abdominal masses. Bowel sounds positive. No hernia  GENITALIA: Not examined.  BREASTS: Not examined.  MUSKEL: No joint swelling, decreased motion, or inflammation  EXTREMS: nhan LE edema, No clubbing, cyanosis, No varicose veins.  NEURO: Grossly nonfocal, Gait is coordinated and smooth, Cognition is preserved.  SKIN: No rashes, no ecchymoses, no petechia.  PSYCH: Oriented to time, place and person. Memory is preserved. Mood and affect appear normal  RECENT LABS:  Lab on 03/29/2018 "   Component Date Value Ref Range Status   • Glucose 03/29/2018 97  70 - 100 mg/dL Final   • BUN 03/29/2018 15  5 - 21 mg/dL Final   • Creatinine 03/29/2018 0.61  0.50 - 1.40 mg/dL Final   • Sodium 03/29/2018 137  135 - 145 mmol/L Final   • Potassium 03/29/2018 3.3* 3.5 - 5.3 mmol/L Final   • Chloride 03/29/2018 98  98 - 110 mmol/L Final   • CO2 03/29/2018 33.0* 24.0 - 31.0 mmol/L Final   • Calcium 03/29/2018 8.6  8.4 - 10.4 mg/dL Final   • Total Protein 03/29/2018 6.1* 6.3 - 8.7 g/dL Final   • Albumin 03/29/2018 3.20* 3.50 - 5.00 g/dL Final   • ALT (SGPT) 03/29/2018 18  0 - 54 U/L Final   • AST (SGOT) 03/29/2018 49* 7 - 45 U/L Final   • Alkaline Phosphatase 03/29/2018 389* 24 - 120 U/L Final   • Total Bilirubin 03/29/2018 0.3  0.1 - 1.0 mg/dL Final   • eGFR Non African Amer 03/29/2018 129  >60 mL/min/1.73 Final   • Globulin 03/29/2018 2.9  gm/dL Final   • A/G Ratio 03/29/2018 1.1  1.1 - 2.5 g/dL Final   • BUN/Creatinine Ratio 03/29/2018 24.6  7.0 - 25.0 Final   • Anion Gap 03/29/2018 6.0  4.0 - 13.0 mmol/L Final   • WBC 03/29/2018 6.86  4.80 - 10.80 10*3/mm3 Preliminary   • RBC 03/29/2018 2.74* 4.80 - 5.90 10*6/mm3 Preliminary   • Hemoglobin 03/29/2018 8.0* 14.0 - 18.0 g/dL Preliminary   • Hematocrit 03/29/2018 25.9* 40.0 - 52.0 % Preliminary   • MCV 03/29/2018 94.5  82.0 - 95.0 fL Preliminary   • MCH 03/29/2018 29.2  28.0 - 32.0 pg Preliminary   • MCHC 03/29/2018 30.9* 33.0 - 36.0 g/dL Preliminary   • RDW 03/29/2018 19.6* 12.0 - 15.0 % Preliminary   • RDW-SD 03/29/2018 67.9* 40.0 - 54.0 fl Preliminary   • MPV 03/29/2018 9.0  6.0 - 12.0 fL Preliminary   • Platelets 03/29/2018 132  130 - 400 10*3/mm3 Preliminary   • nRBC 03/29/2018 2.8* 0.0 - 0.0 /100 WBC Preliminary       RADIOLOGY:  No results found.         Assessment/Plan  Jay Jones is a 77 y.o. year old male     Patient Active Problem List   Diagnosis   • HLD (hyperlipidemia)   • CAD (coronary artery disease)   • Atrial flutter   • Prostate cancer metastatic to  bone   • Antineoplastic chemotherapy induced anemia      Assessment/Plan 77-year-old man with metastatic prostate cancer mainly bone being treated on Docetaxol plus prednisone with good tolerance.    1.  Prostate cancer: We will continue treatment every 21 days.  This is shown concerning his present management to be made by his M.D. Win physician and were just here for support.  -- We will continue Lupron shot every 6 months.  2.  Bone metastases: will start xgeva.  Continue calcium.    3.  Congestive heart failure:  Continue Diovan, furosemide. Careful with too much water.     4.  Diabetes mellitus continue metformin.  5.  Anemia: We will check iron panel B12 folate.  6.  Hyperlipidemia continue Crestor.  7. Anemia: will give a unit of pRBC. With his CHF his goal for Hg transfusion is 8. Also give 20mg of IV lasix after transfusion of blood  8. BLEE: advised pt to start 40mg oral lasix for 3 days then transition to 20mg daily. Also take potassium pill daily. I will check his magnesium.   8. Sinus congestion/cough: no fever or chills. Advised claritin and mucinex.        Ash Ojeda MD    3/29/2018    11:28 AM

## 2018-03-29 NOTE — PROGRESS NOTES
03- 1215 Xgeva injection discussed with patient prior to injections, questions answered. Xgeva teaching sheet in with AVS.Arun TONG      03- 1522 Patient called and stated that when he got up this am thought he had a stroke, his jaw was numb from the middle of his face over to the side. He was wondering if we could have hit a nerve with the IV causing the sensation or if it was the medication he received yesterday. Instucted patient that IV located in his arm would not cause that sensation in his face, when starting the IV if a nerve was hit would have felt a sensation in the arm near the IV stick sight when the IV was being started. Also the medications that were given the sensation the patient is having is not one of the common side effects of the drugs.The patient said he would call his doctor on Monday. Instructed that he needed to call his MD today or go to urgent care/ER to be evaluated today.Patient states understanding. Arun TONG

## 2018-04-09 NOTE — PROGRESS NOTES
Unable to gain IV access.  Patient stuck 8 times w/o success.  Patient prefers to return to clinic later.  No interventions today and no charges appropriate.  DYLON Brown RN

## 2018-04-19 NOTE — PROGRESS NOTES
[facility]  HEMATOLOGY & ONCOLOGY    Cancer Staging Information:  No matching staging information was found for the patient.      Subjective     VISIT DIAGNOSIS:   Encounter Diagnosis   Name Primary?   • Prostate cancer metastatic to bone        REASON FOR VISIT:     Chief Complaint   Patient presents with   • Outpatient Infusion     Met Prostate Ca: He is here for consideration of his txt today, he is in w/c, c/o extreme weakness and fatigue and he did get down in parking lot and needed assistance to car last Sunday, loss of appetite down 4 lbs from previous visit. He appears very pale today. Continues to have swelling of lower extremities with 3+-4+ pitting edema, does have lasix and K+ and takes on occasion.     • Jaw Pain     He c/o right side jaw pain and discomfort with numbness, which comes and goes, is better today, he has had one injection of Xgeva, reported to Dr Welch        HEMATOLOGY / ONCOLOGY HISTORY:   Oncology/Hematology History    7 1013 surgical resection on our ALP, [place] 9, PTT 3 BN 1.  PSA increase increased from 0.9 December 2013 to 4.76 June 2014 to 11.29 September 2014.  Bone scan September 2014 show uptake in the right scapula.  Lupron started October 2014 to present.  This is  October 2014 for one month.  PSA undetectable until March 2016.  07/03/2016 PSA 2.6.  Casodex restarted 07/15/2016.  CT and bone scan 08/15/2016 showed metastasis in the left hemipelvis.  Left scapula, lymphadenopathy and presacral and perineural to area.  DEXA showed osteoporotic February 2017 completed SipurucelT.  November 2016 started enzalutamide discontinued August 2017.  Started arbiraterone and prednisone 08/18/2017.  Bone scan 09/19/17. Extensive skeletal metastases with progression  - CT scan 09/19/17. Progression of osseous metastatic disease with a destructive lesion involving the left iliac wing. Mildly prominent perirectalnodes.   - 10/12/2017. Started Radium 223, received one  additional dose on 11/09/2017. Sought second opinion with [facility] (Dr. Ryanne Palmer) due to clinical disease progression.   -12/14/2017. Started Docetaxel with Dr. Palmer of [facility]. Received #2 on 01/04/2018.   -January 25 218 receives he is status post cycle 4 of docetaxel plus prednisone at [facility] and admitted several Dr. Adames.        Prostate cancer metastatic to bone    2/6/2018 Initial Diagnosis     Prostate cancer metastatic to bone              INTERVAL HISTORY  Patient ID: Jay Jones is a 77 y.o. year old male  Jay Jones is a 77 y.o. year old male presentation to continue chemotherapy for his metastatic prostate cancer.  He complains of excessive fatigue after his chemotherapy.  He does have occasional left hip pain, left femur pain, and right scapula pain.  His last Lupron shot was in January 25 at [place].  to be done every 6 months.  He does have bilateral lower estimated edema which is chronic.  This is secondary to his congestive heart failure.  He also has a history of bypass.  He takes Lasix and it will go down but he does not like to take Lasix all the time.  - had palpitation. His cardiologist will give him ambulatory monitor  -- tires the week after tx. S/p blood transfusion 3 weeks ago. Getting venofer.  Past Medical History:   Past Medical History:   Diagnosis Date   • Asthma     ASTHMA NOS W/ACUTE EXACERBATION   • CAD in native artery    • Cancer    • Coronary artery disease    • HLD (hyperlipidemia)     Hyperlipidemia, unspecified   • Infectious viral hepatitis     HEPATITIS A, VIRAL, W/O HEPATIC COMA   • Prostate CA 2/6/2018   • Prostate cancer metastatic to bone 2/6/2018   • Unspecified atrial flutter      Past Surgical History:   Past Surgical History:   Procedure Laterality Date   • APPENDECTOMY     • CARDIAC CATHETERIZATION Left 11/01/2006    -with findings of three-vessel coronary artery disease with normal left ventricular function.   • CHOLECYSTECTOMY     • CORONARY ARTERY  BYPASS GRAFT  11/03/2006    CABG times five with LIMA grafted in a sequential fashion to the first diagonal and left anterior descending followed by a saphenous vein graft to the first and second obtuse marginal branches and then an individual saphenous vein graft to the right coronary artery.   • OTHER SURGICAL HISTORY  02/28/2007    electrocardioversion   • PROSTATECTOMY       Social History:   Social History     Social History   • Marital status:      Spouse name: N/A   • Number of children: N/A   • Years of education: N/A     Occupational History   • Not on file.     Social History Main Topics   • Smoking status: Light Tobacco Smoker     Types: Cigars   • Smokeless tobacco: Never Used   • Alcohol use No   • Drug use: No   • Sexual activity: Defer     Other Topics Concern   • Not on file     Social History Narrative   • No narrative on file     Family History:   Family History   Problem Relation Age of Onset   • Heart disease Father    • Heart attack Father    • Heart failure Father    • Heart disease Maternal Grandfather    • Alzheimer's disease Mother    • Heart disease Other        Review of Systems   Constitutional: Negative.    HENT: Negative.    Eyes: Negative.    Respiratory: Negative.    Cardiovascular: Positive for palpitations.   Gastrointestinal: Negative.    Genitourinary: Negative.    Musculoskeletal:        Micha latonya    Skin: Negative.    Neurological: Negative.    Hematological: Negative.    Psychiatric/Behavioral: Negative.         Performance Status:  Symptomatic; fully ambulatory    Medications:    Current Outpatient Prescriptions   Medication Sig Dispense Refill   • aspirin 81 MG EC tablet Take 81 mg by mouth Daily.     • CALCIUM PO Take  by mouth.     • celecoxib (CeleBREX) 100 MG capsule Take 100 mg by mouth 2 (Two) Times a Day As Needed for Mild Pain .     • furosemide (LASIX) 40 MG tablet TAKE 1 TABLET BY MOUTH AS NEEDED EDEMA  2   • KLOR-CON 10 MEQ CR tablet TAKE 1 TABLET DAILY  6   •  "metFORMIN XR (GLUCOPHAGE-XR) 500 MG 24 hr tablet TAKE 1 TABLET TWICE A DAY  3   • montelukast (SINGULAIR) 10 MG tablet Take 10 mg by mouth Every Night.     • predniSONE (DELTASONE) 5 MG tablet Take 5 mg by mouth 2 (Two) Times a Day.     • sennosides-docusate sodium (SENOKOT-S) 8.6-50 MG tablet Take 1 tablet by mouth 4 (Four) Times a Day.     • SYMBICORT 160-4.5 MCG/ACT inhaler USE 2 PUFFS DAILY  7   • valsartan (DIOVAN) 40 MG tablet TAKE 1 TABLET EVERY DAY  3   • rosuvastatin (CRESTOR) 40 MG tablet TAKE 1 TABLET BY MOUTH EVERY NIGHT AT BEDTIME  6     No current facility-administered medications for this visit.        ALLERGIES:    Allergies   Allergen Reactions   • Oxycodone-Acetaminophen Unknown (See Comments)     Reaction: hallucinations   • Codeine Unknown (See Comments) and GI Intolerance   • Percocet [Oxycodone-Acetaminophen]    • Tramadol Other (See Comments)     Increased BP and HR       Objective      Vitals:    04/19/18 0923   BP: 94/60   Pulse: 70   Resp: 18   Temp: 98 °F (36.7 °C)   TempSrc: Tympanic   Weight: 66.9 kg (147 lb 8 oz)   Height: 177.8 cm (70\")         Current Status 4/19/2018   ECOG score 3         Physical Exam  General Appearance: Pale and frail appearing. Patient is awake, alert, oriented and in no acute distress. Patient is welldeveloped, wellnourished, and appears stated age.  HEENT: Normocephalic. Sclerae clear, conjunctiva pink, extraocular movements intact, pupils, round, reactive to light and  accommodation. Mouth and throat are clear with moist oral mucosa.  NECK: Supple, no jugular venous distention, thyroid not enlarged.  LYMPH: No cervical, supraclavicular, axillary, or inguinal lymphadenopathy.  CHEST: Equal bilateral expansion, AP  diameter normal, resonant percussion note  LUNGS: Good air movement, no rales, rhonchi, rubs or wheezes with auscultation  CARDIO: Regular sinus rhythm, no murmurs, gallops or rubs.  ABDOMEN: Nondistended, soft, No tenderness, no guarding, no " rebound, No hepatosplenomegaly. No abdominal masses. Bowel sounds positive. No hernia  GENITALIA: Not examined.  BREASTS: Not examined.  MUSKEL: No joint swelling, decreased motion, or inflammation  EXTREMS: nhan LE edema, No clubbing, cyanosis, No varicose veins.  NEURO: Grossly nonfocal, Gait is coordinated and smooth, Cognition is preserved.  SKIN: No rashes, no ecchymoses, no petechia.  PSYCH: Oriented to time, place and person. Memory is preserved. Mood and affect appear normal  RECENT LABS:  Lab on 04/19/2018   Component Date Value Ref Range Status   • Glucose 04/19/2018 154* 70 - 100 mg/dL Final   • BUN 04/19/2018 29* 5 - 21 mg/dL Final   • Creatinine 04/19/2018 0.69  0.50 - 1.40 mg/dL Final   • Sodium 04/19/2018 129* 135 - 145 mmol/L Final   • Potassium 04/19/2018 3.9  3.5 - 5.3 mmol/L Final   • Chloride 04/19/2018 95* 98 - 110 mmol/L Final   • CO2 04/19/2018 26.0  24.0 - 31.0 mmol/L Final   • Calcium 04/19/2018 7.4* 8.4 - 10.4 mg/dL Final   • Total Protein 04/19/2018 5.8* 6.3 - 8.7 g/dL Final   • Albumin 04/19/2018 3.00* 3.50 - 5.00 g/dL Final   • ALT (SGPT) 04/19/2018 28  0 - 54 U/L Final   • AST (SGOT) 04/19/2018 212* 7 - 45 U/L Final   • Alkaline Phosphatase 04/19/2018 483* 24 - 120 U/L Final   • Total Bilirubin 04/19/2018 0.7  0.1 - 1.0 mg/dL Final   • eGFR Non African Amer 04/19/2018 112  >60 mL/min/1.73 Final   • Globulin 04/19/2018 2.8  gm/dL Final   • A/G Ratio 04/19/2018 1.1  1.1 - 2.5 g/dL Final   • BUN/Creatinine Ratio 04/19/2018 42.0* 7.0 - 25.0 Final   • Anion Gap 04/19/2018 8.0  4.0 - 13.0 mmol/L Final   • WBC 04/19/2018 7.68  4.80 - 10.80 10*3/mm3 Final   • RBC 04/19/2018 2.79* 4.80 - 5.90 10*6/mm3 Final   • Hemoglobin 04/19/2018 8.5* 14.0 - 18.0 g/dL Final   • Hematocrit 04/19/2018 26.6* 40.0 - 52.0 % Final   • MCV 04/19/2018 95.3* 82.0 - 95.0 fL Final   • MCH 04/19/2018 30.5  28.0 - 32.0 pg Final   • MCHC 04/19/2018 32.0* 33.0 - 36.0 g/dL Final   • RDW 04/19/2018 21.2* 12.0 - 15.0 % Final    • RDW-SD 04/19/2018 74.2* 40.0 - 54.0 fl Final   • MPV 04/19/2018 9.3  6.0 - 12.0 fL Final   • Platelets 04/19/2018 121* 130 - 400 10*3/mm3 Final   • Neutrophil % 04/19/2018 76.4  39.0 - 78.0 % Final   • Lymphocyte % 04/19/2018 9.4* 15.0 - 45.0 % Final   • Monocyte % 04/19/2018 10.3  4.0 - 12.0 % Final   • Eosinophil % 04/19/2018 0.0  0.0 - 4.0 % Final   • Basophil % 04/19/2018 0.3  0.0 - 2.0 % Final   • Neutrophils, Absolute 04/19/2018 5.87  1.87 - 8.40 10*3/mm3 Final   • Lymphocytes, Absolute 04/19/2018 0.72  0.72 - 4.86 10*3/mm3 Final   • Monocytes, Absolute 04/19/2018 0.79  0.19 - 1.30 10*3/mm3 Final   • Eosinophils, Absolute 04/19/2018 0.00  0.00 - 0.70 10*3/mm3 Final   • Basophils, Absolute 04/19/2018 0.02  0.00 - 0.20 10*3/mm3 Final       RADIOLOGY:  No results found.         Assessment/Plan  Jay Jones is a 77 y.o. year old male     Patient Active Problem List   Diagnosis   • HLD (hyperlipidemia)   • CAD (coronary artery disease)   • Atrial flutter   • Prostate cancer metastatic to bone   • Antineoplastic chemotherapy induced anemia      Assessment/Plan 77-year-old man with metastatic prostate cancer mainly bone being treated on Docetaxol plus prednisone with good tolerance.    1.  Prostate cancer: We will continue treatment every 21 days.  This is shown concerning his present management to be made by his [facility] physician and were just here for support.    More rently after 4 doses of Taxotere om March 29th 2018 his PSA colby tp 1050. Considering this he is NOT rersponding to Taxotere. He is due for another dose of taxotere today, but he has an appointment with [facility] this coming Tuesday., I rather wait and let the [facility] decide the next plan treatment. Hold off Taxotere today, but Iron infusion only.   -- We will continue Lupron shot every 6 months.  2.  Bone metastases: will start xgeva.  Continue calcium.    3.  Congestive heart failure:  Continue Diovan, furosemide. Careful  with too much water.     4.  Diabetes mellitus continue metformin.  5.  Anemia: We will check iron panel B12 folate.  6.  Hyperlipidemia continue Crestor.  7. Anemia: will give a unit of pRBC. With his CHF his goal for Hg transfusion is 8. Also give 20mg of IV lasix after transfusion of blood  8. BLEE: advised pt to start 40mg oral lasix for 3 days then transition to 20mg daily. Also take potassium pill daily. I will check his magnesium.   8. Sinus congestion/cough: no fever or chills. Advised claritin and mucinex.        Gregory Welch MD    4/19/2018    10:13 AM

## 2018-04-19 NOTE — PROGRESS NOTES
[id number]  Called Dr. Welch's office regarding CMP results. S/W Sherly Amaya RN holding Taxotere today but proceeding with Venofer. Patient to go to [facility] next week for re-evaluation.Arun TONG

## 2018-04-23 NOTE — TELEPHONE ENCOUNTER
Returned call to Audrey concerning setting Jay up for a blood transfusion.  Audrey stated Dr. BELTRÁN at [facility] wanted him to get a blood transfusion and not to come for his appointment at this time.  Audrey requested that we call Dr. BELTRÁN at [facility] to see what scans she wants done.  Called Dr. Palmer and left message for her to call our office.  Dr. Ojeda gave verbal order for Jay to get one unit PRBC with lasix after.  Scheduled with outpatient scheduling to get unit of blood on 4/25/18 at 0800.  Notified Audrey of what time Jay is to be at the infusion center.  Verbal order from Dr. Ojeda to do CT chest abdomen and pelvis, and a bone scan and that she wants to see him next week.

## 2018-04-25 NOTE — PROGRESS NOTES
7545 Called and s/w Sherly Amaya RN with Dr. Welch and Rusty patient requesting Tylenol for shoulder and back pain. Order received for Tylenol 650 mg po x 1.Arun TONG    9573 Patient scheduled for xgeva tomorrow CMP on 03/18/18 shown calcium of 7.9, s/w Sherly Amaya RN with Dr. Ojeda.. Hold xgeva tomorrow patient to be scheduled to see Dr. Ojeda next week, office to call patient regarding appt. Patient aware, verbalized understanding.Arun    1917 Blood post care instructions with AVS.Arun TONG

## 2018-04-25 NOTE — PATIENT INSTRUCTIONS
Blood Transfusion, Care After  This sheet gives you information about how to care for yourself after your procedure. Your doctor may also give you more specific instructions. If you have problems or questions, contact your doctor.  Follow these instructions at home:  · Take over-the-counter and prescription medicines only as told by your doctor.  · Go back to your normal activities as told by your doctor.  · Follow instructions from your doctor about how to take care of the area where an IV tube was put into your vein (insertion site). Make sure you:  ¨ Wash your hands with soap and water before you change your bandage (dressing). If there is no soap and water, use hand .  ¨ Change your bandage as told by your doctor.  · Check your IV insertion site every day for signs of infection. Check for:  ¨ More redness, swelling, or pain.  ¨ More fluid or blood.  ¨ Warmth.  ¨ Pus or a bad smell.  Contact a doctor if:  · You have more redness, swelling, or pain around the IV insertion site..  · You have more fluid or blood coming from the IV insertion site.  · Your IV insertion site feels warm to the touch.  · You have pus or a bad smell coming from the IV insertion site.  · Your pee (urine) turns pink, red, or brown.  · You feel weak after doing your normal activities.  Get help right away if:  · You have signs of a serious allergic or body defense (immune) system reaction, including:  ¨ Itchiness.  ¨ Hives.  ¨ Trouble breathing.  ¨ Anxiety.  ¨ Pain in your chest or lower back.  ¨ Fever, flushing, and chills.  ¨ Fast pulse.  ¨ Rash.  ¨ Watery poop (diarrhea).  ¨ Throwing up (vomiting).  ¨ Dark pee.  ¨ Serious headache.  ¨ Dizziness.  ¨ Stiff neck.  ¨ Yellow color in your face or the white parts of your eyes (jaundice).  Summary  · After a blood transfusion, return to your normal activities as told by your doctor.  · Every day, check for signs of infection where the IV tube was put into your vein.  · Some signs of  infection are warm skin, more redness and pain, more fluid or blood, and pus or a bad smell where the needle went in.  · Contact your doctor if you feel weak or have any unusual symptoms.  This information is not intended to replace advice given to you by your health care provider. Make sure you discuss any questions you have with your health care provider.  Document Released: 01/08/2016 Document Revised: 08/11/2017 Document Reviewed: 08/11/2017  [email or web address] Interactive Patient Education © 2017 [employer]

## 2018-05-10 PROBLEM — R60.1 GENERALIZED EDEMA: Status: ACTIVE | Noted: 2018-01-01

## 2018-05-10 NOTE — PROGRESS NOTES
1969 Casie with lab called to report 1 blast, sending to pathology for review. Called Irene Jerez LPN with Dr. Ojeda, to inform MD.-T.Pritchett TONG

## 2018-05-11 NOTE — PROGRESS NOTES
[facility]  HEMATOLOGY & ONCOLOGY    Cancer Staging Information:  No matching staging information was found for the patient.      Subjective     VISIT DIAGNOSIS:   Encounter Diagnoses   Name Primary?   • Prostate cancer metastatic to bone    • Generalized edema        REASON FOR VISIT:     No chief complaint on file.       HEMATOLOGY / ONCOLOGY HISTORY:   Oncology/Hematology History    7 1013 surgical resection on our ALP, [place] 9, PTT 3 BN 1.  PSA increase increased from 0.9 December 2013 to 4.76 June 2014 to 11.29 September 2014.  Bone scan September 2014 show uptake in the right scapula.  Lupron started October 2014 to present.  This is  October 2014 for one month.  PSA undetectable until March 2016.  07/03/2016 PSA 2.6.  Casodex restarted 07/15/2016.  CT and bone scan 08/15/2016 showed metastasis in the left hemipelvis.  Left scapula, lymphadenopathy and presacral and perineural to area.  DEXA showed osteoporotic February 2017 completed SipurucelT.  November 2016 started enzalutamide discontinued August 2017.  Started arbiraterone and prednisone 08/18/2017.  Bone scan 09/19/17. Extensive skeletal metastases with progression  - CT scan 09/19/17. Progression of osseous metastatic disease with a destructive lesion involving the left iliac wing. Mildly prominent perirectalnodes.   - 10/12/2017. Started Radium 223, received one additional dose on 11/09/2017. Sought second opinion with [facility] (Dr. Ryanne Palmer) due to clinical disease progression.   -12/14/2017. Started Docetaxel with Dr. Palmer of [facility]. Received #2 on 01/04/2018.   -January 25 218 receives he is status post cycle 4 of docetaxel plus prednisone at [facility] and admitted several Dr. Adames.        Prostate cancer metastatic to bone    2/6/2018 Initial Diagnosis     Prostate cancer metastatic to bone              INTERVAL HISTORY  Patient ID: Jay Jones is a 77 y.o. year old male  Jay Jones is a 77 y.o. year old male  presentation to continue chemotherapy for his metastatic prostate cancer.  He complains of excessive fatigue after his chemotherapy.  He does have occasional left hip pain, left femur pain, and right scapula pain.  His last Lupron shot was in January 25 at [place].  to be done every 6 months.  He does have bilateral lower estimated edema which is chronic.  This is secondary to his congestive heart failure.  He also has a history of bypass.  He takes Lasix and it will go down but he does not like to take Lasix all the time.  - had palpitation. His cardiologist will give him ambulatory monitor  -- tires the week after tx. S/p blood transfusion 3 weeks ago. Getting venofer.  Past Medical History:   Past Medical History:   Diagnosis Date   • Asthma     ASTHMA NOS W/ACUTE EXACERBATION   • CAD in native artery    • Cancer    • Coronary artery disease    • HLD (hyperlipidemia)     Hyperlipidemia, unspecified   • Infectious viral hepatitis     HEPATITIS A, VIRAL, W/O HEPATIC COMA   • Prostate CA 2/6/2018   • Prostate cancer metastatic to bone 2/6/2018   • Unspecified atrial flutter      Past Surgical History:   Past Surgical History:   Procedure Laterality Date   • APPENDECTOMY     • CARDIAC CATHETERIZATION Left 11/01/2006    -with findings of three-vessel coronary artery disease with normal left ventricular function.   • CHOLECYSTECTOMY     • CORONARY ARTERY BYPASS GRAFT  11/03/2006    CABG times five with LIMA grafted in a sequential fashion to the first diagonal and left anterior descending followed by a saphenous vein graft to the first and second obtuse marginal branches and then an individual saphenous vein graft to the right coronary artery.   • OTHER SURGICAL HISTORY  02/28/2007    electrocardioversion   • PROSTATECTOMY       Social History:   Social History     Social History   • Marital status:      Spouse name: N/A   • Number of children: N/A   • Years of education: N/A     Occupational History   • Not  on file.     Social History Main Topics   • Smoking status: Light Tobacco Smoker     Types: Cigars   • Smokeless tobacco: Never Used   • Alcohol use No   • Drug use: No   • Sexual activity: Defer     Other Topics Concern   • Not on file     Social History Narrative   • No narrative on file     Family History:   Family History   Problem Relation Age of Onset   • Heart disease Father    • Heart attack Father    • Heart failure Father    • Heart disease Maternal Grandfather    • Alzheimer's disease Mother    • Heart disease Other        Review of Systems   Constitutional: Negative.    HENT: Negative.    Eyes: Negative.    Respiratory: Negative.    Cardiovascular: Positive for palpitations.   Gastrointestinal: Negative.    Genitourinary: Negative.    Musculoskeletal:        Micha latonya    Skin: Negative.    Neurological: Negative.    Hematological: Negative.    Psychiatric/Behavioral: Negative.         Performance Status:  Symptomatic; fully ambulatory    Medications:    Current Outpatient Prescriptions   Medication Sig Dispense Refill   • aspirin 81 MG EC tablet Take 81 mg by mouth Daily.     • CALCIUM PO Take  by mouth.     • furosemide (LASIX) 40 MG tablet TAKE 1 TABLET BY MOUTH x 5 days, then 1/2 tab daily  2   • KLOR-CON 10 MEQ CR tablet TAKE 1 TABLET DAILY  6   • metFORMIN XR (GLUCOPHAGE-XR) 500 MG 24 hr tablet TAKE 1 TABLET TWICE A DAY  3   • predniSONE (DELTASONE) 5 MG tablet Take 5 mg by mouth 2 (Two) Times a Day.     • rosuvastatin (CRESTOR) 40 MG tablet TAKE 1 TABLET BY MOUTH EVERY NIGHT AT BEDTIME  6   • sennosides-docusate sodium (SENOKOT-S) 8.6-50 MG tablet Take 1 tablet by mouth 4 (Four) Times a Day.     • valsartan (DIOVAN) 40 MG tablet TAKE 1 TABLET EVERY DAY  3   • celecoxib (CeleBREX) 100 MG capsule Take 100 mg by mouth 2 (Two) Times a Day As Needed for Mild Pain .     • estrogens, conjugated, (PREMARIN) 0.625 MG tablet Take 1 tablet by mouth Daily for 30 days. Take daily for 21 days then do not take for  "7 days. 30 tablet 1   • montelukast (SINGULAIR) 10 MG tablet Take 10 mg by mouth Every Night.     • SYMBICORT 160-4.5 MCG/ACT inhaler USE 2 PUFFS DAILY  7     No current facility-administered medications for this visit.        ALLERGIES:    Allergies   Allergen Reactions   • Oxycodone-Acetaminophen Unknown (See Comments)     Reaction: hallucinations   • Codeine Unknown (See Comments) and GI Intolerance   • Percocet [Oxycodone-Acetaminophen]    • Tramadol Other (See Comments)     Increased BP and HR       Objective      Vitals:    05/10/18 0903   BP: 92/56   Pulse: 89   Resp: 18   Temp: 99.1 °F (37.3 °C)   TempSrc: Tympanic   SpO2: 91%   Weight: 65.7 kg (144 lb 12.8 oz)   Height: 177.8 cm (70\")         Current Status 5/10/2018   ECOG score 3         Physical Exam    General Appearance: Pale and frail appearing. Patient is awake, alert, oriented and in no acute distress. Patient is welldeveloped, wellnourished, and appears stated age.  HEENT: Normocephalic. Sclerae clear, conjunctiva pink, extraocular movements intact, pupils, round, reactive to light and  accommodation. Mouth and throat are clear with moist oral mucosa.  NECK: Supple, no jugular venous distention, thyroid not enlarged.  LYMPH: No cervical, supraclavicular, axillary, or inguinal lymphadenopathy.  CHEST: Equal bilateral expansion, AP  diameter normal, resonant percussion note  LUNGS: Good air movement, no rales, rhonchi, rubs or wheezes with auscultation  CARDIO: Regular sinus rhythm, no murmurs, gallops or rubs.  ABDOMEN: Nondistended, soft, No tenderness, no guarding, no rebound, No hepatosplenomegaly. No abdominal masses. Bowel sounds positive. No hernia  GENITALIA: Not examined.  BREASTS: Not examined.  MUSKEL: No joint swelling, decreased motion, or inflammation  EXTREMS: nhan LE edema, No clubbing, cyanosis, No varicose veins.  NEURO: Grossly nonfocal, Gait is coordinated and smooth, Cognition is preserved.  SKIN: No rashes, no ecchymoses, no " petechia.  PSYCH: Oriented to time, place and person. Memory is preserved. Mood and affect appear normal  RECENT LABS:  Office Visit on 05/10/2018   Component Date Value Ref Range Status   • PSA 05/10/2018 1110.000* 0.000 - 4.000 ng/mL Final   Lab on 05/10/2018   Component Date Value Ref Range Status   • Glucose 05/10/2018 152* 70 - 100 mg/dL Final   • BUN 05/10/2018 35* 5 - 21 mg/dL Final   • Creatinine 05/10/2018 0.60  0.50 - 1.40 mg/dL Final   • Sodium 05/10/2018 132* 135 - 145 mmol/L Final   • Potassium 05/10/2018 4.3  3.5 - 5.3 mmol/L Final   • Chloride 05/10/2018 96* 98 - 110 mmol/L Final   • CO2 05/10/2018 25.0  24.0 - 31.0 mmol/L Final   • Calcium 05/10/2018 7.1* 8.4 - 10.4 mg/dL Final   • Total Protein 05/10/2018 5.8* 6.3 - 8.7 g/dL Final   • Albumin 05/10/2018 3.00* 3.50 - 5.00 g/dL Final   • ALT (SGPT) 05/10/2018 32  0 - 54 U/L Final   • AST (SGOT) 05/10/2018 177* 7 - 45 U/L Final   • Alkaline Phosphatase 05/10/2018 658* 24 - 120 U/L Final   • Total Bilirubin 05/10/2018 0.6  0.1 - 1.0 mg/dL Final   • eGFR Non African Amer 05/10/2018 131  >60 mL/min/1.73 Final   • Globulin 05/10/2018 2.8  gm/dL Final   • A/G Ratio 05/10/2018 1.1  1.1 - 2.5 g/dL Final   • BUN/Creatinine Ratio 05/10/2018 58.3* 7.0 - 25.0 Final   • Anion Gap 05/10/2018 11.0  4.0 - 13.0 mmol/L Final   • WBC 05/10/2018 10.53  4.80 - 10.80 10*3/mm3 Final   • RBC 05/10/2018 3.52* 4.80 - 5.90 10*6/mm3 Final   • Hemoglobin 05/10/2018 10.3* 14.0 - 18.0 g/dL Final   • Hematocrit 05/10/2018 33.1* 40.0 - 52.0 % Final   • MCV 05/10/2018 94.0  82.0 - 95.0 fL Final   • MCH 05/10/2018 29.3  28.0 - 32.0 pg Final   • MCHC 05/10/2018 31.1* 33.0 - 36.0 g/dL Final   • RDW 05/10/2018 20.8* 12.0 - 15.0 % Final   • RDW-SD 05/10/2018 69.4* 40.0 - 54.0 fl Final   • MPV 05/10/2018 9.3  6.0 - 12.0 fL Final   • Platelets 05/10/2018 203  130 - 400 10*3/mm3 Final   • nRBC 05/10/2018 4.8* 0.0 - 0.0 /100 WBC Final   • Extra Tube 05/10/2018 Hold for add-ons.   Final   •  Extra Tube 05/10/2018 Hold for add-ons.   Final   • Neutrophil % 05/10/2018 73.0  39.0 - 78.0 % Final   • Lymphocyte % 05/10/2018 12.0* 15.0 - 45.0 % Final   • Monocyte % 05/10/2018 2.0* 4.0 - 12.0 % Final   • Bands %  05/10/2018 9.0  0.0 - 10.0 % Final   • Myelocyte % 05/10/2018 2.0* 0.0 - 0.0 % Final   • Atypical Lymphocyte % 05/10/2018 1.0  0.0 - 5.0 % Final   • Blasts % 05/10/2018 1.0* 0.0 - 0.0 % Final   • Neutrophils Absolute 05/10/2018 8.63* 1.87 - 8.40 10*3/mm3 Final   • Lymphocytes Absolute 05/10/2018 1.26  0.72 - 4.86 10*3/mm3 Final   • Monocytes Absolute 05/10/2018 0.21  0.19 - 1.30 10*3/mm3 Final   • nRBC 05/10/2018 8.0* 0.0 - 0.0 /100 WBC Final   • Anisocytosis 05/10/2018 Mod/2+  None Seen Final   • Hypochromia 05/10/2018 Mod/2+  None Seen Final   • Poikilocytes 05/10/2018 Slight/1+  None Seen Final   • Polychromasia 05/10/2018 Slight/1+  None Seen Final   • Schistocytes 05/10/2018 Slight/1+  None Seen Final   • WBC Morphology 05/10/2018 Normal  Normal Final   • Platelet Morphology 05/10/2018 Normal  Normal Final   • Performed by: 05/10/2018 Tariq Clements M.D.   Final   • Pathologist Interpretation 05/10/2018    Final                    Value:Reviewed. The differential cell count is: Neutrophils 73%, neutrophilic bands 8%, lymphocytes 9%, monocytes 5%, myelocytes 3% and metamyelocytes 2%. Five nucleated red blood cells are present. There is marked erythrocytic anisocytosis and poikilocytosis with significant polychromatophia.  No schistocytes are noted.  The platelets appear morphologically and quantitatively normal. There is a left shift of the myeloid series with no obvious toxic or dysplastic changes. No blasts are identified.       RADIOLOGY:  Ct Chest W Contrast    Addendum Date: 5/10/2018 Addendum:   Addendum: The patient's outside CT exam from 11/16/2017 is now loaded on the PACS system and available for comparison.  The 12 mm anterior mediastinal lymph node positioned below the  brachiocephalic vein is new a right internal mammary lymph node measuring 9 to 10 mm in short axis has increased in size from the outside study when it measured 6 mm. The pleural effusions are new.  The 3 mm right upper lobe nodule remains stable. A questionable 6 mm medial left lower lobe nodule is most likely a regional vessel and is similar to the previous exam. Nodularity along the right major fissure is again noted. A 3 to 4 mm nodule in the posterior right upper lobe on image 43 is not localized on the prior study, however this could relate to changes in slice selection. Continued follow-up recommended.  The sclerotic metastatic appearance to the regional bony marrow has increased since 11/16/2017. This report was finalized on 05/10/2018 14:49 by Dr. Nelida Knight MD.    Result Date: 5/10/2018  Narrative: EXAMINATION: CT CHEST W CONTRAST- 5/1/2018 12:39 PM CDT  HISTORY: Prostate cancer with progression  COMPARISON: None  DOSE: 292 mGy-cm  TECHNIQUE: Sequential imaging was performed from the thoracic inlet through the upper abdomen after the administration of IV contrast. Sagittal and coronal reformations were made from the original source data and reviewed. Automated exposure control was also utilized to decrease patient radiation dose.  FINDINGS: Several hypodensities are seen in the thyroid gland. The trachea and main bronchi are widely patent and in normal anatomic position. The esophagus is grossly normal in appearance.  There is no appreciable axillary lymphadenopathy. An anterior mediastinal lymph node is seen which measures 12 mm in short axis. Several additional smaller lymph nodes are seen throughout the mediastinum. There is no appreciable hilar lymphadenopathy.  The heart appears normal in size. Calcifications are seen within the LAD, left circumflex, and right coronary arteries. The thoracic aorta appears normal in caliber. Atherosclerotic ulcerations are seen within the aorta and its branch  vessels. There is evidence of previous CABG. The main pulmonary artery appears normal in caliber.  There is a small right and moderate-sized left pleural effusion. There is associated compressive atelectasis. Respiratory motion limits evaluation for small nodules. There appears to be some thickening along the left major fissure without a discrete nodule identified. There is a questionable nodule in the left lower lobe measuring approximately 6 mm in size (axial image 83). A small nodule is seen posteriorly in the right upper lobe measuring approximately 3 mm in size (axial image 35). A small nodule is seen along the right major fissure measuring approximately 4 mm in size (axial image 108).  Please see the separate CT abdomen and pelvis report from the same day for findings in the upper abdomen.  Review of the visualized osseous structures demonstrates diffuse sclerotic metastases throughout all visualized osseous structures including the spine, ribs, sternum, clavicles, and scapulae. Degenerative changes are seen in the spine. There is mild anterior wedging of the T2 vertebral body.      Impression: 1. Small right moderate sized left pleural effusion with associated compressive atelectasis. 2. Tiny noncalcified pulmonary nodules bilaterally, below the threshold for PET/CT resolution, for which short interval follow-up is recommended. 3. Enlarged anterior mediastinal lymph node measures up to 12 mm in short axis. Metastatic disease cannot be excluded. 4. Diffuse bony metastases. 5. Atherosclerosis of the aorta and coronary arteries. 6. Please see the separate CT abdomen and pelvis report of same day for findings in the upper abdomen.  This report was finalized on 05/01/2018 12:46 by Dr. Vineet Robison MD.    Nm Bone Scan Whole Body    Addendum Date: 5/10/2018 Addendum:   Addendum: The outside nuclear medicine bone scan from 11/16/2017 is now available for comparison. Diffuse metastasis to the axial and appendicular  skeleton is again visualized. Increasing tracer uptake is considered within the shoulders, humeri, and elbows. Increasing patchy activity also considered within the skull since the previous study. Mild progression of bony metastasis is favored. This report was finalized on 05/10/2018 15:04 by Dr. Nelida Knight MD.    Result Date: 5/10/2018  Narrative: EXAMINATION:  NM BONE SCAN WHOLE BODY-  5/1/2018 11:50 AM CDT  HISTORY: metastatic prostate ca assess for progression; N74-Pludegfer neoplasm of prostate; C79.51-Secondary malignant neoplasm of bone  COMPARISON: Chest abdomen pelvis CT images performed today. Nuclear medicine bone scan dated 3/14/2013  TECHNIQUE:  20.6 mCi of technetium 99m labeled HDP was administered intravenously. Anterior posterior whole body images were obtained.  FINDINGS:  There are extensive osseous metastases with areas of increased radiotracer activity throughout the axial skeleton including the spine ribs pelvic bones and calvarium.  There is increased tracer activity also noted in the upper extremities particularly humeri bilaterally with in the shoulder girdles, clavicles and scapula as well as the proximal femurs particularly on the left.  Diffuse blastic lesions appreciated also observed on the CT examinations.  There is excretion of the radionuclide by the urinary tract system.      Impression: 1. Extensive osseous metastases involving the axial and appendicular skeleton. This report was finalized on 05/01/2018 14:08 by Dr. Jules Lovett MD.    Ct Abdomen Pelvis W Contrast    Addendum Date: 5/10/2018 Addendum:   Addendum: Patient's outside CT exam from 11/16/2017 is now available for review.  The left inguinal lymph nodes have increased in size. The largest lymph node measures 1.8 cm in short axis, an increased from the previous 7 mm measurement. There are increasing sclerotic lesions of the lumbar spine with new mild compression of the superior endplate of L3 vertebra. This report  was finalized on 05/10/2018 15:00 by Dr. Nelida Knight MD.    Result Date: 5/10/2018  Narrative: EXAMINATION: CT ABDOMEN PELVIS W CONTRAST- 5/1/2018 1:19 PM CDT  HISTORY: Prostate cancer with progression  COMPARISON: CT abdomen and pelvis with and without contrast 3/14/2013  DOSE: 249 mGy-cm  TECHNIQUE: Sequential imaging was performed from the lung bases through the pubic symphysis after the administration of IV contrast.  Sagittal and coronal reformations were made from the original source data and reviewed. Automated exposure control was also utilized to decrease patient radiation dose.  FINDINGS: Please see the separate CT chest report from the same day for findings in the lung bases.  Dual phase imaging of the liver was performed, demonstrating no focal liver lesions. The gallbladder is surgically absent. The spleen, pancreas, and adrenal glands are grossly normal in appearance. Small bilateral renal cysts are present, both slightly increased in size compared to the previous exam.  The main portal and splenic veins and IVC appear grossly normal. The abdominal aorta appears normal in caliber without evidence of aneurysm or dissection. The origins of the celiac axis, superior mesenteric artery, and inferior mesenteric artery enhance normally. There are scattered atherosclerotic calcifications of the aorta and its branch vessels.  No central mesenteric or retroperitoneal lymphadenopathy is appreciated.  Apparent gastric wall thickening is felt to be related to underdistention. The stomach and small bowel appear normal in caliber. There are a few scattered colonic diverticula without evidence of diverticulitis. No free air or free fluid is seen in the abdomen.  The urinary bladder is nearly completely decompressed and not well evaluated. Mild circumferential urinary bladder wall thickening is noted. There is mild inflammatory stranding in the presacral space, possibly related to treatment changes. Several  pathologically enlarged left inguinal lymph nodes are seen which measure up to 15 mm in short axis.  Review of the visualized osseous structures demonstrates diffuse sclerotic metastases throughout the visualized spine and pelvis. There is minimal cortical irregularity in the left iliac wing with an adjacent soft tissue component.         Impression: 1. Diffuse bony metastases. There is cortical disruption in the left iliac wing with an adjacent soft tissue component concerning for extension of metastatic disease into the adjacent soft tissues. 2. Enlarged left inguinal lymph nodes are concerning for metastatic lymphadenopathy. 3. Mild presacral inflammation may be related to interval treatment therapy. 4. Atherosclerotic disease. 5. Please see the separate CT chest report from the same day for findings in the lung bases.    This report was finalized on 05/01/2018 13:35 by Dr. Vineet Robison MD.           Assessment/Plan  Jay Jones is a 77 y.o. year old male     Patient Active Problem List   Diagnosis   • HLD (hyperlipidemia)   • CAD (coronary artery disease)   • Atrial flutter   • Prostate cancer metastatic to bone   • Antineoplastic chemotherapy induced anemia   • Generalized edema      Assessment/Plan 77-year-old man with metastatic prostate cancer mainly bone last treated on Docetaxol plus prednisone now with rising psa.    1.  Prostate cancer: Bone scan and CTs showed known numerous bone metastasis and some lymphadenopathy. We will have it compared to prior.  --had detailed discussion with pt and family(wife and other relative). Pt is refractory on multiple regimen. Chances of response is greatly diminished. I discussed cabazitaxel vs ZEE. Given that he is deconditioned, I.e. Fluid overloaded, weak, dyspneic, I want to maximize him first because I  Don think he can tolerate cabazitaxel now. Will diurese and start premarin 0.625 mg daily. rtc in 1-2 weeks to re-eval  -- We will continue Lupron shot every 6  months.  2.  Bone metastases: will start xgeva.  Continue calcium.    3.  Congestive heart failure:  Continue Diovan, furosemide. Pt fluid overloaded with nhan edema up to his knees. Will diures    4.  Diabetes mellitus continue metformin.  5.  Anemia: We will check iron panel B12 folate.  6.  Hyperlipidemia continue Crestor.  7. Anemia: s/p pRBC. Hg of 10.8.  8. BLEE: I laxis in clinic today,  advised pt to start 40mg oral lasix for 5 days then transition to 20mg daily. Also take potassium pill daily.   8. Sinus congestion/cough: no fever or chills. Advised claritin and mucinex.        Ash Ojeda MD    5/10/2018    10:16 AM

## 2018-05-14 NOTE — TELEPHONE ENCOUNTER
Returned call to patient wife, she had called and questioned why her , (patient) had a paulie apt this coming Thursday 5/17/18 doesn't understand what the apt is paulie for since he was seen in office last week.  Discussed with wife, that the paulie apt for this week is for f/u and eval of patients symptom complaints at last visit with Dr Ojeda. She states the lower extremity swelling continues with little improved results, with both legs feeling very firm and tight, difficulties with ambulation and uses a w/c. No weeping noted at this time, pitting edema of 4+, but continues on the Lasix as directed qd. Patient continues to be very weak and fatigued, sob. Wife v/u of f/u apt that is paulie.   Also she questions why she picked up his script for Premarin last Thursday and it was indicated that he take on 2 tabs now with no further directions, reviewed his prescription dose and directions and informed her that he was to take the Premarin 21 days on and 7 days off. She was instructed to restart the Premarin one tab qd x 21 days and I would discuss with the Pharmacy [facility] that they had written the dosing instructions down incorrectly.   Wife v/u of restarting the Premarin 1 tab po qd 21 days on and 7 days off.

## 2018-05-17 NOTE — PROGRESS NOTES
[facility]  HEMATOLOGY & ONCOLOGY    Cancer Staging Information:  No matching staging information was found for the patient.      Subjective     VISIT DIAGNOSIS:   Encounter Diagnoses   Name Primary?   • Physical deconditioning Yes   • Prostate cancer metastatic to bone        REASON FOR VISIT:     No chief complaint on file.       HEMATOLOGY / ONCOLOGY HISTORY:   Oncology/Hematology History    7 1013 surgical resection on our ALP, [place] 9, PTT 3 BN 1.  PSA increase increased from 0.9 December 2013 to 4.76 June 2014 to 11.29 September 2014.  Bone scan September 2014 show uptake in the right scapula.  Lupron started October 2014 to present.  This is  October 2014 for one month.  PSA undetectable until March 2016.  07/03/2016 PSA 2.6.  Casodex restarted 07/15/2016.  CT and bone scan 08/15/2016 showed metastasis in the left hemipelvis.  Left scapula, lymphadenopathy and presacral and perineural to area.  DEXA showed osteoporotic February 2017 completed SipurucelT.  November 2016 started enzalutamide discontinued August 2017.  Started arbiraterone and prednisone 08/18/2017.  Bone scan 09/19/17. Extensive skeletal metastases with progression  - CT scan 09/19/17. Progression of osseous metastatic disease with a destructive lesion involving the left iliac wing. Mildly prominent perirectalnodes.   - 10/12/2017. Started Radium 223, received one additional dose on 11/09/2017. Sought second opinion with [facility] (Dr. Ryanne Palmer) due to clinical disease progression.   -12/14/2017. Started Docetaxel with Dr. Palmer of [facility]. Received #2 on 01/04/2018.   -January 25 218 receives he is status post cycle 4 of docetaxel plus prednisone at [facility] and admitted several Dr. Adames.        Prostate cancer metastatic to bone    2/6/2018 Initial Diagnosis     Prostate cancer metastatic to bone              INTERVAL HISTORY  Patient ID: Jay Jones is a 77 y.o. year old male  Jay Jones is a 77 y.o. year old  male presentation to continue chemotherapy for his metastatic prostate cancer.  He complains of excessive fatigue after his chemotherapy.  He does have occasional left hip pain, left femur pain, and right scapula pain.  His last Lupron shot was in January 25 at [facility].  to be done every 6 months.  He does have bilateral lower estimated edema which is chronic.  This is secondary to his congestive heart failure.  He also has a history of bypass.  He takes Lasix and it will go down but he does not like to take Lasix all the time.  - had palpitation. His cardiologist will give him ambulatory monitor  -- tires the week after tx. S/p blood transfusion 3 weeks ago. Getting venofer.  --5/17/18: Therapy with docetaxol stopped due to rising PSA> pt deconditioned. Stopped water pill and was extremely short of breath. S/p diuresis since last week. Currently on 20mg of lasix. Allie to complain of tiredness and weakness. No appetite, has lost weight.    Past Medical History:   Past Medical History:   Diagnosis Date   • Asthma     ASTHMA NOS W/ACUTE EXACERBATION   • CAD in native artery    • Cancer    • Coronary artery disease    • HLD (hyperlipidemia)     Hyperlipidemia, unspecified   • Infectious viral hepatitis     HEPATITIS A, VIRAL, W/O HEPATIC COMA   • Prostate CA 2/6/2018   • Prostate cancer metastatic to bone 2/6/2018   • Unspecified atrial flutter      Past Surgical History:   Past Surgical History:   Procedure Laterality Date   • APPENDECTOMY     • CARDIAC CATHETERIZATION Left 11/01/2006    -with findings of three-vessel coronary artery disease with normal left ventricular function.   • CHOLECYSTECTOMY     • CORONARY ARTERY BYPASS GRAFT  11/03/2006    CABG times five with LIMA grafted in a sequential fashion to the first diagonal and left anterior descending followed by a saphenous vein graft to the first and second obtuse marginal branches and then an individual saphenous vein graft to the right coronary artery.   •  OTHER SURGICAL HISTORY  02/28/2007    electrocardioversion   • PROSTATECTOMY       Social History:   Social History     Social History   • Marital status:      Spouse name: N/A   • Number of children: N/A   • Years of education: N/A     Occupational History   • Not on file.     Social History Main Topics   • Smoking status: Former Smoker     Types: Cigars     Quit date: 05/2017   • Smokeless tobacco: Never Used   • Alcohol use No   • Drug use: No   • Sexual activity: Defer     Other Topics Concern   • Not on file     Social History Narrative   • No narrative on file     Family History:   Family History   Problem Relation Age of Onset   • Heart disease Father    • Heart attack Father    • Heart failure Father    • Heart disease Maternal Grandfather    • Alzheimer's disease Mother    • Heart disease Other        Review of Systems   Constitutional: Negative.    HENT: Negative.    Eyes: Negative.    Respiratory: Negative.    Cardiovascular: Positive for palpitations.   Gastrointestinal: Negative.    Genitourinary: Negative.    Musculoskeletal:        Micha latonya    Skin: Negative.    Neurological: Negative.    Hematological: Negative.    Psychiatric/Behavioral: Negative.         Performance Status:  Symptomatic; fully ambulatory    Medications:    Current Outpatient Prescriptions   Medication Sig Dispense Refill   • aspirin 81 MG EC tablet Take 81 mg by mouth Daily.     • CALCIUM PO Take  by mouth.     • celecoxib (CeleBREX) 100 MG capsule Take 100 mg by mouth 2 (Two) Times a Day As Needed for Mild Pain .     • estrogens, conjugated, (PREMARIN) 0.625 MG tablet Take 1 tablet by mouth Daily for 30 days. Take daily for 21 days then do not take for 7 days. 30 tablet 1   • furosemide (LASIX) 40 MG tablet TAKE 1 TABLET BY MOUTH x 5 days, then 1/2 tab daily  2   • KLOR-CON 10 MEQ CR tablet TAKE 1 TABLET DAILY  6   • metFORMIN XR (GLUCOPHAGE-XR) 500 MG 24 hr tablet TAKE 1 TABLET TWICE A DAY  3   • montelukast (SINGULAIR) 10  "MG tablet Take 10 mg by mouth Every Night.     • predniSONE (DELTASONE) 5 MG tablet Take 5 mg by mouth 2 (Two) Times a Day.     • rosuvastatin (CRESTOR) 40 MG tablet TAKE 1 TABLET BY MOUTH EVERY NIGHT AT BEDTIME  6   • sennosides-docusate sodium (SENOKOT-S) 8.6-50 MG tablet Take 1 tablet by mouth 4 (Four) Times a Day.     • SYMBICORT 160-4.5 MCG/ACT inhaler USE 2 PUFFS DAILY  7   • valsartan (DIOVAN) 40 MG tablet TAKE 1 TABLET EVERY DAY  3   • dronabinol (MARINOL) 5 MG capsule Take 1 capsule by mouth 2 (Two) Times a Day Before Meals for 30 days. 60 capsule 0     No current facility-administered medications for this visit.        ALLERGIES:    Allergies   Allergen Reactions   • Oxycodone-Acetaminophen Unknown (See Comments)     Reaction: hallucinations   • Codeine Unknown (See Comments) and GI Intolerance   • Percocet [Oxycodone-Acetaminophen]    • Tramadol Other (See Comments)     Increased BP and HR       Objective      Vitals:    05/17/18 0903   BP: 102/68   Pulse: 100   Resp: 16   Temp: 97.3 °F (36.3 °C)   TempSrc: Tympanic   SpO2: (!) 89%   Weight: 63.4 kg (139 lb 12.8 oz)   Height: 177.8 cm (70\")         Current Status 5/17/2018   ECOG score 2         Physical Exam    General Appearance: Pale and frail appearing. Patient is awake, alert, oriented and in no acute distress. Patient is welldeveloped, wellnourished, and appears stated age.  HEENT: Normocephalic. Sclerae clear, conjunctiva pink, extraocular movements intact, pupils, round, reactive to light and  accommodation. Mouth and throat are clear with moist oral mucosa.  NECK: Supple, no jugular venous distention, thyroid not enlarged.  LYMPH: No cervical, supraclavicular, axillary, or inguinal lymphadenopathy.  CHEST: Equal bilateral expansion, AP  diameter normal, resonant percussion note  LUNGS: Good air movement, no rales, rhonchi, rubs or wheezes with auscultation  CARDIO: Regular sinus rhythm, no murmurs, gallops or rubs.  ABDOMEN: Nondistended, soft, " No tenderness, no guarding, no rebound, No hepatosplenomegaly. No abdominal masses. Bowel sounds positive. No hernia  GENITALIA: Not examined.  BREASTS: Not examined.  MUSKEL: No joint swelling, decreased motion, or inflammation  EXTREMS: nhan LE edema, No clubbing, cyanosis, No varicose veins.  NEURO: Grossly nonfocal, Gait is coordinated and smooth, Cognition is preserved.  SKIN: No rashes, no ecchymoses, no petechia.  PSYCH: Oriented to time, place and person. Memory is preserved. Mood and affect appear normal  RECENT LABS:  Lab on 05/17/2018   Component Date Value Ref Range Status   • WBC 05/17/2018 9.14  4.80 - 10.80 10*3/mm3 Preliminary   • RBC 05/17/2018 3.47* 4.80 - 5.90 10*6/mm3 Preliminary   • Hemoglobin 05/17/2018 10.1* 14.0 - 18.0 g/dL Preliminary   • Hematocrit 05/17/2018 33.0* 40.0 - 52.0 % Preliminary   • MCV 05/17/2018 95.1* 82.0 - 95.0 fL Preliminary   • MCH 05/17/2018 29.1  28.0 - 32.0 pg Preliminary   • MCHC 05/17/2018 30.6* 33.0 - 36.0 g/dL Preliminary   • RDW 05/17/2018 21.1* 12.0 - 15.0 % Preliminary   • RDW-SD 05/17/2018 70.8* 40.0 - 54.0 fl Preliminary   • MPV 05/17/2018 9.3  6.0 - 12.0 fL Preliminary   • Platelets 05/17/2018 175  130 - 400 10*3/mm3 Preliminary   • nRBC 05/17/2018 3.4* 0.0 - 0.0 /100 WBC Preliminary       RADIOLOGY:  Ct Chest W Contrast    Addendum Date: 5/10/2018 Addendum:   Addendum: The patient's outside CT exam from 11/16/2017 is now loaded on the PACS system and available for comparison.  The 12 mm anterior mediastinal lymph node positioned below the brachiocephalic vein is new a right internal mammary lymph node measuring 9 to 10 mm in short axis has increased in size from the outside study when it measured 6 mm. The pleural effusions are new.  The 3 mm right upper lobe nodule remains stable. A questionable 6 mm medial left lower lobe nodule is most likely a regional vessel and is similar to the previous exam. Nodularity along the right major fissure is again noted. A 3  to 4 mm nodule in the posterior right upper lobe on image 43 is not localized on the prior study, however this could relate to changes in slice selection. Continued follow-up recommended.  The sclerotic metastatic appearance to the regional bony marrow has increased since 11/16/2017. This report was finalized on 05/10/2018 14:49 by Dr. Nelida Knight MD.    Result Date: 5/10/2018  Narrative: EXAMINATION: CT CHEST W CONTRAST- 5/1/2018 12:39 PM CDT  HISTORY: Prostate cancer with progression  COMPARISON: None  DOSE: 292 mGy-cm  TECHNIQUE: Sequential imaging was performed from the thoracic inlet through the upper abdomen after the administration of IV contrast. Sagittal and coronal reformations were made from the original source data and reviewed. Automated exposure control was also utilized to decrease patient radiation dose.  FINDINGS: Several hypodensities are seen in the thyroid gland. The trachea and main bronchi are widely patent and in normal anatomic position. The esophagus is grossly normal in appearance.  There is no appreciable axillary lymphadenopathy. An anterior mediastinal lymph node is seen which measures 12 mm in short axis. Several additional smaller lymph nodes are seen throughout the mediastinum. There is no appreciable hilar lymphadenopathy.  The heart appears normal in size. Calcifications are seen within the LAD, left circumflex, and right coronary arteries. The thoracic aorta appears normal in caliber. Atherosclerotic ulcerations are seen within the aorta and its branch vessels. There is evidence of previous CABG. The main pulmonary artery appears normal in caliber.  There is a small right and moderate-sized left pleural effusion. There is associated compressive atelectasis. Respiratory motion limits evaluation for small nodules. There appears to be some thickening along the left major fissure without a discrete nodule identified. There is a questionable nodule in the left lower lobe measuring  approximately 6 mm in size (axial image 83). A small nodule is seen posteriorly in the right upper lobe measuring approximately 3 mm in size (axial image 35). A small nodule is seen along the right major fissure measuring approximately 4 mm in size (axial image 108).  Please see the separate CT abdomen and pelvis report from the same day for findings in the upper abdomen.  Review of the visualized osseous structures demonstrates diffuse sclerotic metastases throughout all visualized osseous structures including the spine, ribs, sternum, clavicles, and scapulae. Degenerative changes are seen in the spine. There is mild anterior wedging of the T2 vertebral body.      Impression: 1. Small right moderate sized left pleural effusion with associated compressive atelectasis. 2. Tiny noncalcified pulmonary nodules bilaterally, below the threshold for PET/CT resolution, for which short interval follow-up is recommended. 3. Enlarged anterior mediastinal lymph node measures up to 12 mm in short axis. Metastatic disease cannot be excluded. 4. Diffuse bony metastases. 5. Atherosclerosis of the aorta and coronary arteries. 6. Please see the separate CT abdomen and pelvis report of same day for findings in the upper abdomen.  This report was finalized on 05/01/2018 12:46 by Dr. Vineet Robison MD.    Nm Bone Scan Whole Body    Addendum Date: 5/10/2018 Addendum:   Addendum: The outside nuclear medicine bone scan from 11/16/2017 is now available for comparison. Diffuse metastasis to the axial and appendicular skeleton is again visualized. Increasing tracer uptake is considered within the shoulders, humeri, and elbows. Increasing patchy activity also considered within the skull since the previous study. Mild progression of bony metastasis is favored. This report was finalized on 05/10/2018 15:04 by Dr. Nelida Knight MD.    Result Date: 5/10/2018  Narrative: EXAMINATION:  NM BONE SCAN WHOLE BODY-  5/1/2018 11:50 AM CDT  HISTORY:  metastatic prostate ca assess for progression; C63-[id number] neoplasm of prostate; C79.51-Secondary malignant neoplasm of bone  COMPARISON: Chest abdomen pelvis CT images performed today. Nuclear medicine bone scan dated 3/14/2013  TECHNIQUE:  20.6 mCi of technetium 99m labeled HDP was administered intravenously. Anterior posterior whole body images were obtained.  FINDINGS:  There are extensive osseous metastases with areas of increased radiotracer activity throughout the axial skeleton including the spine ribs pelvic bones and calvarium.  There is increased tracer activity also noted in the upper extremities particularly humeri bilaterally with in the shoulder girdles, clavicles and scapula as well as the proximal femurs particularly on the left.  Diffuse blastic lesions appreciated also observed on the CT examinations.  There is excretion of the radionuclide by the urinary tract system.      Impression: 1. Extensive osseous metastases involving the axial and appendicular skeleton. This report was finalized on 05/01/2018 14:08 by Dr. Jules Lovett MD.    Ct Abdomen Pelvis W Contrast    Addendum Date: 5/10/2018 Addendum:   Addendum: Patient's outside CT exam from 11/16/2017 is now available for review.  The left inguinal lymph nodes have increased in size. The largest lymph node measures 1.8 cm in short axis, an increased from the previous 7 mm measurement. There are increasing sclerotic lesions of the lumbar spine with new mild compression of the superior endplate of L3 vertebra. This report was finalized on 05/10/2018 15:00 by Dr. Nelida Knight MD.    Result Date: 5/10/2018  Narrative: EXAMINATION: CT ABDOMEN PELVIS W CONTRAST- 5/1/2018 1:19 PM CDT  HISTORY: Prostate cancer with progression  COMPARISON: CT abdomen and pelvis with and without contrast 3/14/2013  DOSE: 249 mGy-cm  TECHNIQUE: Sequential imaging was performed from the lung bases through the pubic symphysis after the administration of IV contrast.   Sagittal and coronal reformations were made from the original source data and reviewed. Automated exposure control was also utilized to decrease patient radiation dose.  FINDINGS: Please see the separate CT chest report from the same day for findings in the lung bases.  Dual phase imaging of the liver was performed, demonstrating no focal liver lesions. The gallbladder is surgically absent. The spleen, pancreas, and adrenal glands are grossly normal in appearance. Small bilateral renal cysts are present, both slightly increased in size compared to the previous exam.  The main portal and splenic veins and IVC appear grossly normal. The abdominal aorta appears normal in caliber without evidence of aneurysm or dissection. The origins of the celiac axis, superior mesenteric artery, and inferior mesenteric artery enhance normally. There are scattered atherosclerotic calcifications of the aorta and its branch vessels.  No central mesenteric or retroperitoneal lymphadenopathy is appreciated.  Apparent gastric wall thickening is felt to be related to underdistention. The stomach and small bowel appear normal in caliber. There are a few scattered colonic diverticula without evidence of diverticulitis. No free air or free fluid is seen in the abdomen.  The urinary bladder is nearly completely decompressed and not well evaluated. Mild circumferential urinary bladder wall thickening is noted. There is mild inflammatory stranding in the presacral space, possibly related to treatment changes. Several pathologically enlarged left inguinal lymph nodes are seen which measure up to 15 mm in short axis.  Review of the visualized osseous structures demonstrates diffuse sclerotic metastases throughout the visualized spine and pelvis. There is minimal cortical irregularity in the left iliac wing with an adjacent soft tissue component.         Impression: 1. Diffuse bony metastases. There is cortical disruption in the left iliac wing with  an adjacent soft tissue component concerning for extension of metastatic disease into the adjacent soft tissues. 2. Enlarged left inguinal lymph nodes are concerning for metastatic lymphadenopathy. 3. Mild presacral inflammation may be related to interval treatment therapy. 4. Atherosclerotic disease. 5. Please see the separate CT chest report from the same day for findings in the lung bases.    This report was finalized on 05/01/2018 13:35 by Dr. Vineet Robison MD.           Assessment/Plan  Jay Jones is a 77 y.o. year old male     Patient Active Problem List   Diagnosis   • HLD (hyperlipidemia)   • CAD (coronary artery disease)   • Atrial flutter   • Prostate cancer metastatic to bone   • Antineoplastic chemotherapy induced anemia   • Generalized edema      Assessment/Plan 77-year-old man with metastatic prostate cancer mainly bone last treated on Docetaxol plus prednisone now with rising psa.    1.  Prostate cancer: Bone scan and CTs showed known numerous bone metastasis and some lymphadenopathy. We will have it compared to prior.  --had detailed discussion with pt and family(wife and other relative). Pt is refractory on multiple regimen. Chances of response is greatly diminished. I discussed cabazitaxel vs ZEE. Given that he is deconditioned, I.e. Fluid overloaded, weak, dyspneic, I want to maximize him first because I  Don think he can tolerate cabazitaxel now. Will diurese and start premarin 0.625 mg daily(3weeks on 1 week off). rtc in 1-2 weeks to re-eval  -- We will continue Lupron shot every 6 months.    2.  Bone metastases: will start xgeva.  Continue calcium.    3.  Congestive heart failure:  Continue Diovan, furosemide. Pt fluid overloaded with nhan edema up to his knees. Will diures    4.  Diabetes mellitus continue metformin.  5.  Anemia: We will check iron panel B12 folate.  6.  Hyperlipidemia continue Crestor.  7. Anemia: s/p pRBC. Hg of 10.8.  8. BLEE:On 20mg of lasix daily. Also take potassium pill  daily.   9.FEN: pt with no appetite, weight loss and no energy. Given on primarin and risk of blood clot with remeron and estrogen being high, will treat with marinol per [facility] jayshree for palliative care in cancer pts.  10. Dspnea: DALE will refer for home o2.          Ash Ojeda MD    5/17/2018    9:23 AM

## 2018-05-31 PROBLEM — E83.51 HYPOCALCEMIA: Status: ACTIVE | Noted: 2018-01-01

## 2018-05-31 NOTE — PROGRESS NOTES
[facility]  HEMATOLOGY & ONCOLOGY    Cancer Staging Information:  No matching staging information was found for the patient.      Subjective     VISIT DIAGNOSIS:   Encounter Diagnosis   Name Primary?   • Prostate cancer metastatic to bone Yes       REASON FOR VISIT:     Chief Complaint   Patient presents with   • Prostate Cancer     f/u        HEMATOLOGY / ONCOLOGY HISTORY:   Oncology/Hematology History    7 1013 surgical resection on our ALP, [place] 9, PTT 3 BN 1.  PSA increase increased from 0.9 December 2013 to 4.76 June 2014 to 11.29 September 2014.  Bone scan September 2014 show uptake in the right scapula.  Lupron started October 2014 to present.  This is  October 2014 for one month.  PSA undetectable until March 2016.  07/03/2016 PSA 2.6.  Casodex restarted 07/15/2016.  CT and bone scan 08/15/2016 showed metastasis in the left hemipelvis.  Left scapula, lymphadenopathy and presacral and perineural to area.  DEXA showed osteoporotic February 2017 completed SipurucelT.  November 2016 started enzalutamide discontinued August 2017.  Started arbiraterone and prednisone 08/18/2017.  Bone scan 09/19/17. Extensive skeletal metastases with progression  - CT scan 09/19/17. Progression of osseous metastatic disease with a destructive lesion involving the left iliac wing. Mildly prominent perirectalnodes.   - 10/12/2017. Started Radium 223, received one additional dose on 11/09/2017. Sought second opinion with [facility] (Dr. Ryanne Palmer) due to clinical disease progression.   -12/14/2017. Started Docetaxel with Dr. Palmer of [facility]. Received #2 on 01/04/2018.   -January 25 218 receives he is status post cycle 4 of docetaxel plus prednisone at [facility] and admitted several Dr. Adames.        Prostate cancer metastatic to bone    2/6/2018 Initial Diagnosis     Prostate cancer metastatic to bone              INTERVAL HISTORY  Patient ID: Jay Jones is a 77 y.o. year old male  Jay Jones is a 77 y.o.  year old male presentation to continue chemotherapy for his metastatic prostate cancer.  He complains of excessive fatigue after his chemotherapy.  He does have occasional left hip pain, left femur pain, and right scapula pain.  His last Lupron shot was in January 25 at [facility].  to be done every 6 months.  He does have bilateral lower estimated edema which is chronic.  This is secondary to his congestive heart failure.  He also has a history of bypass.  He takes Lasix and it will go down but he does not like to take Lasix all the time.  - had palpitation. His cardiologist will give him ambulatory monitor  -- tires the week after tx. S/p blood transfusion 3 weeks ago. Getting venofer.  --5/17/18: Therapy with docetaxol stopped due to rising PSA> pt deconditioned. Stopped water pill and was extremely short of breath. S/p diuresis since last week. Currently on 20mg of lasix. Allie to complain of tiredness and weakness. No appetite, has lost weight.  --5/31/18: pt admitted for 8days for afib and converted on his own. S/p thoracentesis for left pleural effusion. Pt started on eliquis and metoprolol  --At today' svisit, still complaining of dyspnea, lack of appetite. I rx miranol bu the was hallucinating on it. Wife will justo with the prescription he tried in the past.  Past Medical History:   Past Medical History:   Diagnosis Date   • Asthma     ASTHMA NOS W/ACUTE EXACERBATION   • CAD in native artery    • Cancer    • Coronary artery disease    • HLD (hyperlipidemia)     Hyperlipidemia, unspecified   • Infectious viral hepatitis     HEPATITIS A, VIRAL, W/O HEPATIC COMA   • Prostate CA 2/6/2018   • Prostate cancer metastatic to bone 2/6/2018   • Unspecified atrial flutter      Past Surgical History:   Past Surgical History:   Procedure Laterality Date   • APPENDECTOMY     • CARDIAC CATHETERIZATION Left 11/01/2006    -with findings of three-vessel coronary artery disease with normal left ventricular function.   •  CHOLECYSTECTOMY     • CORONARY ARTERY BYPASS GRAFT  11/03/2006    CABG times five with LIMA grafted in a sequential fashion to the first diagonal and left anterior descending followed by a saphenous vein graft to the first and second obtuse marginal branches and then an individual saphenous vein graft to the right coronary artery.   • OTHER SURGICAL HISTORY  02/28/2007    electrocardioversion   • PROSTATECTOMY       Social History:   Social History     Social History   • Marital status:      Spouse name: N/A   • Number of children: N/A   • Years of education: N/A     Occupational History   • Not on file.     Social History Main Topics   • Smoking status: Former Smoker     Types: Cigars     Quit date: 05/2017   • Smokeless tobacco: Never Used   • Alcohol use No   • Drug use: No   • Sexual activity: Defer     Other Topics Concern   • Not on file     Social History Narrative   • No narrative on file     Family History:   Family History   Problem Relation Age of Onset   • Heart disease Father    • Heart attack Father    • Heart failure Father    • Heart disease Maternal Grandfather    • Alzheimer's disease Mother    • Heart disease Other        Review of Systems   Constitutional: Negative.    HENT: Negative.    Eyes: Negative.    Respiratory: Negative.    Cardiovascular: Positive for palpitations.   Gastrointestinal: Negative.    Genitourinary: Negative.    Musculoskeletal:        Micha latonya    Skin: Negative.    Neurological: Negative.    Hematological: Negative.    Psychiatric/Behavioral: Negative.         Performance Status:  Symptomatic; fully ambulatory    Medications:    Current Outpatient Prescriptions   Medication Sig Dispense Refill   • apixaban (ELIQUIS) 5 MG tablet tablet Take 1 tablet by mouth Every 12 (Twelve) Hours. 180 tablet 3   • calcium carb-cholecalciferol (CALCIUM+D3) 600-800 MG-UNIT tablet Take 1 tablet by mouth Daily.     • digoxin (LANOXIN) 125 MCG tablet Take 1 tablet by mouth Daily. 90  "tablet 3   • estrogens, conjugated, (PREMARIN) 0.625 MG tablet Take 1 tablet by mouth Daily for 30 days. Take daily for 21 days then do not take for 7 days. 30 tablet 1   • furosemide (LASIX) 40 MG tablet TAKE 1 TABLET BY MOUTH x 5 days, then 1/2 tab daily  2   • KLOR-CON 10 MEQ CR tablet TAKE 1 TABLET DAILY  6   • metFORMIN XR (GLUCOPHAGE-XR) 500 MG 24 hr tablet TAKE 1 TABLET TWICE A DAY  3   • metoprolol tartrate (LOPRESSOR) 50 MG tablet Take 1 tablet by mouth Every 12 (Twelve) Hours. 180 tablet 3   • Multiple Vitamins-Minerals (CENTRUM SILVER PO) Take  by mouth Daily.     • predniSONE (DELTASONE) 5 MG tablet Take 5 mg by mouth 2 (Two) Times a Day With Meals.     • sennosides-docusate sodium (SENOKOT-S) 8.6-50 MG tablet Take 1 tablet by mouth 4 (Four) Times a Day.     • valsartan (DIOVAN) 40 MG tablet TAKE 1 TABLET EVERY DAY  3     No current facility-administered medications for this visit.        ALLERGIES:    Allergies   Allergen Reactions   • Oxycodone-Acetaminophen Unknown (See Comments)     Reaction: hallucinations   • Codeine Unknown (See Comments) and GI Intolerance   • Percocet [Oxycodone-Acetaminophen]    • Tramadol Other (See Comments)     Increased BP and HR. PATIENT STATES HE HAS BEEN TAKING THIS DRUG THIS MONTH.       Objective      Vitals:    05/31/18 0900   BP: 108/58   Pulse: 80   Resp: 16   Temp: 96.8 °F (36 °C)   TempSrc: Tympanic   SpO2: 94%   Weight: 62.5 kg (137 lb 11.2 oz)   Height: 177.8 cm (70\")         Current Status 5/31/2018   ECOG score 0         Physical Exam    General Appearance: Pale and frail appearing. Patient is awake, alert, oriented and in no acute distress. Patient is welldeveloped, wellnourished, and appears stated age.  HEENT: Normocephalic. Sclerae clear, conjunctiva pink, extraocular movements intact, pupils, round, reactive to light and  accommodation. Mouth and throat are clear with moist oral mucosa.  NECK: Supple, no jugular venous distention, thyroid not " enlarged.  LYMPH: No cervical, supraclavicular, axillary, or inguinal lymphadenopathy.  CHEST: Equal bilateral expansion, AP  diameter normal, resonant percussion note  LUNGS: Good air movement, no rales, rhonchi, rubs or wheezes with auscultation  CARDIO: Regular sinus rhythm, no murmurs, gallops or rubs.  ABDOMEN: Nondistended, soft, No tenderness, no guarding, no rebound, No hepatosplenomegaly. No abdominal masses. Bowel sounds positive. No hernia  GENITALIA: Not examined.  BREASTS: Not examined.  MUSKEL: No joint swelling, decreased motion, or inflammation  EXTREMS: nhan LE edema, No clubbing, cyanosis, No varicose veins.  NEURO: Grossly nonfocal, Gait is coordinated and smooth, Cognition is preserved.  SKIN: No rashes, no ecchymoses, no petechia.  PSYCH: Oriented to time, place and person. Memory is preserved. Mood and affect appear normal  RECENT LABS:  Admission on 05/19/2018, Discharged on 05/27/2018   No results displayed because visit has over 200 results.          RADIOLOGY:  Ct Chest W Contrast    Addendum Date: 5/10/2018 Addendum:   Addendum: The patient's outside CT exam from 11/16/2017 is now loaded on the PACS system and available for comparison.  The 12 mm anterior mediastinal lymph node positioned below the brachiocephalic vein is new a right internal mammary lymph node measuring 9 to 10 mm in short axis has increased in size from the outside study when it measured 6 mm. The pleural effusions are new.  The 3 mm right upper lobe nodule remains stable. A questionable 6 mm medial left lower lobe nodule is most likely a regional vessel and is similar to the previous exam. Nodularity along the right major fissure is again noted. A 3 to 4 mm nodule in the posterior right upper lobe on image 43 is not localized on the prior study, however this could relate to changes in slice selection. Continued follow-up recommended.  The sclerotic metastatic appearance to the regional bony marrow has increased since  11/16/2017. This report was finalized on 05/10/2018 14:49 by Dr. Nelida Knight MD.    Result Date: 5/10/2018  Narrative: EXAMINATION: CT CHEST W CONTRAST- 5/1/2018 12:39 PM CDT  HISTORY: Prostate cancer with progression  COMPARISON: None  DOSE: 292 mGy-cm  TECHNIQUE: Sequential imaging was performed from the thoracic inlet through the upper abdomen after the administration of IV contrast. Sagittal and coronal reformations were made from the original source data and reviewed. Automated exposure control was also utilized to decrease patient radiation dose.  FINDINGS: Several hypodensities are seen in the thyroid gland. The trachea and main bronchi are widely patent and in normal anatomic position. The esophagus is grossly normal in appearance.  There is no appreciable axillary lymphadenopathy. An anterior mediastinal lymph node is seen which measures 12 mm in short axis. Several additional smaller lymph nodes are seen throughout the mediastinum. There is no appreciable hilar lymphadenopathy.  The heart appears normal in size. Calcifications are seen within the LAD, left circumflex, and right coronary arteries. The thoracic aorta appears normal in caliber. Atherosclerotic ulcerations are seen within the aorta and its branch vessels. There is evidence of previous CABG. The main pulmonary artery appears normal in caliber.  There is a small right and moderate-sized left pleural effusion. There is associated compressive atelectasis. Respiratory motion limits evaluation for small nodules. There appears to be some thickening along the left major fissure without a discrete nodule identified. There is a questionable nodule in the left lower lobe measuring approximately 6 mm in size (axial image 83). A small nodule is seen posteriorly in the right upper lobe measuring approximately 3 mm in size (axial image 35). A small nodule is seen along the right major fissure measuring approximately 4 mm in size (axial image 108).   Please see the separate CT abdomen and pelvis report from the same day for findings in the upper abdomen.  Review of the visualized osseous structures demonstrates diffuse sclerotic metastases throughout all visualized osseous structures including the spine, ribs, sternum, clavicles, and scapulae. Degenerative changes are seen in the spine. There is mild anterior wedging of the T2 vertebral body.      Impression: 1. Small right moderate sized left pleural effusion with associated compressive atelectasis. 2. Tiny noncalcified pulmonary nodules bilaterally, below the threshold for PET/CT resolution, for which short interval follow-up is recommended. 3. Enlarged anterior mediastinal lymph node measures up to 12 mm in short axis. Metastatic disease cannot be excluded. 4. Diffuse bony metastases. 5. Atherosclerosis of the aorta and coronary arteries. 6. Please see the separate CT abdomen and pelvis report of same day for findings in the upper abdomen.  This report was finalized on 05/01/2018 12:46 by Dr. Vineet Robison MD.    Nm Bone Scan Whole Body    Addendum Date: 5/10/2018 Addendum:   Addendum: The outside nuclear medicine bone scan from 11/16/2017 is now available for comparison. Diffuse metastasis to the axial and appendicular skeleton is again visualized. Increasing tracer uptake is considered within the shoulders, humeri, and elbows. Increasing patchy activity also considered within the skull since the previous study. Mild progression of bony metastasis is favored. This report was finalized on 05/10/2018 15:04 by Dr. Nelida Knight MD.    Result Date: 5/10/2018  Narrative: EXAMINATION:  NM BONE SCAN WHOLE BODY-  5/1/2018 11:50 AM CDT  HISTORY: metastatic prostate ca assess for progression; Q33-Wbvmzimra neoplasm of prostate; C79.51-Secondary malignant neoplasm of bone  COMPARISON: Chest abdomen pelvis CT images performed today. Nuclear medicine bone scan dated 3/14/2013  TECHNIQUE:  20.6 mCi of technetium 99m  labeled HDP was administered intravenously. Anterior posterior whole body images were obtained.  FINDINGS:  There are extensive osseous metastases with areas of increased radiotracer activity throughout the axial skeleton including the spine ribs pelvic bones and calvarium.  There is increased tracer activity also noted in the upper extremities particularly humeri bilaterally with in the shoulder girdles, clavicles and scapula as well as the proximal femurs particularly on the left.  Diffuse blastic lesions appreciated also observed on the CT examinations.  There is excretion of the radionuclide by the urinary tract system.      Impression: 1. Extensive osseous metastases involving the axial and appendicular skeleton. This report was finalized on 05/01/2018 14:08 by Dr. Jules Lovett MD.    Us Chest    Result Date: 5/21/2018  Narrative: EXAMINATION: Ultrasound-guided thoracentesis  FINDINGS: The patient's chest radiograph is reviewed which demonstrates a large left-sided pleural effusion. Sonography is performed over the left hemithorax which demonstrates a a large left-sided pleural effusion which appears to be a simple effusion without evidence of internal septation or complexity.  There is some benefits of the procedure were discussed with the patient. A signed consent form was on the chart at the time of the procedure. Under aseptic conditions and using 1% Xylocaine for anesthesia I advanced a 6 Sami Yueh catheter into the left pleural space. I manually removed approximately 2 L of straw-colored fluid from the left pleural space without immediate complications. There was effusion remaining but I terminated the procedure at 2 L. The patient tolerated procedure well. Post thoracentesis chest radiograph demonstrates minimal residual effusion. There is a pneumonia within the lingular segment of the left upper lobe.      Impression: . 1. Left-sided thoracentesis performed with removal of 2 L of straw-colored fluid  from the left pleural space. There was residual effusion present but I did not remove any subsequent fluid secondary to the high volume. Post thoracentesis chest radiograph demonstrates minimal residual effusion. There is a infiltrate involving the lingular segment of the left upper lobe. This report was finalized on 05/21/2018 14:32 by Dr. Steven Nails MD.    Ct Abdomen Pelvis W Contrast    Addendum Date: 5/10/2018 Addendum:   Addendum: Patient's outside CT exam from 11/16/2017 is now available for review.  The left inguinal lymph nodes have increased in size. The largest lymph node measures 1.8 cm in short axis, an increased from the previous 7 mm measurement. There are increasing sclerotic lesions of the lumbar spine with new mild compression of the superior endplate of L3 vertebra. This report was finalized on 05/10/2018 15:00 by Dr. Nelida Knight MD.    Result Date: 5/10/2018  Narrative: EXAMINATION: CT ABDOMEN PELVIS W CONTRAST- 5/1/2018 1:19 PM CDT  HISTORY: Prostate cancer with progression  COMPARISON: CT abdomen and pelvis with and without contrast 3/14/2013  DOSE: 249 mGy-cm  TECHNIQUE: Sequential imaging was performed from the lung bases through the pubic symphysis after the administration of IV contrast.  Sagittal and coronal reformations were made from the original source data and reviewed. Automated exposure control was also utilized to decrease patient radiation dose.  FINDINGS: Please see the separate CT chest report from the same day for findings in the lung bases.  Dual phase imaging of the liver was performed, demonstrating no focal liver lesions. The gallbladder is surgically absent. The spleen, pancreas, and adrenal glands are grossly normal in appearance. Small bilateral renal cysts are present, both slightly increased in size compared to the previous exam.  The main portal and splenic veins and IVC appear grossly normal. The abdominal aorta appears normal in caliber without evidence of  aneurysm or dissection. The origins of the celiac axis, superior mesenteric artery, and inferior mesenteric artery enhance normally. There are scattered atherosclerotic calcifications of the aorta and its branch vessels.  No central mesenteric or retroperitoneal lymphadenopathy is appreciated.  Apparent gastric wall thickening is felt to be related to underdistention. The stomach and small bowel appear normal in caliber. There are a few scattered colonic diverticula without evidence of diverticulitis. No free air or free fluid is seen in the abdomen.  The urinary bladder is nearly completely decompressed and not well evaluated. Mild circumferential urinary bladder wall thickening is noted. There is mild inflammatory stranding in the presacral space, possibly related to treatment changes. Several pathologically enlarged left inguinal lymph nodes are seen which measure up to 15 mm in short axis.  Review of the visualized osseous structures demonstrates diffuse sclerotic metastases throughout the visualized spine and pelvis. There is minimal cortical irregularity in the left iliac wing with an adjacent soft tissue component.         Impression: 1. Diffuse bony metastases. There is cortical disruption in the left iliac wing with an adjacent soft tissue component concerning for extension of metastatic disease into the adjacent soft tissues. 2. Enlarged left inguinal lymph nodes are concerning for metastatic lymphadenopathy. 3. Mild presacral inflammation may be related to interval treatment therapy. 4. Atherosclerotic disease. 5. Please see the separate CT chest report from the same day for findings in the lung bases.    This report was finalized on 05/01/2018 13:35 by Dr. Vineet Robison MD.    Xr Chest 1 View    Result Date: 5/23/2018  Narrative: Exam:   XR CHEST 1 VW-   Date:  5/23/2018  History:  Male, age  77 years;s/p thoracentesis  COMPARISON:  Chest x-ray dated 5/22/2018  Findings :  The heart and mediastinum are  similar in size. Small-to-moderate left pleural effusion, decreased from 5/22/2018. There is no measurable pneumothorax. Right pleural effusion and associated opacity, also decreased.  The bones show no acute pathology.       Impression: Impression:  1.  Decreased left pleural effusion and opacity. 2.  Trace right pleural effusion, also decreased.  This report was finalized on 05/23/2018 08:30 by Dr. Amparo Pearce MD.    Xr Chest 1 View    Result Date: 5/22/2018  Narrative: XR CHEST 1 VW- 5/22/2018 1:22 PM CDT  HISTORY: s/p thoracentesis   COMPARISON: 5/21/2018.  FINDINGS: There is no pneumothorax. Airspace opacities in the LEFT lung have increased since the previous study and likely represent worsening pneumonia. The cardiomediastinal silhouette and pulmonary vascularity are unchanged.  The osseous structures and surrounding soft tissues demonstrate no acute abnormality.      Impression: 1. Worsening LEFT lung pneumonia. 2. No pneumothorax.   This report was finalized on 05/22/2018 14:00 by Dr. Nura Allen MD.    Xr Chest 1 View    Result Date: 5/21/2018  Narrative: EXAMINATION: Chest 1 view 5/21/2018  HISTORY: Pleural effusion postthoracentesis  FINDINGS: Today's exam is compared to previous study of one day earlier. The patient's undergone left-sided thoracentesis with no evidence of complication. There is a left basilar pneumonia suspected to be in the lingular segment of the left upper lobe. There is no pneumothorax. The right lung is fully expanded and clear.      Impression: . Minimal residual sided effusion postthoracentesis. There is a a pneumonia within the left base suspected to involve the lingular segment of the left upper lobe. This report was finalized on 05/21/2018 14:34 by Dr. Steven Nails MD.    Us Thoracentesis    Result Date: 5/21/2018  Narrative: EXAMINATION: Ultrasound-guided thoracentesis  FINDINGS: The patient's chest radiograph is reviewed which demonstrates a large left-sided  pleural effusion. Sonography is performed over the left hemithorax which demonstrates a a large left-sided pleural effusion which appears to be a simple effusion without evidence of internal septation or complexity.  There is some benefits of the procedure were discussed with the patient. A signed consent form was on the chart at the time of the procedure. Under aseptic conditions and using 1% Xylocaine for anesthesia I advanced a 6 Danish Yueh catheter into the left pleural space. I manually removed approximately 2 L of straw-colored fluid from the left pleural space without immediate complications. There was effusion remaining but I terminated the procedure at 2 L. The patient tolerated procedure well. Post thoracentesis chest radiograph demonstrates minimal residual effusion. There is a pneumonia within the lingular segment of the left upper lobe.      Impression: . 1. Left-sided thoracentesis performed with removal of 2 L of straw-colored fluid from the left pleural space. There was residual effusion present but I did not remove any subsequent fluid secondary to the high volume. Post thoracentesis chest radiograph demonstrates minimal residual effusion. There is a infiltrate involving the lingular segment of the left upper lobe. This report was finalized on 05/21/2018 14:32 by Dr. Steven Nails MD.    Xr Chest Pa & Lateral    Result Date: 5/20/2018  Narrative: Exam:   XR CHEST PA AND LATERAL-   Date:  5/20/2018  History:  Male, age  77 years;pleural effusion  COMPARISON:  The chest dated 5/1/2018  Findings :  The left heart border is indistinct. There is a moderate left pleural effusion. Median sternotomy wires appear similar. Trace right pleural effusion. No acute osseous pathology. No measurable pneumothorax.    Impression: Impression:  Bilateral pleural effusions, moderate on the left and trace on the right.  This report was finalized on 05/20/2018 10:29 by Dr. Amparo Pearce MD.           Assessment/Plan   Jay Jones is a 77 y.o. year old male     Patient Active Problem List   Diagnosis   • HLD (hyperlipidemia)   • CAD (coronary artery disease)   • Atrial flutter   • Prostate cancer metastatic to bone   • Antineoplastic chemotherapy induced anemia   • Generalized edema      Assessment/Plan 77-year-old man with metastatic prostate cancer mainly bone last treated on Docetaxol plus prednisone now with rising psa.    1.  Prostate cancer: Bone scan and CTs showed known numerous bone metastasis and some lymphadenopathy. We will have it compared to prior.  --had detailed discussion with pt and family(wife and other relative). Pt is refractory on multiple regimen. Chances of response is greatly diminished. I discussed cabazitaxel vs ZEE. Given that he is deconditioned, I.e. Fluid overloaded, weak, dyspneic, I want to maximize him first because I  Don think he can tolerate cabazitaxel now. Will diurese and start premarin 0.625 mg daily(3weeks on 1 week off). rtc in 1-2 weeks to re-eval  -- We will continue Lupron shot every 6 months.    2.  Bone metastases: will start xgeva.  Continue calcium.    3.  Congestive heart failure:  Continue Diovan, furosemide. Pt fluid overloaded with nhan edema up to his knees. Will diures    4.  Diabetes mellitus continue metformin.  5.  Anemia: We will check iron panel B12 folate.  6.  Hyperlipidemia continue Crestor.  7. Anemia: s/p pRBC. Hg of 10.8.  8. BLEE:On 20mg of lasix daily. Also take potassium pill daily.   9.FEN: pt with no appetite, weight loss and no energy. Given on primarin and risk of blood clot with remeron and estrogen being high, will treat with marinol per NCCN guidneal for palliative care in cancer pts.  10. Dspnea: DALE will refer for home o2.  11: Afib: on eliquis, metoprolol          Prietoiageli Brian Ojeda MD    5/31/2018    9:57 AM

## 2018-06-04 NOTE — TELEPHONE ENCOUNTER
Received call from patients wife, she called requesting a order for Home Health. Original order is void due patient having to be placed in the hospital at his initial in home visit and this order is no longer in effect. New order will be placed by Dr Ojeda when she returns on Wed. She v/u of our conversation.

## 2018-06-08 PROBLEM — E86.0 DEHYDRATION: Status: ACTIVE | Noted: 2018-01-01

## 2018-06-08 PROBLEM — C61 PROSTATE CANCER (HCC): Status: ACTIVE | Noted: 2018-01-01

## 2018-06-08 PROBLEM — D69.6 THROMBOCYTOPENIA (HCC): Status: ACTIVE | Noted: 2018-01-01

## 2018-06-08 PROBLEM — R47.01 APHASIA: Status: ACTIVE | Noted: 2018-01-01

## 2018-06-08 PROBLEM — G93.89 BRAIN MASS: Status: ACTIVE | Noted: 2018-01-01

## 2018-06-08 PROBLEM — R13.10 DYSPHAGIA: Status: ACTIVE | Noted: 2018-01-01

## 2018-06-09 NOTE — PROGRESS NOTES
[facility] Medicine Services  INPATIENT PROGRESS NOTE    Length of Stay: 1  Date of Admission: 6/8/2018  Primary Care Physician: Hector Garcia MD PhD    Subjective     Chief Complaint:     Aphasia, end-of-life care    HPI     The patient was seen with his wife and son present.  I had a very long discussion with the patient's family about end-of-life care.  They would like to speak with the hospice representative and with the  in order to determine what services are available to them prior to moving to comfort measures.  Until they have that discussion and make a final decision about disposition, they would like to transition the patient to a conditional CODE STATUS.      Review of Systems     All pertinent negatives and positives are as above. All other systems have been reviewed and are negative unless otherwise stated.     Objective    Temp:  [97 °F (36.1 °C)-99.4 °F (37.4 °C)] 98.9 °F (37.2 °C)  Heart Rate:  [100-113] 104  Resp:  [10-18] 10  BP: ()/(40-47) 99/42    Lab Results (last 24 hours)     Procedure Component Value Units Date/Time    POC Glucose Once [[id number]]  (Abnormal) Collected:  06/09/18 0858    Specimen:  Blood Updated:  06/09/18 0909     Glucose 158 (H) mg/dL      Comment: : [id number] Fadia Razo ID: [id number]       POC Glucose Once [[id number]]  (Abnormal) Collected:  06/08/18 2347    Specimen:  Blood Updated:  06/08/18 2358     Glucose 147 (H) mg/dL      Comment: : [id number] Christiansondinh Gore ID: [id number]       Lactate Dehydrogenase [[id number]]  (Abnormal) Collected:  06/08/18 2241    Specimen:  Blood Updated:  06/08/18 2347     LDH 5,614 (H) U/L     CBC Auto Differential [[id number]]  (Abnormal) Collected:  06/08/18 2241    Specimen:  Blood Updated:  06/08/18 2324     WBC 7.05 10*3/mm3      RBC 2.71 (L) 10*6/mm3      Hemoglobin 8.1 (L) g/dL      Hematocrit 26.5 (L) %      MCV 97.8 (H) fL      MCH 29.9 pg       MCHC 30.6 (L) g/dL      RDW 21.6 (H) %      RDW-SD 76.1 (H) fl      MPV 9.7 fL      Platelets 83 (L) 10*3/mm3     Manual Differential [[id number]]  (Abnormal) Collected:  06/08/18 2241    Specimen:  Blood Updated:  06/08/18 2324     Neutrophil % 69.5 %      Lymphocyte % 15.8 %      Monocyte % 6.3 %      Bands %  8.4 %      Neutrophils Absolute 5.49 10*3/mm3      Lymphocytes Absolute 1.11 10*3/mm3      Monocytes Absolute 0.44 10*3/mm3      nRBC 6.3 (H) /100 WBC      Anisocytosis Slight/1+     Dacrocytes Slight/1+     Microcytes Slight/1+     Ovalocytes Slight/1+     Poikilocytes Slight/1+     Polychromasia Slight/1+     WBC Morphology Normal     Platelet Estimate Decreased    Comprehensive Metabolic Panel [[id number]]  (Abnormal) Collected:  06/08/18 2241    Specimen:  Blood Updated:  06/08/18 2319     Glucose 129 (H) mg/dL      BUN 27 (H) mg/dL      Creatinine 0.45 (L) mg/dL      Sodium 137 mmol/L      Potassium 4.7 mmol/L      Chloride 102 mmol/L      CO2 27.0 mmol/L      Calcium 6.6 (L) mg/dL      Total Protein 4.7 (L) g/dL      Albumin 2.50 (L) g/dL      ALT (SGPT) 25 U/L      AST (SGOT) 126 (H) U/L      Alkaline Phosphatase 639 (H) U/L      Total Bilirubin 0.5 mg/dL      eGFR Non African Amer >150 mL/min/1.73      Globulin 2.2 gm/dL      A/G Ratio 1.1 g/dL      BUN/Creatinine Ratio 60.0 (H)     Anion Gap 8.0 mmol/L     Narrative:       The MDRD GFR formula is only valid for adults with stable renal function between ages 18 and 70.    Blood Gas, Arterial With Co-Ox [[id number]]  (Abnormal) Collected:  06/08/18 2310    Specimen:  Arterial Blood Updated:  06/08/18 2313     Site Right Brachial     Lino's Test Positive     pH, Arterial 7.479 (H) pH units      pCO2, Arterial 31.0 (L) mm Hg      pO2, Arterial 66.7 (L) mm Hg      HCO3, Arterial 23.0 mmol/L      Base Excess, Arterial -0.3 (L) mmol/L      O2 Saturation, Arterial 94.9 %      Hemoglobin, Blood Gas 7.7 (L) g/dL      Hematocrit, Blood Gas 23.5 (L) %       Oxyhemoglobin 92.2 (L) %      Methemoglobin 0.90 %      Carboxyhemoglobin 2.0 %      Temperature 37.0 C      Sodium, Arterial 136 mmol/L      Potassium, Arterial 4.0 mmol/L      Ionized Calcium 3.77 (L) mg/dL      Barometric Pressure for Blood Gas 752 mmHg      Modality N/A     FIO2 21 %      Ventilator Mode NA     Collected by [id number]     pH, Temp Corrected -- pH Units      pCO2, Temperature Corrected -- mm Hg      pO2, Temperature Corrected -- mm Hg     Digoxin Level [[id number]]  (Abnormal) Collected:  06/08/18 2241    Specimen:  Blood Updated:  06/08/18 2311     Digoxin 0.70 (L) ng/mL     Lactic Acid, Plasma [[id number]]  (Normal) Collected:  06/08/18 2241    Specimen:  Blood Updated:  06/08/18 2311     Lactate 1.3 mmol/L           Imaging Results (last 24 hours)     ** No results found for the last 24 hours. **             Intake/Output Summary (Last 24 hours) at 06/09/18 1148  Last data filed at 06/09/18 1143   Gross per 24 hour   Intake              950 ml   Output              500 ml   Net              450 ml       Physical Exam   Constitutional: He appears well-developed. He appears cachectic. He is easily aroused. He has a sickly appearance. He appears ill. No distress.   HENT:   Head: Normocephalic and atraumatic.   Nose: Nose normal.   Mouth/Throat: Oropharynx is clear and moist. Mucous membranes are pale and dry.   Eyes: Conjunctivae are normal. Scleral icterus is present.   Neck: No JVD present. No tracheal deviation present. No thyromegaly present.   Cardiovascular: Regular rhythm and normal heart sounds.  Tachycardia present.    Pulmonary/Chest: Effort normal and breath sounds normal. No respiratory distress.   Abdominal: Soft. Bowel sounds are normal. He exhibits no distension. There is no tenderness.   Musculoskeletal: He exhibits no edema.   Neurological: He is easily aroused.   Skin: Skin is dry. No rash noted.     Results Review:  I have reviewed the labs, radiology results, and diagnostic studies  since my last progress note and made treatment changes reflective of the results.   I have reviewed the current medications.    Assessment/Plan     Hospital Problem List     Prostate cancer metastatic to bone    Brain mass    Aphasia    Dysphagia    Dehydration    Thrombocytopenia    Prostate cancer          PLAN:   consultation to discuss hospice at home as well as the possibility of SNF admission for comfort measures care.  The family is considering the above options now and will let us know what the final decision is.      Angel Guzman,    06/09/18   11:48 AM

## 2018-06-09 NOTE — PLAN OF CARE
Problem: Patient Care Overview  Goal: Plan of Care Review   06/09/18 0510   Coping/Psychosocial   Plan of Care Reviewed With patient;spouse;daughter   Plan of Care Review   Progress no change   OTHER   Outcome Summary NPO. Speech eval this am. New order for Dilaudid IV with good results for pain. Patient has hx of bone metastasis and a new brain tumor. Aphasic. No skin breakdown noted. Incontinent of urine. Safety maintained. Family at bedside.Hospice consult for today.

## 2018-06-09 NOTE — CONSULTS
[facility] Medicine Consult Note    Date of Admission: 6/8/2018  Date of Consult: 06/08/18    Primary Care Physician: Hector Garcia MD PhD  Referring Physician: Taj Anderson MD    Chief complaint/Reason for consultation: Management of failure to thrive/dehydration    History of Present Illness  Jay Jones is an unfortunate 77-year-old  male with a past medical history of metastatic prostate cancer status post prostatectomy.  Patient's wife and daughter at bedside states he has been declining over the past 3-4 months significantly so in the past month with severe weight loss, anorexia and general decline.  Wife states he was in his usual state of health last evening was talking coherently and behaving normally and awakened this morning with inability to find his words, increased agitation therefore they did seek evaluation via EMS at [facility].  Evaluation revealed brain lesion therefore patient was transferred to [facility] into the care of Dr. Anderson, who feels patient may have sphenoid wing meningioma versus metastatic prostate cancer to the brain.  Patient has been on estrogen for several months and this is a possible etiology of meningioma as metastatic prostate cancer to the brain is rare.  Invasive procedure is not an option and he has been placed on high-dose steroids and Keppra.  Hospitalists have been consulted for general management of failure to thrive and dehydration.  Wife wished to discuss hospice care, approximately 30 minutes spent in conversation with her and daughter at bedside.  Currently patient is extremely agitated when touched by anyone other than family.  He is unable to follow any commands.  Speech is garbled.  Condition is guarded.  Family wishes for his agitation and pain to be treated.  I did explain we would have to be very cautious with the use of narcotics due to concerns for oversedation.  They verbalized  understanding and wish at this time for him to remain a full code until all family members have discussed with hospice care but would like mild therapy if possible.  Hospitalists will follow.    Review of Systems   Unable to determine due to profound confusion    Past Medical History:   Past Medical History:   Diagnosis Date   • Asthma     ASTHMA NOS W/ACUTE EXACERBATION   • CAD in native artery    • Cancer    • Coronary artery disease    • HLD (hyperlipidemia)     Hyperlipidemia, unspecified   • Infectious viral hepatitis     HEPATITIS A, VIRAL, W/O HEPATIC COMA   • Prostate CA 2/6/2018   • Prostate cancer metastatic to bone 2/6/2018   • Unspecified atrial flutter        Past Surgical History:   Past Surgical History:   Procedure Laterality Date   • APPENDECTOMY     • CARDIAC CATHETERIZATION Left 11/01/2006    -with findings of three-vessel coronary artery disease with normal left ventricular function.   • CHOLECYSTECTOMY     • CORONARY ARTERY BYPASS GRAFT  11/03/2006    CABG times five with LIMA grafted in a sequential fashion to the first diagonal and left anterior descending followed by a saphenous vein graft to the first and second obtuse marginal branches and then an individual saphenous vein graft to the right coronary artery.   • OTHER SURGICAL HISTORY  02/28/2007    electrocardioversion   • PROSTATECTOMY         Code Status: Full, his wife speaks for him    Family History: family history includes Alzheimer's disease in his mother; Heart attack in his father; Heart disease in his father, maternal grandfather, and other; Heart failure in his father.    Social History:  reports that he quit smoking about 13 months ago. His smoking use included Cigars. He has never used smokeless tobacco. He reports that he does not drink alcohol or use drugs.    Allergies:   Allergies   Allergen Reactions   • Oxycodone-Acetaminophen Unknown (See Comments)     Reaction: hallucinations   • Codeine Unknown (See Comments) and GI  Intolerance   • Percocet [Oxycodone-Acetaminophen]    • Tramadol Other (See Comments)     Increased BP and HR. PATIENT STATES HE HAS BEEN TAKING THIS DRUG THIS MONTH.       Home Medications:   Prior to Admission medications    Medication Sig Start Date End Date Taking? Authorizing Provider   apixaban (ELIQUIS) 5 MG tablet tablet Take 1 tablet by mouth Every 12 (Twelve) Hours. 5/27/18  Yes Khalida Pillai PA-C   calcium carb-cholecalciferol (CALCIUM+D3) 600-800 MG-UNIT tablet Take 1 tablet by mouth Daily.   Yes Historical Provider, MD   digoxin (LANOXIN) 125 MCG tablet Take 1 tablet by mouth Daily.  Patient taking differently: Take 125 mcg by mouth Daily With Lunch. 5/27/18  Yes Khalida Pillai PA-C   estrogens, conjugated, (PREMARIN) 0.625 MG tablet Take 1 tablet by mouth Daily for 30 days. Take daily for 21 days then do not take for 7 days.  Patient taking differently: Take 0.625 mg by mouth every night at bedtime. Take daily for 21 days then do not take for 7 days. 5/10/18 6/9/18 Yes Ash Ojeda MD   furosemide (LASIX) 20 MG tablet Take 20 mg by mouth Daily.   Yes Historical Provider, MD   metFORMIN ER (GLUCOPHAGE-XR) 500 MG 24 hr tablet Take 500 mg by mouth Daily With Breakfast.   Yes Historical Provider, MD   metoprolol tartrate (LOPRESSOR) 50 MG tablet Take 1 tablet by mouth Every 12 (Twelve) Hours. 5/27/18  Yes Khalida Pillai PA-C   Multiple Vitamins-Minerals (CENTRUM SILVER PO) Take 1 tablet by mouth Daily.   Yes Historical Provider, MD   potassium chloride (K-DUR) 10 MEQ CR tablet Take 10 mEq by mouth Daily With Lunch.   Yes Historical Provider, MD   predniSONE (DELTASONE) 5 MG tablet Take 5 mg by mouth 2 (Two) Times a Day With Meals.   Yes Historical Provider, MD   sennosides-docusate sodium (SENOKOT-S) 8.6-50 MG tablet Take 2 tablets by mouth 2 (Two) Times a Day.   Yes Historical Provider, MD   valsartan (DIOVAN) 40 MG tablet Take 20 mg by mouth Daily With Lunch. Half tablet -20mg daily at  1200   Yes Historical Provider, MD   furosemide (LASIX) 40 MG tablet TAKE 1 TABLET BY MOUTH x 5 days, then 1/2 tab daily 11/8/16 6/8/18  Historical Provider, MD   KLOR-CON 10 MEQ CR tablet TAKE 1 TABLET DAILY 11/3/16 6/8/18  Historical Provider, MD   metFORMIN XR (GLUCOPHAGE-XR) 500 MG 24 hr tablet TAKE 1 TABLET TWICE A DAY 10/20/16 6/8/18  Historical Provider, MD   valsartan (DIOVAN) 40 MG tablet TAKE 1 TABLET EVERY DAY 11/8/16 6/8/18  Historical Provider, MD       Scheduled Medications    apixaban 5 mg Oral Q12H   [START ON 6/9/2018] calcium carb-cholecalciferol 1 tablet Oral Daily   dexamethasone 10 mg Intravenous Once   Followed by      [START ON 6/9/2018] dexamethasone 4 mg Intravenous Q6H   Followed by      [START ON 6/12/2018] dexamethasone 2 mg Intravenous Q6H   Followed by      [START ON 6/15/2018] dexamethasone 2 mg Intravenous Q12H   [START ON 6/9/2018] digoxin 125 mcg Oral Daily With Lunch   [START ON 6/9/2018] estrogens (conjugated) 0.625 mg Oral Daily   [START ON 6/9/2018] famotidine 40 mg Oral Daily   [START ON 6/9/2018] furosemide 20 mg Oral Daily   insulin lispro 2-7 Units Subcutaneous 4x Daily With Meals & Nightly   levETIRAcetam 500 mg Intravenous Q12H   [START ON 6/9/2018] metFORMIN  mg Oral Daily With Breakfast   metoprolol tartrate 50 mg Oral Q12H   [START ON 6/9/2018] potassium chloride 10 mEq Oral Daily With Lunch   sennosides-docusate sodium 2 tablet Oral BID   [START ON 6/9/2018] valsartan 20 mg Oral Daily With Lunch       Pertinent Data:   Lab Results (last 72 hours)     Procedure Component Value Units Date/Time    Comprehensive Metabolic Panel [[id number]]  (Abnormal) Collected:  06/08/18 2241    Specimen:  Blood Updated:  06/08/18 2319     Glucose 129 (H) mg/dL      BUN 27 (H) mg/dL      Creatinine 0.45 (L) mg/dL      Sodium 137 mmol/L      Potassium 4.7 mmol/L      Chloride 102 mmol/L      CO2 27.0 mmol/L      Calcium 6.6 (L) mg/dL      Total Protein 4.7 (L) g/dL      Albumin 2.50  (L) g/dL      ALT (SGPT) 25 U/L      AST (SGOT) 126 (H) U/L      Alkaline Phosphatase 639 (H) U/L      Total Bilirubin 0.5 mg/dL      eGFR Non African Amer >150 mL/min/1.73      Globulin 2.2 gm/dL      A/G Ratio 1.1 g/dL      BUN/Creatinine Ratio 60.0 (H)     Anion Gap 8.0 mmol/L     Narrative:       The MDRD GFR formula is only valid for adults with stable renal function between ages 18 and 70.    Blood Gas, Arterial With Co-Ox [[id number]]  (Abnormal) Collected:  06/08/18 2310    Specimen:  Arterial Blood Updated:  06/08/18 2313     Site Right Brachial     Lino's Test Positive     pH, Arterial 7.479 (H) pH units      pCO2, Arterial 31.0 (L) mm Hg      pO2, Arterial 66.7 (L) mm Hg      HCO3, Arterial 23.0 mmol/L      Base Excess, Arterial -0.3 (L) mmol/L      O2 Saturation, Arterial 94.9 %      Hemoglobin, Blood Gas 7.7 (L) g/dL      Hematocrit, Blood Gas 23.5 (L) %      Oxyhemoglobin 92.2 (L) %      Methemoglobin 0.90 %      Carboxyhemoglobin 2.0 %      Temperature 37.0 C      Sodium, Arterial 136 mmol/L      Potassium, Arterial 4.0 mmol/L      Ionized Calcium 3.77 (L) mg/dL      Barometric Pressure for Blood Gas 752 mmHg      Modality N/A     FIO2 21 %      Ventilator Mode NA     Collected by [id number]     pH, Temp Corrected -- pH Units      pCO2, Temperature Corrected -- mm Hg      pO2, Temperature Corrected -- mm Hg     Digoxin Level [[id number]]  (Abnormal) Collected:  06/08/18 2241    Specimen:  Blood Updated:  06/08/18 2311     Digoxin 0.70 (L) ng/mL     Lactic Acid, Plasma [[id number]]  (Normal) Collected:  06/08/18 2241    Specimen:  Blood Updated:  06/08/18 2311     Lactate 1.3 mmol/L     CBC Auto Differential [[id number]]  (Abnormal) Collected:  06/08/18 2241    Specimen:  Blood Updated:  06/08/18 2307     WBC 7.05 10*3/mm3      RBC 2.71 (L) 10*6/mm3      Hemoglobin 8.1 (L) g/dL      Hematocrit 26.5 (L) %      MCV 97.8 (H) fL      MCH 29.9 pg      MCHC 30.6 (L) g/dL      RDW 21.6 (H) %      RDW-SD 76.1 (H) fl       MPV 9.7 fL      Platelets 83 (L) 10*3/mm3     Lactate Dehydrogenase [[id number]] Collected:  06/08/18 2241    Specimen:  Blood Updated:  06/08/18 2300    Manual Differential [[id number]] Collected:  06/08/18 2241    Specimen:  Blood Updated:  06/08/18 2257        Imaging Results (last 24 hours)     ** No results found for the last 24 hours. **        Temp:  [99.4 °F (37.4 °C)] 99.4 °F (37.4 °C)  Heart Rate:  [101] 101  Resp:  [18] 18  BP: (105)/(47) 105/47    Physical Exam   Constitutional: He appears well-developed. He appears distressed.   Frail and cachectic   HENT:   Head: Normocephalic and atraumatic.   Dry oral mucosa   Eyes: Pupils are equal, round, and reactive to light. Scleral icterus is present.   Neck: Normal range of motion. No JVD present.   Cardiovascular: Regular rhythm and normal heart sounds.    No murmur heard.  Tachycardic   Pulmonary/Chest: Effort normal and breath sounds normal. No respiratory distress. He has no wheezes. He has no rales.   Abdominal: Soft. Bowel sounds are normal. He exhibits no distension.   Musculoskeletal: He exhibits no edema.   Generalized weakness and debility   Neurological:   Confused, aphasic   Skin: Skin is warm and dry. Capillary refill takes less than 2 seconds.   Jaundice   Psychiatric:   Extreme agitation       Assessment:     Hospital Problem List     Prostate cancer metastatic to bone    Brain mass    Aphasia    Dysphagia    Dehydration    Thrombocytopenia    Prostate cancer      Transaminitis - concerns for liver metastases  Failure to thrive  End-of-life      Plan:   1.  Consult hospice in a.m.   2.  Add Ativan-low dose every 6 hours as needed for agitation  3.  Pain control with Dilaudid, per admitting  4.  Normal saline 20 mEq KCl 100 ML/hour  5.  Nasal cannula O2 at 2 L if patient will tolerate    I discussed the patients findings and my recommendations with: Shauna Hernandez MD  Time spent: 50 minutes    DONALD Esteban  06/08/18  10:27  PM

## 2018-06-09 NOTE — CONSULTS
[facility]    HEMATOLOGY & ONCOLOGY      CONSULTATION NOTE      REASON FOR CONSULT: Prostate ca metastatic with new cranial mass  REFERRING PHYSICIAN: Taj Anderson,*    ADMISSION DIAGNOSIS  Prostate cancer [C61]      Subjective     HISTORY OF PRESENT ILLNESS:     Pt with prostate ca presented to this admission with AMS, imaging showed cranial mass concerning fr meningioma versus metastasis. On steroid, keppra and IVF. Family at bed side. Patient alert to name. Family reports this is improvement since admission.  Past Medical History:   Past Medical History:   Diagnosis Date   • Asthma     ASTHMA NOS W/ACUTE EXACERBATION   • CAD in native artery    • Cancer    • Coronary artery disease    • HLD (hyperlipidemia)     Hyperlipidemia, unspecified   • Infectious viral hepatitis     HEPATITIS A, VIRAL, W/O HEPATIC COMA   • Prostate CA 2/6/2018   • Prostate cancer metastatic to bone 2/6/2018   • Unspecified atrial flutter      Past Surgical History:   Past Surgical History:   Procedure Laterality Date   • APPENDECTOMY     • CARDIAC CATHETERIZATION Left 11/01/2006    -with findings of three-vessel coronary artery disease with normal left ventricular function.   • CHOLECYSTECTOMY     • CORONARY ARTERY BYPASS GRAFT  11/03/2006    CABG times five with LIMA grafted in a sequential fashion to the first diagonal and left anterior descending followed by a saphenous vein graft to the first and second obtuse marginal branches and then an individual saphenous vein graft to the right coronary artery.   • OTHER SURGICAL HISTORY  02/28/2007    electrocardioversion   • PROSTATECTOMY       Social History:   Social History     Social History   • Marital status:      Spouse name: N/A   • Number of children: N/A   • Years of education: N/A     Occupational History   • Not on file.     Social History Main Topics   • Smoking status: Former Smoker     Types: Cigars     Quit date: 05/2017   • Smokeless tobacco:  Never Used   • Alcohol use No   • Drug use: No   • Sexual activity: Defer     Other Topics Concern   • Not on file     Social History Narrative   • No narrative on file     Family History:   Family History   Problem Relation Age of Onset   • Heart disease Father    • Heart attack Father    • Heart failure Father    • Heart disease Maternal Grandfather    • Alzheimer's disease Mother    • Heart disease Other        Review of Systems   Constitutional: Positive for activity change, fatigue and unexpected weight change. Negative for fever.   HENT: Negative.    Eyes: Negative.    Respiratory: Positive for shortness of breath. Negative for cough, chest tightness and wheezing.    Cardiovascular: Positive for leg swelling. Negative for chest pain.   Gastrointestinal: Negative.    Endocrine: Negative.    Genitourinary: Negative.    Musculoskeletal: Positive for arthralgias.   Skin: Positive for pallor. Negative for rash.   Neurological: Negative for dizziness, facial asymmetry and light-headedness.   Hematological: Negative.    Psychiatric/Behavioral: Negative for agitation and behavioral problems.        Medications:    Current Facility-Administered Medications   Medication Dose Route Frequency Provider Last Rate Last Dose   • apixaban (ELIQUIS) tablet 5 mg  5 mg Oral Q12H Taj Anderson MD       • calcium carb-cholecalciferol 600-800 MG-UNIT tablet 1 tablet  1 tablet Oral Daily Taj Anderson MD       • dexamethasone (DECADRON) injection 4 mg  4 mg Intravenous Q6H Taj Anderson MD   4 mg at 06/09/18 0458    Followed by   • [START ON 6/12/2018] dexamethasone (DECADRON) injection 2 mg  2 mg Intravenous Q6H Taj Anderson MD        Followed by   • [START ON 6/15/2018] dexamethasone (DECADRON) injection 2 mg  2 mg Intravenous Q12H Taj Anderson MD       • dextrose (D50W) solution 25 g  25 g Intravenous Q15 Min PRN Taj Anderson MD       • dextrose (GLUTOSE) oral gel 15 g  15 g  Oral Q15 Min PRN Taj Anderson MD       • digoxin (LANOXIN) tablet 125 mcg  125 mcg Oral Daily With Lunch Taj Anderson MD       • famotidine (PEPCID) tablet 40 mg  40 mg Oral Daily Taj Anderson MD       • furosemide (LASIX) tablet 20 mg  20 mg Oral Daily Taj Anderson MD       • glucagon (human recombinant) (GLUCAGEN DIAGNOSTIC) injection 1 mg  1 mg Subcutaneous PRN Taj Anderson MD       • HYDROmorphone (DILAUDID) injection 0.5 mg  0.5 mg Intravenous Q3H PRN Taj Anderson MD   0.5 mg at 06/09/18 0650   • insulin lispro (humaLOG) injection 2-7 Units  2-7 Units Subcutaneous 4x Daily With Meals & Nightly Taj Anderson MD       • levETIRAcetam in NaCl 0.82% (KEPPRA) IVPB 500 mg  500 mg Intravenous Q12H Taj Anderson MD   500 mg at 06/09/18 0859   • LORazepam (ATIVAN) injection 0.25 mg  0.25 mg Intravenous Q6H PRN DONALD Saldaña   0.25 mg at 06/08/18 2350   • metFORMIN ER (GLUCOPHAGE-XR) 24 hr tablet 500 mg  500 mg Oral Daily With Breakfast Taj Anderson MD       • metoprolol tartrate (LOPRESSOR) tablet 50 mg  50 mg Oral Q12H Taj Anderson MD       • ondansetron (ZOFRAN) tablet 4 mg  4 mg Oral Q6H PRN Taj Anderson MD        Or   • ondansetron ODT (ZOFRAN-ODT) disintegrating tablet 4 mg  4 mg Oral Q6H PRN Taj Anderson MD        Or   • ondansetron (ZOFRAN) injection 4 mg  4 mg Intravenous Q6H PRN Taj Anderson MD       • potassium chloride (MICRO-K) CR capsule 10 mEq  10 mEq Oral Daily With Lunch Taj Anderson MD       • sennosides-docusate sodium (SENOKOT-S) 8.6-50 MG tablet 2 tablet  2 tablet Oral BID Taj Anderson MD       • sodium chloride 0.9 % flush 1-10 mL  1-10 mL Intravenous PRN Taj Anderson MD       • sodium chloride 0.9 % with KCl 20 mEq/L infusion  100 mL/hr Intravenous Continuous Taj Anderson  mL/hr at 06/08/18 0658 100 mL/hr at 06/08/18  "2321   • valsartan (DIOVAN) tablet 20 mg  20 mg Oral Daily With Lunch Taj Anderson MD           ALLERGIES:    Allergies   Allergen Reactions   • Oxycodone-Acetaminophen Unknown (See Comments)     Reaction: hallucinations   • Codeine Unknown (See Comments) and GI Intolerance   • Percocet [Oxycodone-Acetaminophen]    • Tramadol Other (See Comments)     Increased BP and HR. PATIENT STATES HE HAS BEEN TAKING THIS DRUG THIS MONTH.       Objective      Vitals:    06/09/18 0000 06/09/18 0437 06/09/18 0753 06/09/18 0918   BP: 101/45 111/40 99/42    BP Location: Right arm Right arm Right arm    Patient Position: Lying Lying Lying    Pulse: 103 113 109 104   Resp: 18 16 10    Temp: 97.6 °F (36.4 °C) 97 °F (36.1 °C) 98.9 °F (37.2 °C)    TempSrc: Oral Axillary Oral    SpO2: 97% 93% 92% 92%   Weight:       Height:         BP 99/42 (BP Location: Right arm, Patient Position: Lying)   Pulse 104   Temp 98.9 °F (37.2 °C) (Oral)   Resp 10   Ht 155.4 cm (61.2\")   Wt 61.6 kg (135 lb 14.4 oz)   SpO2 92%   BMI 25.51 kg/m²     Current Status 5/31/2018   ECOG score 0         General Appearance:    Pale.Alert, cooperative, no distress   Head:    Normocephalic, without obvious abnormality, atraumatic   Eyes:    PERRL, conjunctiva/corneas clear, EOM's intact, fundi     benign, both eyes   Ears:    Normal TM's and external ear canals, both ears   Nose:   Nares normal, septum midline, mucosa normal, no drainage     or sinus tenderness   Throat:   Lips, mucosa, and tongue normal; teeth and gums normal   Neck:   Supple, symmetrical, trachea midline, no adenopathy;     thyroid:  no enlargement/tenderness/nodules; no carotid    bruit or JVD   Back:     Symmetric, no curvature, ROM normal, no CVA tenderness   Lungs:     Clear to auscultation bilaterally, respirations unlabored   Chest Wall:    No tenderness or deformity    Heart:    Regular rate and rhythm, S1 and S2 normal, no murmur, rub    or gallop   Abdomen:     Soft, " non-tender, bowel sounds active all four quadrants,     no masses, no organomegaly   Extremities:   Extremities normal, atraumatic, no cyanosis. Mild edema   Pulses:   2+ and symmetric all extremities   Skin:   no rashes or lesions   Lymph nodes:   Cervical, supraclavicular, and axillary nodes normal   Neurologic:   CNII-XII intact, normal strength, sensation and reflexes     throughout       RECENT LABS:  Lab Results (last 72 hours)     Procedure Component Value Units Date/Time    Anaerobic Culture - Pleural Fluid, Pleural Cavity [[id number]]  (Normal) Collected:  05/21/18 1333    Specimen:  Pleural Fluid from Pleural Cavity Updated:  05/22/18 1437     Culture No anaerobes isolated at 24 hours    CBC & Differential [[id number]] Collected:  05/22/18 1327    Specimen:  Blood Updated:  05/22/18 1427    Narrative:       The following orders were created for panel order CBC & Differential.  Procedure                               Abnormality         Status                     ---------                               -----------         ------                     Manual Differential[[id number]]                                                         CBC Auto Differential[[id number]]        Abnormal            Final result                 Please view results for these tests on the individual orders.    CBC Auto Differential [[id number]]  (Abnormal) Collected:  05/22/18 1327    Specimen:  Blood Updated:  05/22/18 1427     WBC 10.90 (H) 10*3/mm3      RBC 3.12 (L) 10*6/mm3      Hemoglobin 9.3 (L) g/dL      Hematocrit 29.6 (L) %      MCV 94.9 fL      MCH 29.8 pg      MCHC 31.4 (L) g/dL      RDW 21.8 (H) %      RDW-SD 72.8 (H) fl      MPV 8.9 fL      Platelets 117 (L) 10*3/mm3     Manual Differential [[id number]]  (Abnormal) Collected:  05/22/18 1327    Specimen:  Blood Updated:  05/22/18 1427     Neutrophil % 84.0 (H) %      Lymphocyte % 6.0 (L) %      Monocyte % 1.0 (L) %      Bands %  3.0 %      Metamyelocyte % 6.0 (H) %       Neutrophils Absolute 9.48 (H) 10*3/mm3      Lymphocytes Absolute 0.65 (L) 10*3/mm3      Monocytes Absolute 0.11 (L) 10*3/mm3      nRBC 13.0 (H) /100 WBC      Anisocytosis Slight/1+     Dacrocytes Slight/1+     Hypochromia Slight/1+     Microcytes Slight/1+     Polychromasia Slight/1+     WBC Morphology Normal     Platelet Estimate Decreased    Magnesium [[id number]]  (Normal) Collected:  18 1327    Specimen:  Blood Updated:  18 1408     Magnesium 2.1 mg/dL     Phosphorus [[id number]]  (Normal) Collected:  18 1327    Specimen:  Blood Updated:  18 1408     Phosphorus 2.9 mg/dL     Non-gynecologic Cytology [[id number]] Collected:  18 1334    Specimen:  Body Fluid from Pleural Cavity Updated:  18 1234     Case Report --     Non-gynecologic Cytology                          Case: [id number]                                  Authorizing Provider:  Mitchell Hensley MD  Collected:           2018 01:34 PM          Ordering Location:     [address]  Received:            2018 02:39 PM          Pathologist:           Mayank Sepulveda MD                                                         Specimen:    Pleural Cavity                                                                              Final Diagnosis --     Pleural fluid, thoracentesis:  Negative for malignant cells.  Reactive mesothelial cells and extensive acute inflammation.       Gross Description --     Received fresh in the laboratory in a container labeled with patient name and  designated as pleural fluid.  1300 mls of yellow fluid.  1 thin prep slide and 1 cell block made.         Microscopic Description --     Cytologic evaluation shows a cellular pleural fluid with numerous reactive mesothelial cells and a background of extensive acute inflammation.  No features of malignancy are seen.       Embedded Images --    AFB Culture - Body Fluid, Pleural Cavity [[id number]] Collected:  18  5836    Specimen:  Body Fluid from Pleural Cavity Updated:  05/22/18 1115     AFB Stain No acid fast bacilli seen on direct smear      No acid fast bacilli seen on concentrated smear    Albumin, Fluid - Pleural Fluid, Pleural Cavity [[id number]] Collected:  05/21/18 1333    Specimen:  Pleural Fluid from Pleural Cavity Updated:  05/22/18 1017     Albumin, Fluid 1.2 g/dL      Comment:  ________________________________________________________  :  Peritoneal  :       Pleural          :   Synovial     :  :______________:________________________:________________:  :              : Transudate :  Exudate  :                :  :______________:____________:___________:________________:  :  Not Estab.  : Not Estab. : Not Estab.:   Not Estab.   :  :______________:____________:___________:________________:   The method performance specifications have not been   established for this test in body fluid. The test result   should be integrated into the clinical context for   interpretation.  The reference intervals and other method performance specifications  have not been established for this test. The test result should be  integrated into the clinical context for interpretation.       Narrative:       Performed at:  [address]  [id number]  : Danny Lino PhD, Phone:  [phone]    Protein, Body Fluid - Pleural Fluid, Pleural Cavity [[id number]] Collected:  05/21/18 1333    Specimen:  Pleural Fluid from Pleural Cavity Updated:  05/22/18 1017     Protein, Fluid 2.2 g/dL      Comment:  ________________________________________________________  :  Peritoneal  :       Pleural          :   Synovial     :  :______________:________________________:________________:  :              : Transudate :  Exudate  :                :  :______________:____________:___________:________________:  :  Not Estab.  :   <3 g/dL  :  >3 g/dL  :    <2.5 g/dL    :  :______________:____________:___________:________________:   The method performance specifications have not been   established for this test in body fluid. The test result   should be integrated into the clinical context for   interpretation.  The method performance specifications have not been established for  this test in body fluid.  The test result should be integrated into  the clinical context for interpretation.       Narrative:       Performed at:   - [address]  [id number]  : Danny Lino PhD, Phone:  [phone]    Lactate Dehydrogenase, Body Fluid - Body Fluid, Pleural Cavity [[id number]] Collected:  05/21/18 1334    Specimen:  Body Fluid from Pleural Cavity Updated:  05/22/18 1017     LD, Fluid 253 IU/L      Comment:  __________________________________________________________  : Peritoneal :       Pleural        : Synovial :    CSF    :  :____________:______________________:__________:___________:  :            : Transudate: Exudate  :          :           :  :____________:___________:__________:__________:___________:  : Not Estab. : <200 U/L  : >200 U/L : <240 U/L : Not Estab.:  :____________:___________:__________:______________________:   The method performance specifications have not been   established for this test in body fluid. The test result   should be integrated into the clinical context for   interpretation.  The reference intervals and other method performance specifications  have not been established for this test. The test result should be  integrated into the clinical context for interpretation.       Narrative:       Performed at:  [address]  [id number]  : Danny Lino PhD, Phone:  [phone]    Glucose, Body Fluid - Pleural Fluid, Pleural Cavity [[id number]] Collected:  05/21/18 1333    Specimen:  Pleural Fluid from Pleural Cavity Updated:  05/22/18 1017     Glucose, Fluid 123  mg/dL      Comment:  ________________________________________________________  :  Peritoneal  :       Pleural          :   Synovial     :  :______________:________________________:________________:  :              : Transudate :  Exudate  :                :  :______________:____________:___________:________________:  :  Not Estab.  :  Equal to simultaneously drawn plasma   :  :______________:_________________________________________:   The method performance specifications have not been   established for this test in body fluid. The test result   should be integrated into clinical context for   interpretation.  The reference intervals and other method performance specifications  have not been established for this test. The test result should be  integrated into the clinical context for interpretation.       Narrative:       Performed at:  [address] [place] [address]  [id number]  : Danny Lino PhD, Phone:  [phone]    Triglycerides, Body Fluid - Pleural Fluid, Pleural Cavity [[id number]] Collected:  05/21/18 1333    Specimen:  Pleural Fluid from Pleural Cavity Updated:  05/22/18 1017     Triglycerides, Fluid 17 mg/dL      Comment:  ________________________________________________________  :  Peritoneal  :       Pleural          :   Synovial     :  :______________:________________________:________________:  :              : Transudate :  Exudate  :                :  :______________:____________:___________:________________:  :  Not Estab.  : Not Estab. : Not Estab.:   Not Estab.   :  :______________:____________:___________:________________:   The method performance specifications have not been   established for this test in body fluid. The test result   should be integrated into the clinical context for   interpretation.  The reference intervals and other method performance specifications  have not been established for this test. The test result should be  integrated into the  clinical context for interpretation.       Narrative:       Performed at:  [address] - [facility]  [id number]  : Danny Lino PhD, Phone:  [phone]    Amylase, Body Fluid - Body Fluid, Pleural Cavity [[id number]] Collected:  05/21/18 1334    Specimen:  Body Fluid from Pleural Cavity Updated:  05/22/18 1017     Amylase, Fluid 23 U/L      Comment:  ________________________________________________________  :  Peritoneal  :       Pleural          :   Synovial     :  :______________:________________________:________________:  :              : Transudate :  Exudate  :                :  :______________:____________:___________:________________:  :   U/L  : Not Estab. : Not Estab.:   Not Estab.   :  :______________:____________:___________:________________:   The method performance specifications have not been   established for this test in body fluid. The test result   should be integrated into the clinical context for   interpretation.  The method performance specifications have not been established for  this test in body fluid.  The test result should be integrated into  the clinical context for interpretation.       Narrative:       Performed at:  01 - [facility] [address]  [id number]  : Danny Lino PhD, Phone:  [phone]    Body Fluid Culture - Body Fluid, Pleural Cavity [[id number]]  (Normal) Collected:  05/21/18 1334    Specimen:  Body Fluid from Pleural Cavity Updated:  05/22/18 1002     BF Culture No growth at 24 hours     Gram Stain Result Many (4+) WBCs seen      No organisms seen    Body Fluid Cell Count With Differential - Body Fluid, Pleural Cavity [[id number]] Collected:  05/21/18 1334    Specimen:  Body Fluid from Pleural Cavity Updated:  05/21/18 1523    Narrative:       The following orders were created for panel order Body Fluid Cell Count With Differential - Body Fluid, Pleural Cavity.  Procedure                                Abnormality         Status                     ---------                               -----------         ------                     Body fluid cell count - ...[[id number]]                      Final result               Body fluid differential ...[[id number]]                      Final result                 Please view results for these tests on the individual orders.    Body fluid differential - Body Fluid, [[id number]] Collected:  05/21/18 1334    Specimen:  Body Fluid from Pleural Cavity Updated:  05/21/18 1523     Neutrophils, Fluid 40 %      Lymphocytes, Fluid 25 %      Monocytes, Fluid 4 %      Unclassified Cells, Fluid 31 %     Body fluid cell count - Body Fluid, [[id number]] Collected:  05/21/18 1334    Specimen:  Body Fluid from Pleural Cavity Updated:  05/21/18 1450     Color, Fluid Yellow     Appearance, Fluid Clear     WBC, Fluid 105 /mm3      RBC, Fluid 1,000 /mm3     pH, Body Fluid - Body Fluid, Pleural Cavity [[id number]] Collected:  05/21/18 1334    Specimen:  Body Fluid from Pleural Cavity Updated:  05/21/18 1408    Fungus Culture - Body Fluid, Pleural Cavity [[id number]] Collected:  05/21/18 1334    Specimen:  Body Fluid from Pleural Cavity Updated:  05/21/18 1400    Cholesterol, Body Fluid - Pleural Fluid, Pleural Cavity [[id number]] Collected:  05/21/18 1333    Specimen:  Pleural Fluid from Pleural Cavity Updated:  05/21/18 1400    Manual Differential [[id number]]  (Abnormal) Collected:  05/21/18 0354    Specimen:  Blood Updated:  05/21/18 0622     Neutrophil % 75.0 %      Lymphocyte % 17.0 %      Eosinophil % 1.0 %      Bands %  5.0 %      Metamyelocyte % 1.0 (H) %      Atypical Lymphocyte % 1.0 %      Neutrophils Absolute 5.90 10*3/mm3      Lymphocytes Absolute 1.25 10*3/mm3      Eosinophils Absolute 0.07 10*3/mm3      nRBC 2.0 (H) /100 WBC      Dacrocytes Slight/1+     Poikilocytes Slight/1+     WBC Morphology Normal     Platelet Estimate Decreased    CBC & Differential [[id number]]  Collected:  05/21/18 0354    Specimen:  Blood Updated:  05/21/18 0622    Narrative:       The following orders were created for panel order CBC & Differential.  Procedure                               Abnormality         Status                     ---------                               -----------         ------                     Manual Differential[[id number]]                                                         CBC Auto Differential[[id number]]        Abnormal            Final result                 Please view results for these tests on the individual orders.    CBC Auto Differential [[id number]]  (Abnormal) Collected:  05/21/18 0354    Specimen:  Blood Updated:  05/21/18 0622     WBC 7.38 10*3/mm3      RBC 2.66 (L) 10*6/mm3      Hemoglobin 7.9 (L) g/dL      Hematocrit 25.3 (L) %      MCV 95.1 (H) fL      MCH 29.7 pg      MCHC 31.2 (L) g/dL      RDW 21.3 (H) %      RDW-SD 71.3 (H) fl      MPV 9.8 fL      Platelets 103 (L) 10*3/mm3      nRBC 3.8 (H) /100 WBC      Comment: Appended report. These results have been appended to a previously preliminary verified report.       Basic Metabolic Panel [[id number]]  (Abnormal) Collected:  05/21/18 0354    Specimen:  Blood Updated:  05/21/18 0427     Glucose 105 (H) mg/dL      BUN 18 mg/dL      Creatinine 0.45 (L) mg/dL      Sodium 134 (L) mmol/L      Potassium 3.3 (L) mmol/L      Chloride 98 mmol/L      CO2 31.0 mmol/L      Calcium 6.7 (L) mg/dL      eGFR Non African Amer >150 mL/min/1.73      BUN/Creatinine Ratio 40.0 (H)     Anion Gap 5.0 mmol/L     Narrative:       The MDRD GFR formula is only valid for adults with stable renal function between ages 18 and 70.    Protime-INR [[id number]]  (Abnormal) Collected:  05/21/18 0354    Specimen:  Blood Updated:  05/21/18 0421     Protime 15.3 (H) Seconds      INR 1.17 (H)    Protime-INR [[id number]]  (Abnormal) Collected:  05/20/18 1155    Specimen:  Blood Updated:  05/20/18 1212     Protime 14.8 (H) Seconds      INR 1.12 (H)     Hemoglobin A1c [[id number]] Collected:  05/20/18 0432    Specimen:  Blood Updated:  05/20/18 0641     Hemoglobin A1C 5.3 %     Narrative:       Less than 6.0           Non-Diabetic Range  6.0-7.0                 ADA Therapeutic Target  Greater than 7.0        Action Suggested    TSH [[id number]]  (Normal) Collected:  05/20/18 0432    Specimen:  Blood Updated:  05/20/18 0530     TSH 0.919 mIU/mL     Lipid Panel [[id number]]  (Abnormal) Collected:  05/20/18 0432    Specimen:  Blood Updated:  05/20/18 0510     Total Cholesterol 159 mg/dL      Triglycerides 136 mg/dL      HDL Cholesterol 36 (L) mg/dL      LDL Cholesterol  96 mg/dL      LDL/HDL Ratio 2.66    Basic Metabolic Panel [[id number]]  (Abnormal) Collected:  05/20/18 0432    Specimen:  Blood Updated:  05/20/18 0500     Glucose 111 (H) mg/dL      BUN 25 (H) mg/dL      Creatinine 0.46 (L) mg/dL      Sodium 136 mmol/L      Potassium 3.5 mmol/L      Chloride 99 mmol/L      CO2 31.0 mmol/L      Calcium 7.1 (L) mg/dL      eGFR Non African Amer >150 mL/min/1.73      BUN/Creatinine Ratio 54.3 (H)     Anion Gap 6.0 mmol/L     Narrative:       The MDRD GFR formula is only valid for adults with stable renal function between ages 18 and 70.    Magnesium [[id number]]  (Normal) Collected:  05/20/18 0432    Specimen:  Blood Updated:  05/20/18 0500     Magnesium 2.1 mg/dL     CBC (No Diff) [[id number]]  (Abnormal) Collected:  05/20/18 0432    Specimen:  Blood Updated:  05/20/18 0449     WBC 8.34 10*3/mm3      RBC 2.78 (L) 10*6/mm3      Hemoglobin 8.2 (L) g/dL      Hematocrit 26.6 (L) %      MCV 95.7 (H) fL      MCH 29.5 pg      MCHC 30.8 (L) g/dL      RDW 21.5 (H) %      RDW-SD 72.4 (H) fl      MPV 9.7 fL      Platelets 112 (L) 10*3/mm3           None     RADIOLOGY:  Us Chest    Result Date: 5/21/2018  Narrative: EXAMINATION: Ultrasound-guided thoracentesis  FINDINGS: The patient's chest radiograph is reviewed which demonstrates a large left-sided pleural effusion. Sonography is  performed over the left hemithorax which demonstrates a a large left-sided pleural effusion which appears to be a simple effusion without evidence of internal septation or complexity.  There is some benefits of the procedure were discussed with the patient. A signed consent form was on the chart at the time of the procedure. Under aseptic conditions and using 1% Xylocaine for anesthesia I advanced a 6 Nigerian Yueh catheter into the left pleural space. I manually removed approximately 2 L of straw-colored fluid from the left pleural space without immediate complications. There was effusion remaining but I terminated the procedure at 2 L. The patient tolerated procedure well. Post thoracentesis chest radiograph demonstrates minimal residual effusion. There is a pneumonia within the lingular segment of the left upper lobe.      Impression: . 1. Left-sided thoracentesis performed with removal of 2 L of straw-colored fluid from the left pleural space. There was residual effusion present but I did not remove any subsequent fluid secondary to the high volume. Post thoracentesis chest radiograph demonstrates minimal residual effusion. There is a infiltrate involving the lingular segment of the left upper lobe. This report was finalized on 05/21/2018 14:32 by Dr. Steven Nails MD.    Xr Chest 1 View    Result Date: 5/23/2018  Narrative: Exam:   XR CHEST 1 VW-   Date:  5/23/2018  History:  Male, age  77 years;s/p thoracentesis  COMPARISON:  Chest x-ray dated 5/22/2018  Findings :  The heart and mediastinum are similar in size. Small-to-moderate left pleural effusion, decreased from 5/22/2018. There is no measurable pneumothorax. Right pleural effusion and associated opacity, also decreased.  The bones show no acute pathology.       Impression: Impression:  1.  Decreased left pleural effusion and opacity. 2.  Trace right pleural effusion, also decreased.  This report was finalized on 05/23/2018 08:30 by Dr. Amparo Pearce  MD.    Xr Chest 1 View    Result Date: 5/22/2018  Narrative: XR CHEST 1 VW- 5/22/2018 1:22 PM CDT  HISTORY: s/p thoracentesis   COMPARISON: 5/21/2018.  FINDINGS: There is no pneumothorax. Airspace opacities in the LEFT lung have increased since the previous study and likely represent worsening pneumonia. The cardiomediastinal silhouette and pulmonary vascularity are unchanged.  The osseous structures and surrounding soft tissues demonstrate no acute abnormality.      Impression: 1. Worsening LEFT lung pneumonia. 2. No pneumothorax.   This report was finalized on 05/22/2018 14:00 by Dr. Nura Allen MD.    Xr Chest 1 View    Result Date: 5/21/2018  Narrative: EXAMINATION: Chest 1 view 5/21/2018  HISTORY: Pleural effusion postthoracentesis  FINDINGS: Today's exam is compared to previous study of one day earlier. The patient's undergone left-sided thoracentesis with no evidence of complication. There is a left basilar pneumonia suspected to be in the lingular segment of the left upper lobe. There is no pneumothorax. The right lung is fully expanded and clear.      Impression: . Minimal residual sided effusion postthoracentesis. There is a a pneumonia within the left base suspected to involve the lingular segment of the left upper lobe. This report was finalized on 05/21/2018 14:34 by Dr. Steven Nails MD.    Us Thoracentesis    Result Date: 5/21/2018  Narrative: EXAMINATION: Ultrasound-guided thoracentesis  FINDINGS: The patient's chest radiograph is reviewed which demonstrates a large left-sided pleural effusion. Sonography is performed over the left hemithorax which demonstrates a a large left-sided pleural effusion which appears to be a simple effusion without evidence of internal septation or complexity.  There is some benefits of the procedure were discussed with the patient. A signed consent form was on the chart at the time of the procedure. Under aseptic conditions and using 1% Xylocaine for anesthesia I  advanced a 6 Yoruba Yueh catheter into the left pleural space. I manually removed approximately 2 L of straw-colored fluid from the left pleural space without immediate complications. There was effusion remaining but I terminated the procedure at 2 L. The patient tolerated procedure well. Post thoracentesis chest radiograph demonstrates minimal residual effusion. There is a pneumonia within the lingular segment of the left upper lobe.      Impression: . 1. Left-sided thoracentesis performed with removal of 2 L of straw-colored fluid from the left pleural space. There was residual effusion present but I did not remove any subsequent fluid secondary to the high volume. Post thoracentesis chest radiograph demonstrates minimal residual effusion. There is a infiltrate involving the lingular segment of the left upper lobe. This report was finalized on 05/21/2018 14:32 by Dr. Steven Nails MD.    Xr Chest Pa & Lateral    Result Date: 5/20/2018  Narrative: Exam:   XR CHEST PA AND LATERAL-   Date:  5/20/2018  History:  Male, age  77 years;pleural effusion  COMPARISON:  The chest dated 5/1/2018  Findings :  The left heart border is indistinct. There is a moderate left pleural effusion. Median sternotomy wires appear similar. Trace right pleural effusion. No acute osseous pathology. No measurable pneumothorax.    Impression: Impression:  Bilateral pleural effusions, moderate on the left and trace on the right.  This report was finalized on 05/20/2018 10:29 by Dr. Amparo Pearce MD.            Assessment/Plan 77 year old male with metastatic prostate ca to the bone. Pt has failed multiple lines of standard  therapy, most recent was docetaxel, and ZEE presenting with a cranial mass concerning for meningioma though prostate ca mets is likely    Active Problems:    Prostate cancer metastatic to bone    Brain mass    Aphasia    Dysphagia    Dehydration    Thrombocytopenia    Prostate cancer            1.Met Prostate ca: met  prostate cancer pt progressed on multiple lines of standard therapy now with meningioma. Given his PS of 3 in the past we had stopped chemo on progression and started premarin. With new mass concerning for meningioma  Vs metastasis, he is not a candidate for aggressive therapy. Discussed with both neurosx Dr Anderson and Hospitalist Dr Perez. Hospice is a good option and family is in aggrement        Time Spent: 60 minutes; greater than  50% of time was spent in patient counseling and care coordination.     Ash Ojeda MD    6/9/2018    10:29 AM      Thank you for this consultation and opportunity to participate in this patient's care.

## 2018-06-09 NOTE — H&P
NEUROSURGERY INITIAL HOSPITAL ENCOUNTER    Assessment/Plan:   Jay Jones is a 77 y.o. male with a significant comorbidity ablation on Eliquis and widely metastatic prostate cancer status post prostatectomy.  He presents with a new problem of confusion, aphasia. Physical exam findings of word salad, shortness of breath and cachexia.  Their imaging shows nearly discovered left sphenoid wing mass with surrounding edema.    Differential Diagnosis:   Brain mass: Meningioma versus metastatic prostate disease  Shortness of air: Pneumonia versus pleural effusion versus much less likely pulmonary embolism  Cachexia  Severe dehydration  Thrombocytopenia  Widely metastatic prostate cancer  Bipedal edema  Dysphagia    Recommendations:  Most likely differential diagnosis and Jay's case would be sphenoid wing meningioma.  Meningiomas are known to show progression in patient's who are pregnant or on estrogen or progesterone.  Alternatively this could be metastatic prostate cancer to the brain, however this is extremely rare.  Regardless of the diagnosis of metastatic prostate cancer or meningioma, I would only offer high-dose steroids and Keppra.  Given his low KPS.  At this time the patient's family would not even desire an MRI as it would not change treatment strategy.    After discussion with Jay and his wife there decided not to proceed further treatment and would like to pursue hospice care.    I will contact the hospitalist service and Dr. BELTRÁN to assume his care.    Chest x-ray, CMP, CBC, midline catheter given difficult access, speech evaluation, SSI, Harvey for possible urinary retention, UA    I discussed the patients findings and my recommendations with patient and family    Level of Risk: High due to:  abrupt change in neurological status  MDM: High Complexity  Mod = 66674, High=99223  ___________________________________________________________________    Reason for consult  no onset aphasia      HPI: Patient is a 77 y.o.  male with history of coronary artery disease s/p CABG, atrial flutter, metastatic prostate cancer with widely metastic bone metastasis.  He was recently admitted to [facility] on 5/20/18 with tachycardia, lower extremity swelling and chronic exertional dyspnea.  He subsequently underwent thoracentesis. He was placed on Eliquis for stroke prophylaxis. He was loaded with digoxin and transitioned to oral therapy and converted to normal sinus rhythm.    He has no prior cranial imaging and no known metastasis to the brain.  He was in his normal state of health until approximately 2 days ago when he had a sudden decline in mobility.  The patient's wife states that he began coughing and had difficulty breathing.  Since this time he is merely transitioned from bed to his seated walker he would roll to his chair and transition from a walker to the chair.  His only other debility was to make to the bathroom.  He was not able to perform any activities of self-care.  Beginning this morning the patient had word salad.  He showed extreme confusion.  There is no evidence of hemiparesis.  This was not intermittent in nature but acute onset.  The patient denies, or does not show the evidence of headache.  He does continue to show signs and symptoms of hole IV pain including back pain which I attribute to his spinal metastasis.  He has not had a seizure like episodes including shaking, tremulousness, tongue biting, or urination.    ECOG  (4) Completely disabled, unable to carry out self-care and confined to bed or chair    KPS  30:  Severely disabled; hospitalization indication      Review of Systems   Constitutional: Positive for activity change, appetite change and unexpected weight change.   HENT: Negative.    Eyes: Negative.    Respiratory: Positive for cough, shortness of breath and wheezing.    Cardiovascular: Negative.    Gastrointestinal: Negative.    Endocrine: Positive for cold intolerance.   Genitourinary: Negative.     Musculoskeletal: Negative.    Skin: Positive for pallor.   Allergic/Immunologic: Negative.    Neurological: Positive for speech difficulty.   Hematological: Negative.    Psychiatric/Behavioral: Positive for confusion.        Past Medical History:  has a past medical history of Asthma; CAD in native artery; Cancer; Coronary artery disease; HLD (hyperlipidemia); Infectious viral hepatitis; Prostate CA (2/6/2018); Prostate cancer metastatic to bone (2/6/2018); and Unspecified atrial flutter.    Past Surgical History:  has a past surgical history that includes Cardiac catheterization (Left, 11/01/2006); Appendectomy; Prostatectomy; Cholecystectomy; Coronary artery bypass graft (11/03/2006); and Other surgical history (02/28/2007).    Family History: family history includes Alzheimer's disease in his mother; Heart attack in his father; Heart disease in his father, maternal grandfather, and other; Heart failure in his father.    Social History:  reports that he quit smoking about 13 months ago. His smoking use included Cigars. He has never used smokeless tobacco. He reports that he does not drink alcohol or use drugs.    Allergies: Oxycodone-acetaminophen; Codeine; Percocet [oxycodone-acetaminophen]; and Tramadol    Home Medications: No current facility-administered medications for this encounter.     Medications: Scheduled Meds:  Continuous Infusions:  No current facility-administered medications for this encounter.   PRN Meds:.    Vital Signs       Physical Exam  Physical Exam   Constitutional: He appears well-developed and well-nourished.   HENT:   Head: Normocephalic and atraumatic.   Eyes: Conjunctivae and EOM are normal. Pupils are equal, round, and reactive to light.   Fundoscopic exam:       The right eye shows no exudate, no hemorrhage and no papilledema.        The left eye shows no exudate, no hemorrhage and no papilledema.   Neck: Normal range of motion. Neck supple.   Cardiovascular:   Pulses:        Dorsalis pedis pulses are 2+ on the right side, and 2+ on the left side.        Posterior tibial pulses are 2+ on the right side, and 2+ on the left side.   Pulmonary/Chest: He is in respiratory distress. He has wheezes.     Vascular Status -  His right foot exhibits abnormal foot edema. His left foot exhibits abnormal foot edema.  Neurological: He has a normal Finger-Nose-Finger Test, a normal Heel to Rodas Test and a normal Tandem Gait Test.   Skin: Skin is warm. No rash noted. No erythema.       Neurologic Exam     Mental Status   Disoriented to person.   Disoriented to place.   Disoriented to year.   Attention: decreased. Concentration: decreased.   Speech: (Word salad.  )  Level of consciousness: drowsy    Cranial Nerves     CN II   Visual acuity: normal    CN III, IV, VI   Pupils are equal, round, and reactive to light.  Extraocular motions are normal.   Diplopia: none    CN V   Facial sensation intact.   Right corneal reflex: normal  Left corneal reflex: normal    CN VII   Right facial weakness: none  Left facial weakness: none    CN VIII   Hearing: intact    CN IX, X   Palate: symmetric  Right gag reflex: normal  Left gag reflex: normal    CN XI   Right trapezius strength: normal  Left trapezius strength: normal    CN XII   Tongue deviation: none    Motor Exam   Right arm tone: normal  Left arm tone: normal  Right arm pronator drift: absent  Left arm pronator drift: absent  Right leg tone: normal  Left leg tone: normal    Strength   Strength 5/5 except as noted.   Right deltoid: 4/5  Left deltoid: 4/5  Right biceps: 4/5  Left biceps: 4/5  Right triceps: 4/5  Left triceps: 4/5  Right interossei: 4/5  Left interossei: 4/5  Right iliopsoas: 4/5  Left iliopsoas: 4/5  Right quadriceps: 4/5  Left quadriceps: 4/5  Right anterior tibial: 4/5  Left anterior tibial: 4/5  Right posterior tibial: 4/5  Left posterior tibial: 4/5  Right gastroc: 4/5  Left gastroc: 4/5    Sensory Exam   Light touch normal.   Proprioception  normal.     Gait, Coordination, and Reflexes     Gait  Gait: (nonambulatory)    Coordination   Finger to nose coordination: normal  Heel to shin coordination: normal  Tandem walking coordination: normal    Reflexes   Reflexes 2+ except as noted.   Right plantar: normal  Left plantar: normal  Right Kapoor: absent  Left Kapoor: absent      Results Review:   Independent review and interpretation of imaging  Imaging Results (last 24 hours)     ** No results found for the last 24 hours. **          CT Head:  Noncontrast CT of the brain performed at outside hospital on 6/8/2018.  There is evidence of a left frontal and temporal mass associated with the sphenoid wing.  There is surrounding edema in both the left temporal horn and left frontal lobe.  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  The evidence of surrounding cerebral edema suggest either metastasis or high-grade conversion of meningioma.                I reviewed the patient's new clinical results.  Lab Results (last 24 hours)     ** No results found for the last 24 hours. **        Outside hospital EKG normal sinus rhythm.    Review of outside hospital labs.    INR 1.2, CK-MB 0.8, CPK 74, LDH 2100, troponin less than 0.02, digoxin level 0.57, glucose 147, B1 26, creatinine 0.5, BUN/creatinine ratio 52, sodium 138, potassium 4.5, alkaline phosphatase 744    WBC 9.2, hemoglobin 8.3, hematocrit 27, platelets 80.    Taj Anderson MD

## 2018-06-09 NOTE — PROGRESS NOTES
Discharge Planning Assessment  [facility]     Patient Name: Jay Jones  MRN: [id number]  Today's Date: 6/9/2018    Admit Date: 6/8/2018          Discharge Needs Assessment     Row Name 06/09/18 0840       Living Environment    Lives With spouse    Name(s) of Who Lives With Patient Audrey    Current Living Arrangements home/apartment/condo    Primary Care Provided by spouse/significant other    Provides Primary Care For no one, unable/limited ability to care for self    Family Caregiver if Needed spouse    Quality of Family Relationships helpful;involved;supportive       Resource/Environmental Concerns    Resource/Environmental Concerns none       Transition Planning    Patient/Family Anticipates Transition to home with help/services    Patient/Family Anticipated Services at Transition hospice care    Transportation Anticipated family or friend will provide;health plan transportation       Discharge Needs Assessment    Readmission Within the Last 30 Days current reason for admission unrelated to previous admission;previous discharge plan unsuccessful    Concerns to be Addressed no discharge needs identified    Equipment Currently Used at Home rollator;shower chair    Anticipated Changes Related to Illness inability to care for self    Discharge Coordination/Progress Referral for hospice.  SW has faxed referral to [facility] and spoke to Irene on call.  They will call pt's spouse to setup a meeting time.            Discharge Plan    No documentation.       Destination     No service coordination in this encounter.      Durable Medical Equipment     No service coordination in this encounter.      Dialysis/Infusion     No service coordination in this encounter.      Home Medical Care     No service coordination in this encounter.      Social Care     No service coordination in this encounter.                Demographic Summary    No documentation.           Functional Status    No documentation.            Psychosocial    No documentation.           Abuse/Neglect    No documentation.           Legal    No documentation.           Substance Abuse    No documentation.           Patient Forms    No documentation.         JAMAL CastilloW

## 2018-06-09 NOTE — DISCHARGE PLACEMENT REQUEST
"To:  [facility]   From:  Lauren Chavira, ROLF  [phone]    Jay Lobato  (77 y.o. Male)     Date of Birth Social Security Number Address Home Phone MRN    1940  [address] [facility]  [place] [address] [phone] [id number]    Gnosticism Marital Status          Confucianism        Admission Date Admission Type Admitting Provider Attending Provider Department, Room/Bed    6/8/18 Urgent Taj Anderson MD [place], William Lee, MD [facility] 3A, 325/1    Discharge Date Discharge Disposition Discharge Destination                       Attending Provider:  Taj Anderson MD    Allergies:  Oxycodone-acetaminophen, Codeine, Percocet [Oxycodone-acetaminophen], Tramadol    Isolation:  None   Infection:  None   Code Status:  FULL    Ht:  155.4 cm (61.2\")   Wt:  61.6 kg (135 lb 14.4 oz)    Admission Cmt:  None   Principal Problem:  None                Active Insurance as of 6/8/2018     Primary Coverage     Payor Plan Insurance Group Employer/Plan Group    MISC COMMERCIAL MISC COMMERCIAL INS [id number]     Coverage Address Coverage Phone Number Effective From Effective To    [address] [phone] 1/1/2006     [employer] [id number]       Subscriber Name Subscriber Birth Date Member ID       JAY LOBATO 1940 [id number]                 Emergency Contacts      (Rel.) Home Phone Work Phone Mobile Phone    Audrey Lobato (Spouse) [phone] -- [phone]    VenusMuriel (Daughter) [phone] -- [phone]    HearnJarrett (Friend) [phone] -- --        Inpatient Hospice / [facility] Consult [[id number]] (Order [id number])   Order   Date: 6/9/2018 Department: [address] Released By: Zoie Del Castillo RN (auto-released) Authorizing: DONALD Saldaña   Order History   Inpatient   Date/Time Action Taken User Additional Information   06/09/18 0832 Release Zoie Del Castillo RN (auto-released) From Order: [id number]   Order Details     Frequency " Duration Priority Order Class   Once 1  occurrence Routine Hospital Performed   Order Questions     Question Answer Comment   Reason for Consult? hospice, prostate cancer with bone mets aphagia new dx of brain tumor           Process Instructions     [facility]  Please call Hospice consults/concerns to [facility] site to ext [phone] (office) [phone] (pager).    On weekends/holidays contact the --pager [phone].     [facility]:  Please call [facility] at [phone]     [facility]:  Please call the  for your unit. For after hours, contact the hospital .          Verbal Order Info     Action Created on Order Mode Entered by Responsible Provider Signed by Signed on   Ordering 06/09/18 0832 Telephone with readback RAN Sheth APRN     Consult Order Info     ID Description Priority Start Date Start Time   [id number] Inpatient Hospice / [facility] Consult Routine 06/09/2018  8:33 AM   Provider Specialty Referred to   Hospice and Palliative Medicine _____________________________________   Reprint Order Requisition     Inpatient Hospice / [facility] Consult (Order #[id number]) on 6/9/18   Encounter     View Encounter           Order Provider Info         Office phone Pager E-mail   Ordering User Zoie Del Castillo RN -- -- --   Authorizing Provider DONALD Saldaña [phone] -- --   Attending Provider Taj Anderson [phone] -- --   Order Transmittal Tracking     Inpatient Hospice / [facility] Consult (Order #[id number]) on 6/9/18            History & Physical      Taj Anderson MD at 6/8/2018  9:03 PM          NEUROSURGERY INITIAL HOSPITAL ENCOUNTER    Assessment/Plan:   Jay Jones is a 77 y.o. male with a significant comorbidity ablation on Eliquis and widely metastatic prostate cancer status post prostatectomy.  He presents with a new problem of confusion, aphasia. Physical exam findings of word salad, shortness of breath and cachexia.   Their imaging shows nearly discovered left sphenoid wing mass with surrounding edema.    Differential Diagnosis:   Brain mass: Meningioma versus metastatic prostate disease  Shortness of air: Pneumonia versus pleural effusion versus much less likely pulmonary embolism  Cachexia  Severe dehydration  Thrombocytopenia  Widely metastatic prostate cancer  Bipedal edema  Dysphagia    Recommendations:  Most likely differential diagnosis and Jay's case would be sphenoid wing meningioma.  Meningiomas are known to show progression in patient's who are pregnant or on estrogen or progesterone.  Alternatively this could be metastatic prostate cancer to the brain, however this is extremely rare.  Regardless of the diagnosis of metastatic prostate cancer or meningioma, I would only offer high-dose steroids and Keppra.  Given his low KPS.  At this time the patient's family would not even desire an MRI as it would not change treatment strategy.    After discussion with Jay and his wife there decided not to proceed further treatment and would like to pursue hospice care.    I will contact the hospitalist service and Dr. BELTRÁN to assume his care.    Chest x-ray, CMP, CBC, midline catheter given difficult access, speech evaluation, SSI, Harvey for possible urinary retention, UA    I discussed the patients findings and my recommendations with patient and family    Level of Risk: High due to:  abrupt change in neurological status  MDM: High Complexity  Mod = 59642, High=99223  ___________________________________________________________________    Reason for consult  no onset aphasia      HPI: Patient is a 77 y.o. male with history of coronary artery disease s/p CABG, atrial flutter, metastatic prostate cancer with widely metastic bone metastasis.  He was recently admitted to [facility] on 5/20/18 with tachycardia, lower extremity swelling and chronic exertional dyspnea.  He subsequently underwent thoracentesis. He was placed on Eliquis for stroke  prophylaxis. He was loaded with digoxin and transitioned to oral therapy and converted to normal sinus rhythm.    He has no prior cranial imaging and no known metastasis to the brain.  He was in his normal state of health until approximately 2 days ago when he had a sudden decline in mobility.  The patient's wife states that he began coughing and had difficulty breathing.  Since this time he is merely transitioned from bed to his seated walker he would roll to his chair and transition from a walker to the chair.  His only other debility was to make to the bathroom.  He was not able to perform any activities of self-care.  Beginning this morning the patient had word salad.  He showed extreme confusion.  There is no evidence of hemiparesis.  This was not intermittent in nature but acute onset.  The patient denies, or does not show the evidence of headache.  He does continue to show signs and symptoms of hole IV pain including back pain which I attribute to his spinal metastasis.  He has not had a seizure like episodes including shaking, tremulousness, tongue biting, or urination.    ECOG  (4) Completely disabled, unable to carry out self-care and confined to bed or chair    KPS  30:  Severely disabled; hospitalization indication      Review of Systems   Constitutional: Positive for activity change, appetite change and unexpected weight change.   HENT: Negative.    Eyes: Negative.    Respiratory: Positive for cough, shortness of breath and wheezing.    Cardiovascular: Negative.    Gastrointestinal: Negative.    Endocrine: Positive for cold intolerance.   Genitourinary: Negative.    Musculoskeletal: Negative.    Skin: Positive for pallor.   Allergic/Immunologic: Negative.    Neurological: Positive for speech difficulty.   Hematological: Negative.    Psychiatric/Behavioral: Positive for confusion.        Past Medical History:  has a past medical history of Asthma; CAD in native artery; Cancer; Coronary artery disease;  HLD (hyperlipidemia); Infectious viral hepatitis; Prostate CA (2/6/2018); Prostate cancer metastatic to bone (2/6/2018); and Unspecified atrial flutter.    Past Surgical History:  has a past surgical history that includes Cardiac catheterization (Left, 11/01/2006); Appendectomy; Prostatectomy; Cholecystectomy; Coronary artery bypass graft (11/03/2006); and Other surgical history (02/28/2007).    Family History: family history includes Alzheimer's disease in his mother; Heart attack in his father; Heart disease in his father, maternal grandfather, and other; Heart failure in his father.    Social History:  reports that he quit smoking about 13 months ago. His smoking use included Cigars. He has never used smokeless tobacco. He reports that he does not drink alcohol or use drugs.    Allergies: Oxycodone-acetaminophen; Codeine; Percocet [oxycodone-acetaminophen]; and Tramadol    Home Medications: No current facility-administered medications for this encounter.     Medications: Scheduled Meds:  Continuous Infusions:  No current facility-administered medications for this encounter.   PRN Meds:.    Vital Signs       Physical Exam  Physical Exam   Constitutional: He appears well-developed and well-nourished.   HENT:   Head: Normocephalic and atraumatic.   Eyes: Conjunctivae and EOM are normal. Pupils are equal, round, and reactive to light.   Fundoscopic exam:       The right eye shows no exudate, no hemorrhage and no papilledema.        The left eye shows no exudate, no hemorrhage and no papilledema.   Neck: Normal range of motion. Neck supple.   Cardiovascular:   Pulses:       Dorsalis pedis pulses are 2+ on the right side, and 2+ on the left side.        Posterior tibial pulses are 2+ on the right side, and 2+ on the left side.   Pulmonary/Chest: He is in respiratory distress. He has wheezes.     Vascular Status -  His right foot exhibits abnormal foot edema. His left foot exhibits abnormal foot edema.  Neurological: He  has a normal Finger-Nose-Finger Test, a normal Heel to Rodas Test and a normal Tandem Gait Test.   Skin: Skin is warm. No rash noted. No erythema.       Neurologic Exam     Mental Status   Disoriented to person.   Disoriented to place.   Disoriented to year.   Attention: decreased. Concentration: decreased.   Speech: (Word salad.  )  Level of consciousness: drowsy    Cranial Nerves     CN II   Visual acuity: normal    CN III, IV, VI   Pupils are equal, round, and reactive to light.  Extraocular motions are normal.   Diplopia: none    CN V   Facial sensation intact.   Right corneal reflex: normal  Left corneal reflex: normal    CN VII   Right facial weakness: none  Left facial weakness: none    CN VIII   Hearing: intact    CN IX, X   Palate: symmetric  Right gag reflex: normal  Left gag reflex: normal    CN XI   Right trapezius strength: normal  Left trapezius strength: normal    CN XII   Tongue deviation: none    Motor Exam   Right arm tone: normal  Left arm tone: normal  Right arm pronator drift: absent  Left arm pronator drift: absent  Right leg tone: normal  Left leg tone: normal    Strength   Strength 5/5 except as noted.   Right deltoid: 4/5  Left deltoid: 4/5  Right biceps: 4/5  Left biceps: 4/5  Right triceps: 4/5  Left triceps: 4/5  Right interossei: 4/5  Left interossei: 4/5  Right iliopsoas: 4/5  Left iliopsoas: 4/5  Right quadriceps: 4/5  Left quadriceps: 4/5  Right anterior tibial: 4/5  Left anterior tibial: 4/5  Right posterior tibial: 4/5  Left posterior tibial: 4/5  Right gastroc: 4/5  Left gastroc: 4/5    Sensory Exam   Light touch normal.   Proprioception normal.     Gait, Coordination, and Reflexes     Gait  Gait: (nonambulatory)    Coordination   Finger to nose coordination: normal  Heel to shin coordination: normal  Tandem walking coordination: normal    Reflexes   Reflexes 2+ except as noted.   Right plantar: normal  Left plantar: normal  Right Kapoor: absent  Left Kapoor: absent      Results  Review:   Independent review and interpretation of imaging  Imaging Results (last 24 hours)     ** No results found for the last 24 hours. **          CT Head:  Noncontrast CT of the brain performed at outside hospital on 6/8/2018.  There is evidence of a left frontal and temporal mass associated with the sphenoid wing.  There is surrounding edema in both the left temporal horn and left frontal lobe.  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  The evidence of surrounding cerebral edema suggest either metastasis or high-grade conversion of meningioma.                I reviewed the patient's new clinical results.  Lab Results (last 24 hours)     ** No results found for the last 24 hours. **        Outside hospital EKG normal sinus rhythm.    Review of outside hospital labs.    INR 1.2, CK-MB 0.8, CPK 74, LDH 2100, troponin less than 0.02, digoxin level 0.57, glucose 147, B1 26, creatinine 0.5, BUN/creatinine ratio 52, sodium 138, potassium 4.5, alkaline phosphatase 744    WBC 9.2, hemoglobin 8.3, hematocrit 27, platelets 80.    Taj Anderson MD          Electronically signed by Taj Anderson MD at 6/8/2018 10:15 PM       Prior to Admission Medications     Prescriptions Last Dose Informant Patient Reported? Taking?    apixaban (ELIQUIS) 5 MG tablet tablet   No Yes    Take 1 tablet by mouth Every 12 (Twelve) Hours.    calcium carb-cholecalciferol (CALCIUM+D3) 600-800 MG-UNIT tablet   Yes Yes    Take 1 tablet by mouth Daily.    digoxin (LANOXIN) 125 MCG tablet   No Yes    Take 1 tablet by mouth Daily.    Patient taking differently:  Take 125 mcg by mouth Daily With Lunch.    estrogens, conjugated, (PREMARIN) 0.625 MG tablet   No Yes    Take 1 tablet by mouth Daily for 30 days. Take daily for 21 days then do not take for 7 days.    Patient taking differently:  Take 0.625 mg by mouth every night at bedtime. Take daily for 21  "days then do not take for 7 days.    furosemide (LASIX) 20 MG tablet   Yes Yes    Take 20 mg by mouth Daily.    metFORMIN ER (GLUCOPHAGE-XR) 500 MG 24 hr tablet   Yes Yes    Take 500 mg by mouth Daily With Breakfast.    metoprolol tartrate (LOPRESSOR) 50 MG tablet   No Yes    Take 1 tablet by mouth Every 12 (Twelve) Hours.    Multiple Vitamins-Minerals (CENTRUM SILVER PO)  Spouse/Significant Other Yes Yes    Take 1 tablet by mouth Daily.    potassium chloride (K-DUR) 10 MEQ CR tablet   Yes Yes    Take 10 mEq by mouth Daily With Lunch.    predniSONE (DELTASONE) 5 MG tablet   Yes Yes    Take 5 mg by mouth 2 (Two) Times a Day With Meals.    sennosides-docusate sodium (SENOKOT-S) 8.6-50 MG tablet   Yes Yes    Take 2 tablets by mouth 2 (Two) Times a Day.    valsartan (DIOVAN) 40 MG tablet   Yes Yes    Take 20 mg by mouth Daily With Lunch. Half tablet -20mg daily at 1200          [facility] Medications (active)       Dose Frequency Start End    apixaban (ELIQUIS) tablet 5 mg 5 mg Every 12 Hours Scheduled 6/8/2018     Sig - Route: Take 1 tablet by mouth Every 12 (Twelve) Hours. - Oral    calcium carb-cholecalciferol 600-800 MG-UNIT tablet 1 tablet 1 tablet Daily 6/9/2018     Sig - Route: Take 1 tablet by mouth Daily. - Oral    dexamethasone (DECADRON) injection 10 mg 10 mg Once 6/8/2018 6/8/2018    Sig - Route: Infuse 2.5 mL into a venous catheter 1 (One) Time. - Intravenous    Linked Group 1:  \"Followed by\" Linked Group Details        dexamethasone (DECADRON) injection 2 mg 2 mg Every 6 Hours 6/12/2018 6/15/2018    Sig - Route: Infuse 0.5 mL into a venous catheter Every 6 (Six) Hours. - Intravenous    Linked Group 1:  \"Followed by\" Linked Group Details        dexamethasone (DECADRON) injection 2 mg 2 mg Every 12 Hours 6/15/2018 6/18/2018    Sig - Route: Infuse 0.5 mL into a venous catheter Every 12 (Twelve) Hours. - Intravenous    Linked Group 1:  \"Followed by\" Linked Group Details        dexamethasone (DECADRON) injection " "4 mg 4 mg Every 6 Hours 6/9/2018 6/12/2018    Sig - Route: Infuse 1 mL into a venous catheter Every 6 (Six) Hours. - Intravenous    Linked Group 1:  \"Followed by\" Linked Group Details        dextrose (D50W) solution 25 g 25 g Every 15 Minutes PRN 6/8/2018     Sig - Route: Infuse 50 mL into a venous catheter Every 15 (Fifteen) Minutes As Needed for Low Blood Sugar (Blood Sugar Less Than 70). - Intravenous    dextrose (GLUTOSE) oral gel 15 g 15 g Every 15 Minutes PRN 6/8/2018     Sig - Route: Take 15 g by mouth Every 15 (Fifteen) Minutes As Needed for Low Blood Sugar (Blood sugar less than 70). - Oral    digoxin (LANOXIN) tablet 125 mcg 125 mcg Daily With Lunch 6/9/2018     Sig - Route: Take 1 tablet by mouth Daily With Lunch. - Oral    estrogens (conjugated) (PREMARIN) tablet 0.625 mg 0.625 mg Daily 6/9/2018 6/10/2018    Sig - Route: Take 1 tablet by mouth Daily. - Oral    famotidine (PEPCID) tablet 40 mg 40 mg Daily 6/9/2018     Sig - Route: Take 2 tablets by mouth Daily. - Oral    furosemide (LASIX) tablet 20 mg 20 mg Daily 6/9/2018     Sig - Route: Take 1 tablet by mouth Daily. - Oral    glucagon (human recombinant) (GLUCAGEN DIAGNOSTIC) injection 1 mg 1 mg As Needed 6/8/2018     Sig - Route: Inject 1 mg under the skin As Needed (Blood Glucose Less Than 70). - Subcutaneous    HYDROmorphone (DILAUDID) injection 0.5 mg 0.5 mg Every 3 Hours PRN 6/9/2018 6/19/2018    Sig - Route: Infuse 0.5 mL into a venous catheter Every 3 (Three) Hours As Needed for Severe Pain . - Intravenous    insulin lispro (humaLOG) injection 2-7 Units 2-7 Units 4 Times Daily With Meals & Nightly 6/8/2018     Sig - Route: Inject 2-7 Units under the skin 4 (Four) Times a Day With Meals & at Bedtime. - Subcutaneous    levETIRAcetam in NaCl 0.82% (KEPPRA) IVPB 500 mg 500 mg Every 12 Hours Scheduled 6/8/2018     Sig - Route: Infuse 100 mL into a venous catheter Every 12 (Twelve) Hours. - Intravenous    LORazepam (ATIVAN) injection 0.25 mg 0.25 mg " "Every 6 Hours PRN 6/8/2018 6/18/2018    Sig - Route: Infuse 0.125 mL into a venous catheter Every 6 (Six) Hours As Needed for Anxiety or Agitation. - Intravenous    metFORMIN ER (GLUCOPHAGE-XR) 24 hr tablet 500 mg 500 mg Daily With Breakfast 6/9/2018     Sig - Route: Take 1 tablet by mouth Daily With Breakfast. - Oral    metoprolol tartrate (LOPRESSOR) tablet 50 mg 50 mg Every 12 Hours Scheduled 6/8/2018     Sig - Route: Take 1 tablet by mouth Every 12 (Twelve) Hours. - Oral    ondansetron (ZOFRAN) injection 4 mg 4 mg Every 6 Hours PRN 6/8/2018     Sig - Route: Infuse 2 mL into a venous catheter Every 6 (Six) Hours As Needed for Nausea or Vomiting. - Intravenous    Linked Group 2:  \"Or\" Linked Group Details        ondansetron (ZOFRAN) tablet 4 mg 4 mg Every 6 Hours PRN 6/8/2018     Sig - Route: Take 1 tablet by mouth Every 6 (Six) Hours As Needed for Nausea or Vomiting. - Oral    Linked Group 2:  \"Or\" Linked Group Details        ondansetron ODT (ZOFRAN-ODT) disintegrating tablet 4 mg 4 mg Every 6 Hours PRN 6/8/2018     Sig - Route: Take 1 tablet by mouth Every 6 (Six) Hours As Needed for Nausea or Vomiting. - Oral    Linked Group 2:  \"Or\" Linked Group Details        potassium chloride (MICRO-K) CR capsule 10 mEq 10 mEq Daily With Lunch 6/9/2018     Sig - Route: Take 1 capsule by mouth Daily With Lunch. - Oral    sennosides-docusate sodium (SENOKOT-S) 8.6-50 MG tablet 2 tablet 2 tablet 2 Times Daily 6/8/2018     Sig - Route: Take 2 tablets by mouth 2 (Two) Times a Day. - Oral    sodium chloride 0.9 % flush 1-10 mL 1-10 mL As Needed 6/8/2018     Sig - Route: Infuse 1-10 mL into a venous catheter As Needed for Line Care. - Intravenous    sodium chloride 0.9 % with KCl 20 mEq/L infusion 100 mL/hr Continuous 6/8/2018     Sig - Route: Infuse 100 mL/hr into a venous catheter Continuous. - Intravenous    valsartan (DIOVAN) tablet 20 mg 20 mg Daily With Lunch 6/9/2018     Sig - Route: Take 0.5 tablets by mouth Daily With " Lunch. - Oral    HYDROmorphone (DILAUDID) tablet 2 mg (Discontinued) 2 mg Every 3 Hours PRN 6/8/2018 6/9/2018    Sig - Route: Take 1 tablet by mouth Every 3 (Three) Hours As Needed for Severe Pain . - Oral    predniSONE (DELTASONE) tablet 5 mg (Discontinued) 5 mg 2 Times Daily With Meals 6/8/2018 6/8/2018    Sig - Route: Take 1 tablet by mouth 2 (Two) Times a Day With Meals. - Oral    Reason for Discontinue: Alternate therapy          Operative/Procedure Notes (most recent note)     No notes of this type exist for this encounter.        Physician Progress Notes (all)     No notes of this type exist for this encounter.           Consult Notes (most recent note)      DONALD Saldaña at 6/8/2018 10:26 PM      Consult Orders:    1. Inpatient Hospitalist Consult [[id number]] ordered by Taj Anderson MD at 06/08/18 [facility]                      [facility] Medicine Consult Note    Date of Admission: 6/8/2018  Date of Consult: 06/08/18    Primary Care Physician: Hector Garcia MD PhD  Referring Physician: Taj Anderson MD    Chief complaint/Reason for consultation: Management of failure to thrive/dehydration    History of Present Illness  Jay Jones is an unfortunate 77-year-old  male with a past medical history of metastatic prostate cancer status post prostatectomy.  Patient's wife and daughter at bedside states he has been declining over the past 3-4 months significantly so in the past month with severe weight loss, anorexia and general decline.  Wife states he was in his usual state of health last evening was talking coherently and behaving normally and awakened this morning with inability to find his words, increased agitation therefore they did seek evaluation via EMS at [facility].  Evaluation revealed brain lesion therefore patient was transferred to [facility] into the care of Dr. Anderson, who feels patient may have sphenoid wing  meningioma versus metastatic prostate cancer to the brain.  Patient has been on estrogen for several months and this is a possible etiology of meningioma as metastatic prostate cancer to the brain is rare.  Invasive procedure is not an option and he has been placed on high-dose steroids and Keppra.  Hospitalists have been consulted for general management of failure to thrive and dehydration.  Wife wished to discuss hospice care, approximately 30 minutes spent in conversation with her and daughter at bedside.  Currently patient is extremely agitated when touched by anyone other than family.  He is unable to follow any commands.  Speech is garbled.  Condition is guarded.  Family wishes for his agitation and pain to be treated.  I did explain we would have to be very cautious with the use of narcotics due to concerns for oversedation.  They verbalized understanding and wish at this time for him to remain a full code until all family members have discussed with hospice care but would like mild therapy if possible.  Hospitalists will follow.    Review of Systems   Unable to determine due to profound confusion    Past Medical History:   Past Medical History:   Diagnosis Date   • Asthma     ASTHMA NOS W/ACUTE EXACERBATION   • CAD in native artery    • Cancer    • Coronary artery disease    • HLD (hyperlipidemia)     Hyperlipidemia, unspecified   • Infectious viral hepatitis     HEPATITIS A, VIRAL, W/O HEPATIC COMA   • Prostate CA 2/6/2018   • Prostate cancer metastatic to bone 2/6/2018   • Unspecified atrial flutter        Past Surgical History:   Past Surgical History:   Procedure Laterality Date   • APPENDECTOMY     • CARDIAC CATHETERIZATION Left 11/01/2006    -with findings of three-vessel coronary artery disease with normal left ventricular function.   • CHOLECYSTECTOMY     • CORONARY ARTERY BYPASS GRAFT  11/03/2006    CABG times five with LIMA grafted in a sequential fashion to the first diagonal and left anterior  descending followed by a saphenous vein graft to the first and second obtuse marginal branches and then an individual saphenous vein graft to the right coronary artery.   • OTHER SURGICAL HISTORY  02/28/2007    electrocardioversion   • PROSTATECTOMY         Code Status: Full, his wife speaks for him    Family History: family history includes Alzheimer's disease in his mother; Heart attack in his father; Heart disease in his father, maternal grandfather, and other; Heart failure in his father.    Social History:  reports that he quit smoking about 13 months ago. His smoking use included Cigars. He has never used smokeless tobacco. He reports that he does not drink alcohol or use drugs.    Allergies:   Allergies   Allergen Reactions   • Oxycodone-Acetaminophen Unknown (See Comments)     Reaction: hallucinations   • Codeine Unknown (See Comments) and GI Intolerance   • Percocet [Oxycodone-Acetaminophen]    • Tramadol Other (See Comments)     Increased BP and HR. PATIENT STATES HE HAS BEEN TAKING THIS DRUG THIS MONTH.       Home Medications:   Prior to Admission medications    Medication Sig Start Date End Date Taking? Authorizing Provider   apixaban (ELIQUIS) 5 MG tablet tablet Take 1 tablet by mouth Every 12 (Twelve) Hours. 5/27/18  Yes Khalida Pillai PA-C   calcium carb-cholecalciferol (CALCIUM+D3) 600-800 MG-UNIT tablet Take 1 tablet by mouth Daily.   Yes Historical Provider, MD   digoxin (LANOXIN) 125 MCG tablet Take 1 tablet by mouth Daily.  Patient taking differently: Take 125 mcg by mouth Daily With Lunch. 5/27/18  Yes Khalida Pillai PA-C   estrogens, conjugated, (PREMARIN) 0.625 MG tablet Take 1 tablet by mouth Daily for 30 days. Take daily for 21 days then do not take for 7 days.  Patient taking differently: Take 0.625 mg by mouth every night at bedtime. Take daily for 21 days then do not take for 7 days. 5/10/18 6/9/18 Yes Ash Ojeda MD   furosemide (LASIX) 20 MG tablet Take 20 mg by mouth  Daily.   Yes Historical Provider, MD   metFORMIN ER (GLUCOPHAGE-XR) 500 MG 24 hr tablet Take 500 mg by mouth Daily With Breakfast.   Yes Historical Provider, MD   metoprolol tartrate (LOPRESSOR) 50 MG tablet Take 1 tablet by mouth Every 12 (Twelve) Hours. 5/27/18  Yes Khalida Pillai PA-C   Multiple Vitamins-Minerals (CENTRUM SILVER PO) Take 1 tablet by mouth Daily.   Yes Historical Provider, MD   potassium chloride (K-DUR) 10 MEQ CR tablet Take 10 mEq by mouth Daily With Lunch.   Yes Historical Provider, MD   predniSONE (DELTASONE) 5 MG tablet Take 5 mg by mouth 2 (Two) Times a Day With Meals.   Yes Historical Provider, MD   sennosides-docusate sodium (SENOKOT-S) 8.6-50 MG tablet Take 2 tablets by mouth 2 (Two) Times a Day.   Yes Historical Provider, MD   valsartan (DIOVAN) 40 MG tablet Take 20 mg by mouth Daily With Lunch. Half tablet -20mg daily at 1200   Yes Historical Provider, MD   furosemide (LASIX) 40 MG tablet TAKE 1 TABLET BY MOUTH x 5 days, then 1/2 tab daily 11/8/16 6/8/18  Historical Provider, MD   KLOR-CON 10 MEQ CR tablet TAKE 1 TABLET DAILY 11/3/16 6/8/18  Historical Provider, MD   metFORMIN XR (GLUCOPHAGE-XR) 500 MG 24 hr tablet TAKE 1 TABLET TWICE A DAY 10/20/16 6/8/18  Historical Provider, MD   valsartan (DIOVAN) 40 MG tablet TAKE 1 TABLET EVERY DAY 11/8/16 6/8/18  Historical Provider, MD       Scheduled Medications    apixaban 5 mg Oral Q12H   [START ON 6/9/2018] calcium carb-cholecalciferol 1 tablet Oral Daily   dexamethasone 10 mg Intravenous Once   Followed by      [START ON 6/9/2018] dexamethasone 4 mg Intravenous Q6H   Followed by      [START ON 6/12/2018] dexamethasone 2 mg Intravenous Q6H   Followed by      [START ON 6/15/2018] dexamethasone 2 mg Intravenous Q12H   [START ON 6/9/2018] digoxin 125 mcg Oral Daily With Lunch   [START ON 6/9/2018] estrogens (conjugated) 0.625 mg Oral Daily   [START ON 6/9/2018] famotidine 40 mg Oral Daily   [START ON 6/9/2018] furosemide 20 mg Oral Daily    insulin lispro 2-7 Units Subcutaneous 4x Daily With Meals & Nightly   levETIRAcetam 500 mg Intravenous Q12H   [START ON 6/9/2018] metFORMIN  mg Oral Daily With Breakfast   metoprolol tartrate 50 mg Oral Q12H   [START ON 6/9/2018] potassium chloride 10 mEq Oral Daily With Lunch   sennosides-docusate sodium 2 tablet Oral BID   [START ON 6/9/2018] valsartan 20 mg Oral Daily With Lunch       Pertinent Data:   Lab Results (last 72 hours)     Procedure Component Value Units Date/Time    Comprehensive Metabolic Panel [[id number]]  (Abnormal) Collected:  06/08/18 2241    Specimen:  Blood Updated:  06/08/18 2319     Glucose 129 (H) mg/dL      BUN 27 (H) mg/dL      Creatinine 0.45 (L) mg/dL      Sodium 137 mmol/L      Potassium 4.7 mmol/L      Chloride 102 mmol/L      CO2 27.0 mmol/L      Calcium 6.6 (L) mg/dL      Total Protein 4.7 (L) g/dL      Albumin 2.50 (L) g/dL      ALT (SGPT) 25 U/L      AST (SGOT) 126 (H) U/L      Alkaline Phosphatase 639 (H) U/L      Total Bilirubin 0.5 mg/dL      eGFR Non African Amer >150 mL/min/1.73      Globulin 2.2 gm/dL      A/G Ratio 1.1 g/dL      BUN/Creatinine Ratio 60.0 (H)     Anion Gap 8.0 mmol/L     Narrative:       The MDRD GFR formula is only valid for adults with stable renal function between ages 18 and 70.    Blood Gas, Arterial With Co-Ox [[id number]]  (Abnormal) Collected:  06/08/18 2310    Specimen:  Arterial Blood Updated:  06/08/18 2313     Site Right Brachial     Lino's Test Positive     pH, Arterial 7.479 (H) pH units      pCO2, Arterial 31.0 (L) mm Hg      pO2, Arterial 66.7 (L) mm Hg      HCO3, Arterial 23.0 mmol/L      Base Excess, Arterial -0.3 (L) mmol/L      O2 Saturation, Arterial 94.9 %      Hemoglobin, Blood Gas 7.7 (L) g/dL      Hematocrit, Blood Gas 23.5 (L) %      Oxyhemoglobin 92.2 (L) %      Methemoglobin 0.90 %      Carboxyhemoglobin 2.0 %      Temperature 37.0 C      Sodium, Arterial 136 mmol/L      Potassium, Arterial 4.0 mmol/L      Ionized Calcium  3.77 (L) mg/dL      Barometric Pressure for Blood Gas 752 mmHg      Modality N/A     FIO2 21 %      Ventilator Mode NA     Collected by [id number]     pH, Temp Corrected -- pH Units      pCO2, Temperature Corrected -- mm Hg      pO2, Temperature Corrected -- mm Hg     Digoxin Level [[id number]]  (Abnormal) Collected:  06/08/18 2241    Specimen:  Blood Updated:  06/08/18 2311     Digoxin 0.70 (L) ng/mL     Lactic Acid, Plasma [[id number]]  (Normal) Collected:  06/08/18 2241    Specimen:  Blood Updated:  06/08/18 2311     Lactate 1.3 mmol/L     CBC Auto Differential [[id number]]  (Abnormal) Collected:  06/08/18 2241    Specimen:  Blood Updated:  06/08/18 2307     WBC 7.05 10*3/mm3      RBC 2.71 (L) 10*6/mm3      Hemoglobin 8.1 (L) g/dL      Hematocrit 26.5 (L) %      MCV 97.8 (H) fL      MCH 29.9 pg      MCHC 30.6 (L) g/dL      RDW 21.6 (H) %      RDW-SD 76.1 (H) fl      MPV 9.7 fL      Platelets 83 (L) 10*3/mm3     Lactate Dehydrogenase [[id number]] Collected:  06/08/18 2241    Specimen:  Blood Updated:  06/08/18 2300    Manual Differential [[id number]] Collected:  06/08/18 2241    Specimen:  Blood Updated:  06/08/18 2257        Imaging Results (last 24 hours)     ** No results found for the last 24 hours. **        Temp:  [99.4 °F (37.4 °C)] 99.4 °F (37.4 °C)  Heart Rate:  [101] 101  Resp:  [18] 18  BP: (105)/(47) 105/47    Physical Exam   Constitutional: He appears well-developed. He appears distressed.   Frail and cachectic   HENT:   Head: Normocephalic and atraumatic.   Dry oral mucosa   Eyes: Pupils are equal, round, and reactive to light. Scleral icterus is present.   Neck: Normal range of motion. No JVD present.   Cardiovascular: Regular rhythm and normal heart sounds.    No murmur heard.  Tachycardic   Pulmonary/Chest: Effort normal and breath sounds normal. No respiratory distress. He has no wheezes. He has no rales.   Abdominal: Soft. Bowel sounds are normal. He exhibits no distension.   Musculoskeletal: He exhibits  no edema.   Generalized weakness and debility   Neurological:   Confused, aphasic   Skin: Skin is warm and dry. Capillary refill takes less than 2 seconds.   Jaundice   Psychiatric:   Extreme agitation       Assessment:     Hospital Problem List     Prostate cancer metastatic to bone    Brain mass    Aphasia    Dysphagia    Dehydration    Thrombocytopenia    Prostate cancer      Transaminitis - concerns for liver metastases  Failure to thrive  End-of-life      Plan:   1.  Consult hospice in a.m.   2.  Add Ativan-low dose every 6 hours as needed for agitation  3.  Pain control with Dilaudid, per admitting  4.  Normal saline 20 mEq KCl 100 ML/hour  5.  Nasal cannula O2 at 2 L if patient will tolerate    I discussed the patients findings and my recommendations with: Shauna Hernandez MD  Time spent: 50 minutes    DONALD Esteban  06/08/18  10:27 PM            Electronically signed by DONALD Saldaña at 6/8/2018 11:30 PM       Physical Therapy Notes (most recent note)     No notes of this type exist for this encounter.        Occupational Therapy Notes (most recent note)     No notes of this type exist for this encounter.

## 2018-06-09 NOTE — THERAPY EVALUATION
"Acute Care - Speech Language Pathology   Swallow Initial Evaluation [facility]     Patient Name: Jay Jones  : 1940  MRN: [id number]  Today's Date: 2018               Admit Date: 2018       SPEECH-LANGUAGE PATHOLOGY EVALUATION - SWALLOW  Subjective: The patient was seen on this date for a Clinical Swallow evaluation.  Patient was somnolent, unable to follow commands, though occasionally able to open eyes and nod for \"yes\".  Objective: Not appropriate for PO trials at this time, though pt was provided labial ice chip stimulation and oral care via toothette.  Assessment: Open mouth posture in bed. Family stated pt became minimally responsive yesterday and has remained in such state. Reported occasional mumbling, yet no intelligible communication. Pt was presented labial ice chip stimulation x3, with hand movement to oppose with each attempt. Oral care then provided via water moistened toothette on multiple instances, with the pt tolerated without s/s of resistance. Lingual retraction despite max cues for protrusion. Only able to model on few instances when pt would open eyes, yet did not appear to visually attend to cues. Eventually, pt was able to complete a swallow x2 follow stim. 1x throat clear noted. Not appropriate for PO trials at this time. Educated wife and son on risks for aspiration and recommendation to remain NPO except for oral care via water moistened toothette. High risk for aspiration with PO intake.   SLP Findings:  Patient presents with profound oropharyngeal dysphagia, unable to r/o  esophageal component.   Recommendations: Diet Textures: NPO, ok for oral care via water moistened toothette in upright/semi-upright position.  Medications should be taken by alternate means.   Recommended Strategies: Oral care before breakfast, after all meals and PRN.  Other Recommended Evaluations: Re-evaluation at bedside if pt becomes more alert/appropriate.  Dysphagia therapy is recommended to " provide further support in suspected transition to a less aggressive treatment route. Rationale: See above. ST to follow and tx. Thanks!  Katy Owusu CCC-SLP 6/9/2018 10:28 AM    Visit Dx:     ICD-10-CM ICD-9-CM   1. Oropharyngeal dysphagia R13.12 787.22     Patient Active Problem List   Diagnosis   • HLD (hyperlipidemia)   • CAD (coronary artery disease)   • Atrial flutter   • Prostate cancer metastatic to bone   • Antineoplastic chemotherapy induced anemia   • Generalized edema   • Hypocalcemia   • Brain mass   • Aphasia   • Dysphagia   • Dehydration   • Thrombocytopenia   • Prostate cancer     Past Medical History:   Diagnosis Date   • Asthma     ASTHMA NOS W/ACUTE EXACERBATION   • CAD in native artery    • Cancer    • Coronary artery disease    • HLD (hyperlipidemia)     Hyperlipidemia, unspecified   • Infectious viral hepatitis     HEPATITIS A, VIRAL, W/O HEPATIC COMA   • Prostate CA 2/6/2018   • Prostate cancer metastatic to bone 2/6/2018   • Unspecified atrial flutter      Past Surgical History:   Procedure Laterality Date   • APPENDECTOMY     • CARDIAC CATHETERIZATION Left 11/01/2006    -with findings of three-vessel coronary artery disease with normal left ventricular function.   • CHOLECYSTECTOMY     • CORONARY ARTERY BYPASS GRAFT  11/03/2006    CABG times five with LIMA grafted in a sequential fashion to the first diagonal and left anterior descending followed by a saphenous vein graft to the first and second obtuse marginal branches and then an individual saphenous vein graft to the right coronary artery.   • OTHER SURGICAL HISTORY  02/28/2007    electrocardioversion   • PROSTATECTOMY            SWALLOW EVALUATION (last 72 hours)      SLP Adult Swallow Evaluation     Row Name 06/09/18 0931                   Rehab Evaluation    Document Type evaluation  -TM        Subjective Information weakness  -TM        Patient Observations lethargic  -TM        Patient/Family Observations Spouse, son at bedside.  " -TM        Patient Effort fair  -TM        Comment Unable to follow commands, able to nod \"yes\" at times.   -TM           General Information    Patient Profile Reviewed yes  -TM        Pertinent History Of Current Problem CXR 05/23/2018 Decreased left pleural effusion and opacity  -TM        Current Method of Nutrition NPO  -TM        Precautions/Limitations, Vision other (see comments)   Unable to discern  -TM        Precautions/Limitations, Hearing other (see comments)   Unable to fully determine, able to respond to ST verbal cues  -TM        Prior Level of Function-Communication cognitive-linguistic impairment  -TM        Prior Level of Function-Swallowing concerns regarding malnutrition;concerns regarding dehydration  -TM        Plans/Goals Discussed with spouse/S.O.;family  -TM        Barriers to Rehab medically complex;cognitive status  -TM        Patient's Goals for Discharge patient could not state   Lethargic  -TM        Family Goals for Discharge family did not state  -TM           Pain Assessment    Additional Documentation Pain Scale: FACES Pre/Post-Treatment (Group)  -TM           Pain Scale: FACES Pre/Post-Treatment    Pain: FACES Scale, Pretreatment 0-->no hurt  -TM        Pain: FACES Scale, Post-Treatment 0-->no hurt  -TM           Oral Motor and Function    Dentition Assessment natural, present and adequate  -TM        Secretion Management WNL/WFL  -TM        Mucosal Quality dry;other (see comments)   Open mouth posture at rest  -TM        Volitional Swallow unable to elicit  -TM        Volitional Cough unable to elicit  -TM           Oral Musculature and Cranial Nerve Assessment    Oral Motor General Assessment generalized oral motor weakness;other (see comments)   Pt unable to follow commands despite max cues, modeling  -TM           General Eating/Swallowing Observations    Respiratory Support Currently in Use other (see comments)   Room air   -TM           Clinical Swallow Eval    Oral Prep " Phase impaired  -TM        Oral Transit impaired  -TM        Pharyngeal Phase suspected pharyngeal impairment  -TM        Esophageal Phase --   Unable to discern  -TM        Clinical Swallow Evaluation Summary Bedside swallow eval completed. Pt was lethargic at time of ST arrival. Open mouth posture in bed. Family stated pt became minimally responsive yesterday and has remained in such state. Reported occasional mumbling, yet no intelligible communication. Pt was presented labial ice chip stimulation x3, with hand movement to oppose with each attempt. Oral care then provided via water moistened toothette on multiple instances, with the pt tolerated without s/s of resistance. Lingual retraction despite max cues for protrusion. Only able to model on few instances when pt would open eyes, yet did not appear to visually attend to cues. Eventually, pt was able to complete a swallow x2 follow stim. 1x throat clear noted. Not appropriate for PO trials at this time. Educated wife and son on risks for aspiration and recommendation to remain NPO except for oral care via water moistened toothette. High risk for aspiration with PO intake.   -TM           Oral Prep Concerns    Oral Prep Concerns incomplete bolus preparation  -TM        Incomplete Bolus Preparation thin;other (see comments)   Residue from water moistened toothette.  -TM           Oral Transit Concerns    Oral Transit Concerns unable to initiate oral transit  -TM           Pharyngeal Phase Concerns    Pharyngeal Phase Concerns throat clear  -TM        Throat Clear thin;other (see comments)   Residue from water moistened toothette  -TM           Clinical Impression    SLP Swallowing Diagnosis profound;oral dysfunction;suspected pharyngeal dysfunction  -TM        Functional Impact risk of aspiration/pneumonia;risk of malnutrition;risk of dehydration  -TM        Rehab Potential/Prognosis, Swallowing re-evaluate goals as necessary  -TM        Criteria for Skilled  Therapeutic Interventions Met demonstrates skilled criteria  -TM           Recommendations    Therapy Frequency (Swallow) PRN  -TM        Predicted Duration Therapy Intervention (Days) until discharge  -TM        SLP Diet Recommendation NPO;other (see comments)   Oral care  -TM        Recommended Diagnostics reassess via clinical swallow evaluation;other (see comments)   If pt becomes more alert/appropriate.  -TM        Recommended Precautions and Strategies other (see comments)   Upright/semi-upright during oral care   -TM        SLP Rec. for Method of Medication Administration meds via alternate route  -TM        Monitor for Signs of Aspiration yes;cough;gurgly voice;throat clearing;pneumonia;right lower lobe infiltrates  -TM        Anticipated Dischage Disposition other (see comments)   Anticipate home with hospice  -TM           Swallow Goals (SLP)    Oral Nutrition/Hydration Goal Selection (SLP) oral nutrition/hydration, SLP goal 1  -TM           Oral Nutrition/Hydration Goal 1 (SLP)    Oral Nutrition/Hydration Goal 1, SLP Pt will tolerate LRD for pt comfort without increased lung congestion.   -TM        Time Frame (Oral Nutrition/Hydration Goal 1, SLP) by discharge  -TM        Barriers (Oral Nutrition/Hydration Goal 1, SLP) Cognition, weakness, lethargy  -TM        Progress/Outcomes (Oral Nutrition/Hydration Goal 1, SLP) goal ongoing  -TM          User Key  (r) = Recorded By, (t) = Taken By, (c) = Cosigned By    Initials Name Effective Dates    TM Katy Owusu CCC-SLP 08/02/16 -         EDUCATION  The patient has been educated in the following areas:   Dysphagia (Swallowing Impairment).    SLP Recommendation and Plan  SLP Swallowing Diagnosis: profound, oral dysfunction, suspected pharyngeal dysfunction  SLP Diet Recommendation: NPO, other (see comments) (Oral care)  Recommended Precautions and Strategies: other (see comments) (Upright/semi-upright during oral care )     Monitor for Signs of Aspiration:  yes, cough, gurgly voice, throat clearing, pneumonia, right lower lobe infiltrates  Recommended Diagnostics: reassess via clinical swallow evaluation, other (see comments) (If pt becomes more alert/appropriate.)  Criteria for Skilled Therapeutic Interventions Met: demonstrates skilled criteria  Anticipated Dischage Disposition: other (see comments) (Anticipate home with hospice)  Rehab Potential/Prognosis, Swallowing: re-evaluate goals as necessary  Therapy Frequency (Swallow): PRN  Predicted Duration Therapy Intervention (Days): until discharge       Plan of Care Reviewed With: spouse, son, other (see comments) (RNFaviola)  Plan of Care Review  Plan of Care Reviewed With: spouse, son, other (see comments) (Faviola TONG)  Progress:  (Eval)  Outcome Summary: Bedside swallow eval completed. Pt was lethargic at time of ST arrival. Open mouth posture in bed. Family stated pt became minimally responsive yesterday and has remained in such state. Reported occasional mumbling, yet no intelligible communication. Pt was presented labial ice chip stimulation x3, with hand movement to oppose with each attempt. Oral care then provided via water moistened toothette on multiple instances, with the pt tolerated without s/s of resistance. Lingual retraction despite max cues for protrusion. Only able to model on few instances when pt would open eyes, yet did not appear to visually attend to cues. Eventually, pt was able to complete a swallow x2 follow stim. 1x throat clear noted. Not appropriate for PO trials at this time. Educated wife and son on risks for aspiration and recommendation to remain NPO except for oral care via water moistened toothette. High risk for aspiration with PO intake. RECOMMENDATIONS: Continue NPO, oral care via water moistened toothette in semi-upright/upright position for pt comfort, no PO meds, RN to monitor for increased lung congestion. ST to monitor for further recommendations in re: to intake for  comfort, as pt will potentially be discharging home with hospice.           SLP GOALS     Row Name 06/09/18 0931             Oral Nutrition/Hydration Goal 1 (SLP)    Oral Nutrition/Hydration Goal 1, SLP Pt will tolerate LRD for pt comfort without increased lung congestion.   -TM      Time Frame (Oral Nutrition/Hydration Goal 1, SLP) by discharge  -TM      Barriers (Oral Nutrition/Hydration Goal 1, SLP) Cognition, weakness, lethargy  -TM      Progress/Outcomes (Oral Nutrition/Hydration Goal 1, SLP) goal ongoing  -TM        User Key  (r) = Recorded By, (t) = Taken By, (c) = Cosigned By    Initials Name Provider Type    TM Katy Owusu CCC-SLP Speech and Language Pathologist             SLP Outcome Measures (last 72 hours)      SLP Outcome Measures     Row Name 06/09/18 1022             SLP Outcome Measures    Outcome Measure Used? Adult NOMS  -TM         FCM Scores    FCM Chosen Swallowing  -TM      Swallowing FCM Score 1  -TM        User Key  (r) = Recorded By, (t) = Taken By, (c) = Cosigned By    Initials Name Effective Dates    TM KIN De La oRsa 08/02/16 -            Time Calculation:         Time Calculation- SLP     Row Name 06/09/18 1022             Time Calculation- SLP    SLP Start Time 0931  -TM      SLP Stop Time 1026  -TM      SLP Time Calculation (min) 55 min  -TM      SLP Received On 06/09/18  -TM      SLP Goal Re-Cert Due Date 06/19/18  -TM        User Key  (r) = Recorded By, (t) = Taken By, (c) = Cosigned By    Initials Name Provider Type    TM KIN De La Rosa Speech and Language Pathologist          Therapy Charges for Today     Code Description Service Date Service Provider Modifiers Qty    94719756597 HC ST SWALLOWING CURRENT STATUS 6/9/2018 KIN De La Rosa, CN 1    [id number] HC ST SWALLOWING PROJECTED 6/9/2018 KIN De La Rosa, CM 1    [id number] HC ST EVAL ORAL PHARYNG SWALLOW 4 6/9/2018 KIN De La Rosa GN 1          SLP G-Codes  SLP NOMS Used?:  Yes  Functional Limitations: Swallowing  Swallow Current Status (): 100 percent impaired, limited or restricted  Swallow Goal Status (): At least 80 percent but less than 100 percent impaired, limited or restricted    Katy Owusu CCC-SLP  6/9/2018

## 2018-06-09 NOTE — PROGRESS NOTES
Neurosurgery Daily Progress Note    Assessment:   Jay Jones is a 77 y.o. yo with a significant PMH of metastatic prostate cancer and newly identified intercranial mass.    DDX:   Active Problems:    Brain mass    Prostate cancer metastatic to bone    Aphasia    Dysphagia    Dehydration    Thrombocytopenia    Prostate cancer    Patient Active Problem List   Diagnosis   • HLD (hyperlipidemia)   • CAD (coronary artery disease)   • Atrial flutter   • Prostate cancer metastatic to bone   • Antineoplastic chemotherapy induced anemia   • Generalized edema   • Hypocalcemia   • Brain mass   • Aphasia   • Dysphagia   • Dehydration   • Thrombocytopenia   • Prostate cancer       Plan:   Neuro: Neurologically stable with somnolence and a fascia.   Keppra and Decadron.   No role for radiation or surgery.   Family does not desire MRI   Fentanyl patch for pain control given puritis with dilaudid  Defer hospice care to hospitalist and oncology.      Chief complaint:   Spine pain    Subjective  None    Temp:  [97 °F (36.1 °C)-99.4 °F (37.4 °C)] 98.9 °F (37.2 °C)  Heart Rate:  [100-113] 104  Resp:  [10-18] 10  BP: ()/(40-47) 99/42    Objective    Neurologic Exam     Mental Status   Speech: slurred   Level of consciousness: drowsy  Mummble, nonintelligble     Cranial Nerves     CN III, IV, VI   Pupils are equal, round, and reactive to light.  Extraocular motions are normal.     CN V   Facial sensation intact.     CN VII   Facial expression full, symmetric.     Motor Exam   Right arm pronator drift: absent  Left arm pronator drift: absent    Strength   Right deltoid: 4/5  Left deltoid: 4/5  Right biceps: 4/5  Left biceps: 4/5  Right triceps: 4/5  Left triceps: 4/5  Right iliopsoas: 4/5  Left iliopsoas: 4/5  Right quadriceps: 4/5  Left quadriceps: 4/5  Right gastroc: 4/5  Left gastroc: 4/5    Sensory Exam   Light touch normal.       Drains:      Imaging Results (last 24 hours)     ** No results found for the last 24 hours. **         Lab Results (last 24 hours)     Procedure Component Value Units Date/Time    POC Glucose Once [[id number]]  (Abnormal) Collected:  06/09/18 0858    Specimen:  Blood Updated:  06/09/18 0909     Glucose 158 (H) mg/dL      Comment: : [id number] Fadia Mcneilleter ID: [id number]       POC Glucose Once [[id number]]  (Abnormal) Collected:  06/08/18 2347    Specimen:  Blood Updated:  06/08/18 2358     Glucose 147 (H) mg/dL      Comment: : [id number] Meghan Gore ID: [id number]       Lactate Dehydrogenase [[id number]]  (Abnormal) Collected:  06/08/18 2241    Specimen:  Blood Updated:  06/08/18 2347     LDH 5,614 (H) U/L     CBC Auto Differential [[id number]]  (Abnormal) Collected:  06/08/18 2241    Specimen:  Blood Updated:  06/08/18 2324     WBC 7.05 10*3/mm3      RBC 2.71 (L) 10*6/mm3      Hemoglobin 8.1 (L) g/dL      Hematocrit 26.5 (L) %      MCV 97.8 (H) fL      MCH 29.9 pg      MCHC 30.6 (L) g/dL      RDW 21.6 (H) %      RDW-SD 76.1 (H) fl      MPV 9.7 fL      Platelets 83 (L) 10*3/mm3     Manual Differential [[id number]]  (Abnormal) Collected:  06/08/18 2241    Specimen:  Blood Updated:  06/08/18 2324     Neutrophil % 69.5 %      Lymphocyte % 15.8 %      Monocyte % 6.3 %      Bands %  8.4 %      Neutrophils Absolute 5.49 10*3/mm3      Lymphocytes Absolute 1.11 10*3/mm3      Monocytes Absolute 0.44 10*3/mm3      nRBC 6.3 (H) /100 WBC      Anisocytosis Slight/1+     Dacrocytes Slight/1+     Microcytes Slight/1+     Ovalocytes Slight/1+     Poikilocytes Slight/1+     Polychromasia Slight/1+     WBC Morphology Normal     Platelet Estimate Decreased    Comprehensive Metabolic Panel [[id number]]  (Abnormal) Collected:  06/08/18 2241    Specimen:  Blood Updated:  06/08/18 2319     Glucose 129 (H) mg/dL      BUN 27 (H) mg/dL      Creatinine 0.45 (L) mg/dL      Sodium 137 mmol/L      Potassium 4.7 mmol/L      Chloride 102 mmol/L      CO2 27.0 mmol/L      Calcium 6.6 (L) mg/dL      Total Protein 4.7 (L)  g/dL      Albumin 2.50 (L) g/dL      ALT (SGPT) 25 U/L      AST (SGOT) 126 (H) U/L      Alkaline Phosphatase 639 (H) U/L      Total Bilirubin 0.5 mg/dL      eGFR Non African Amer >150 mL/min/1.73      Globulin 2.2 gm/dL      A/G Ratio 1.1 g/dL      BUN/Creatinine Ratio 60.0 (H)     Anion Gap 8.0 mmol/L     Narrative:       The MDRD GFR formula is only valid for adults with stable renal function between ages 18 and 70.    Blood Gas, Arterial With Co-Ox [[id number]]  (Abnormal) Collected:  06/08/18 2310    Specimen:  Arterial Blood Updated:  06/08/18 2313     Site Right Brachial     Lino's Test Positive     pH, Arterial 7.479 (H) pH units      pCO2, Arterial 31.0 (L) mm Hg      pO2, Arterial 66.7 (L) mm Hg      HCO3, Arterial 23.0 mmol/L      Base Excess, Arterial -0.3 (L) mmol/L      O2 Saturation, Arterial 94.9 %      Hemoglobin, Blood Gas 7.7 (L) g/dL      Hematocrit, Blood Gas 23.5 (L) %      Oxyhemoglobin 92.2 (L) %      Methemoglobin 0.90 %      Carboxyhemoglobin 2.0 %      Temperature 37.0 C      Sodium, Arterial 136 mmol/L      Potassium, Arterial 4.0 mmol/L      Ionized Calcium 3.77 (L) mg/dL      Barometric Pressure for Blood Gas 752 mmHg      Modality N/A     FIO2 21 %      Ventilator Mode NA     Collected by [id number]     pH, Temp Corrected -- pH Units      pCO2, Temperature Corrected -- mm Hg      pO2, Temperature Corrected -- mm Hg     Digoxin Level [[id number]]  (Abnormal) Collected:  06/08/18 2241    Specimen:  Blood Updated:  06/08/18 2311     Digoxin 0.70 (L) ng/mL     Lactic Acid, Plasma [[id number]]  (Normal) Collected:  06/08/18 2241    Specimen:  Blood Updated:  06/08/18 2311     Lactate 1.3 mmol/L           [id number]  Taj Anderson MD

## 2018-06-09 NOTE — PLAN OF CARE
Problem: Patient Care Overview  Goal: Plan of Care Review  Outcome: Ongoing (interventions implemented as appropriate)   06/09/18 1020   Coping/Psychosocial   Plan of Care Reviewed With spouse;son;other (see comments)  (RN, Faviola)   Plan of Care Review   Progress (Eval)   OTHER   Outcome Summary Bedside swallow eval completed. Pt was lethargic at time of ST arrival. Open mouth posture in bed. Family stated pt became minimally responsive yesterday and has remained in such state. Reported occasional mumbling, yet no intelligible communication. Pt was presented labial ice chip stimulation x3, with hand movement to oppose with each attempt. Oral care then provided via water moistened toothette on multiple instances, with the pt tolerated without s/s of resistance. Lingual retraction despite max cues for protrusion. Only able to model on few instances when pt would open eyes, yet did not appear to visually attend to cues. Eventually, pt was able to complete a swallow x2 follow stim. 1x throat clear noted. Not appropriate for PO trials at this time. Educated wife and son on risks for aspiration and recommendation to remain NPO except for oral care via water moistened toothette. High risk for aspiration with PO intake. RECOMMENDATIONS: Continue NPO, oral care via water moistened toothette in semi-upright/upright position for pt comfort, no PO meds, RN to monitor for increased lung congestion. ST to monitor for further recommendations in re: to intake for comfort, as pt will potentially be discharging home with hospice.

## 2018-06-09 NOTE — PLAN OF CARE
Problem: Patient Care Overview  Goal: Plan of Care Review  Outcome: Ongoing (interventions implemented as appropriate)   06/09/18 1610   Coping/Psychosocial   Plan of Care Reviewed With patient;spouse;family   Plan of Care Review   Progress no change   OTHER   Outcome Summary Patient has been talking some. Patient has appeared to be sleeping during this shift. Right arm fentanyl patch. Family at bedside. NPO. Glucose monitoring. Hospice nurse spoke with family today and they decided to pursue home with hospice. Changed to conditional code today. Will continue to monitor.      Goal: Individualization and Mutuality  Outcome: Ongoing (interventions implemented as appropriate)    Goal: Discharge Needs Assessment  Outcome: Ongoing (interventions implemented as appropriate)    Goal: Interprofessional Rounds/Family Conf  Outcome: Ongoing (interventions implemented as appropriate)      Problem: Oncology Care (Adult)  Goal: Signs and Symptoms of Listed Potential Problems Will be Absent, Minimized or Managed (Oncology Care)  Outcome: Ongoing (interventions implemented as appropriate)      Problem: Fall Risk (Adult)  Goal: Identify Related Risk Factors and Signs and Symptoms  Outcome: Ongoing (interventions implemented as appropriate)    Goal: Absence of Fall  Outcome: Ongoing (interventions implemented as appropriate)      Problem: Skin Injury Risk (Adult)  Goal: Identify Related Risk Factors and Signs and Symptoms  Outcome: Ongoing (interventions implemented as appropriate)    Goal: Skin Health and Integrity  Outcome: Ongoing (interventions implemented as appropriate)

## 2018-06-10 NOTE — PROGRESS NOTES
Continued Stay Note  CLAUDIO Wright     Patient Name: Jay Jones  MRN: [id number]  Today's Date: 6/10/2018    Admit Date: 6/8/2018          Discharge Plan     Row Name 06/10/18 1252       Plan    Patient/Family in Agreement with Plan yes    Final Discharge Disposition Code 50 - home with hospice    Final Note Pt is ready for dc home.  SW spoke to [facility] who stated they could not transport.  SW spoke to Pasquale at [facility].  They will pick pt up in 30 mins.  NAS also informed Hospice that pt was dc.  ROLF Tyler.              Discharge Codes    No documentation.       Expected Discharge Date and Time     Expected Discharge Date Expected Discharge Time    Sanjeev 10, 2018             ROLF Castillo

## 2018-06-10 NOTE — DISCHARGE SUMMARY
[facility] Medicine Services  DISCHARGE SUMMARY       Date of Admission: 6/8/2018  Date of Discharge:  6/10/2018  Primary Care Physician: Hector Garcia MD PhD    Discharge Diagnoses:  Patient Active Problem List   Diagnosis   • HLD (hyperlipidemia)   • CAD (coronary artery disease)   • Atrial flutter   • Prostate cancer metastatic to bone   • Antineoplastic chemotherapy induced anemia   • Generalized edema   • Hypocalcemia   • Brain mass   • Aphasia   • Dysphagia   • Dehydration   • Thrombocytopenia   • Prostate cancer         Presenting Problem/History of Present Illness:  Prostate cancer [C61]     Chief Complaint on Day of Discharge:   None    History of Present Illness on Day of Discharge:   The patient is awake, alert and talkative today.  He was able to tolerate a soft diet without difficulty.  After discussion again with the family they are still in agreement that the patient is to return home with hospice.    Hospital Course  Patient is a 77 y.o. male presented with confusion and aphasia.  Physical exam findings of word salad, shortness of breath and cachexia were noted.  Imaging from outside hospital showed a newly discovered left sphenoid wing mass with surrounding edema.  The patient was placed on high-dose IV steroids and Keppra.  No further imaging or treatment was administered as the family decided not to change comfort measures treatment strategies.  The patient was initially admitted to the neurosurgical service with a consultation to oncology.  Oncology visited the patient and had no further treatment options to discuss with the family.  Hospitalist service was consulted for medical management and accepted the patient in transfer to our services.  I had a long discussions with the family on several occasions and they elected to proceed with hospice placement and to return the patient home for end-of-life care.  The patient had been essentially obtunded  throughout his stay until the day of discharge.  Became alert, awake and was talkative.  He tolerated a soft diet without difficulty.  A repeat discussion was held with the family and they were still in agreement that patient is going to return home with hospice as all treatment options have been completely exhausted.    Consults:   Oncology    Pertinent Test Results:    Urinalysis, Microscopic Only - Urine, Clean Catch [[id number]]  (Abnormal) Collected:  06/09/18 2153    Specimen:  Urine from Urine, Clean Catch Updated:  06/09/18 2237     RBC, UA 3-5 (A) /HPF      WBC, UA 13-20 (A) /HPF      Bacteria, UA Trace (A) /HPF      Squamous Epithelial Cells, UA 0-2 /HPF      Yeast, UA Small/1+ Yeast /HPF      Hyaline Casts, UA None Seen /LPF      Mucus, UA Trace /HPF      Methodology Manual Light Microscopy    Urinalysis With / Microscopic If Indicated (No Culture) - Urine, Clean Catch [[id number]]  (Abnormal) Collected:  06/09/18 2153    Specimen:  Urine from Urine, Clean Catch Updated:  06/09/18 2227     Color, UA Yellow     Appearance, UA Clear     pH, UA 6.0     Specific Gravity, UA 1.025     Glucose, UA Negative     Ketones, UA 15 mg/dL (1+) (A)     Bilirubin, UA Small (1+) (A)     Blood, UA Negative     Protein, UA 30 mg/dL (1+) (A)     Leuk Esterase, UA Trace (A)     Nitrite, UA Negative     Urobilinogen, UA 1.0 E.U./dL    Lactate Dehydrogenase [[id number]]  (Abnormal) Collected:  06/08/18 2241    Specimen:  Blood Updated:  06/08/18 2347     LDH 5,614 (H) U/L     CBC Auto Differential [[id number]]  (Abnormal) Collected:  06/08/18 2241    Specimen:  Blood Updated:  06/08/18 2324     WBC 7.05 10*3/mm3      RBC 2.71 (L) 10*6/mm3      Hemoglobin 8.1 (L) g/dL      Hematocrit 26.5 (L) %      MCV 97.8 (H) fL      MCH 29.9 pg      MCHC 30.6 (L) g/dL      RDW 21.6 (H) %      RDW-SD 76.1 (H) fl      MPV 9.7 fL      Platelets 83 (L) 10*3/mm3     Manual Differential [[id number]]  (Abnormal) Collected:  06/08/18 1865    Specimen:   Blood Updated:  06/08/18 2324     Neutrophil % 69.5 %      Lymphocyte % 15.8 %      Monocyte % 6.3 %      Bands %  8.4 %      Neutrophils Absolute 5.49 10*3/mm3      Lymphocytes Absolute 1.11 10*3/mm3      Monocytes Absolute 0.44 10*3/mm3      nRBC 6.3 (H) /100 WBC      Anisocytosis Slight/1+     Dacrocytes Slight/1+     Microcytes Slight/1+     Ovalocytes Slight/1+     Poikilocytes Slight/1+     Polychromasia Slight/1+     WBC Morphology Normal     Platelet Estimate Decreased    Comprehensive Metabolic Panel [[id number]]  (Abnormal) Collected:  06/08/18 2241    Specimen:  Blood Updated:  06/08/18 2319     Glucose 129 (H) mg/dL      BUN 27 (H) mg/dL      Creatinine 0.45 (L) mg/dL      Sodium 137 mmol/L      Potassium 4.7 mmol/L      Chloride 102 mmol/L      CO2 27.0 mmol/L      Calcium 6.6 (L) mg/dL      Total Protein 4.7 (L) g/dL      Albumin 2.50 (L) g/dL      ALT (SGPT) 25 U/L      AST (SGOT) 126 (H) U/L      Alkaline Phosphatase 639 (H) U/L      Total Bilirubin 0.5 mg/dL      eGFR Non African Amer >150 mL/min/1.73      Globulin 2.2 gm/dL      A/G Ratio 1.1 g/dL      BUN/Creatinine Ratio 60.0 (H)     Anion Gap 8.0 mmol/L     Narrative:       The MDRD GFR formula is only valid for adults with stable renal function between ages 18 and 70.    Blood Gas, Arterial With Co-Ox [[id number]]  (Abnormal) Collected:  06/08/18 2310    Specimen:  Arterial Blood Updated:  06/08/18 2313     Site Right Brachial     Lino's Test Positive     pH, Arterial 7.479 (H) pH units      pCO2, Arterial 31.0 (L) mm Hg      pO2, Arterial 66.7 (L) mm Hg      HCO3, Arterial 23.0 mmol/L      Base Excess, Arterial -0.3 (L) mmol/L      O2 Saturation, Arterial 94.9 %      Hemoglobin, Blood Gas 7.7 (L) g/dL      Hematocrit, Blood Gas 23.5 (L) %      Oxyhemoglobin 92.2 (L) %      Methemoglobin 0.90 %      Carboxyhemoglobin 2.0 %      Temperature 37.0 C      Sodium, Arterial 136 mmol/L      Potassium, Arterial 4.0 mmol/L      Ionized Calcium 3.77 (L)  "mg/dL      Barometric Pressure for Blood Gas 752 mmHg      Modality N/A     FIO2 21 %      Ventilator Mode NA     Collected by [id number]     pH, Temp Corrected -- pH Units      pCO2, Temperature Corrected -- mm Hg      pO2, Temperature Corrected -- mm Hg     Digoxin Level [[id number]]  (Abnormal) Collected:  06/08/18 2241    Specimen:  Blood Updated:  06/08/18 2311     Digoxin 0.70 (L) ng/mL     Lactic Acid, Plasma [[id number]]  (Normal) Collected:  06/08/18 2241    Specimen:  Blood Updated:  06/08/18 2311     Lactate 1.3 mmol/L         Condition on Discharge:    Stable    Physical Exam on Discharge:  BP (!) 104/39 (BP Location: Right arm, Patient Position: Lying)   Pulse 82   Temp 97.9 °F (36.6 °C) (Axillary)   Resp 18   Ht 155.4 cm (61.2\")   Wt 61.6 kg (135 lb 14.4 oz)   SpO2 94%   BMI 25.51 kg/m²   Physical Exam  Constitutional: He appears well-developed. He appears cachectic. He is easily aroused. He has a sickly appearance.  No distress.   HENT:   Head: Normocephalic and atraumatic.   Nose: Nose normal.   Mouth/Throat: Oropharynx is clear and moist. Mucous membranes are pale and dry.   Eyes: Conjunctivae are normal. Scleral icterus is present.   Neck: No JVD present. No tracheal deviation present. No thyromegaly present.   Cardiovascular: Regular rhythm and normal heart sounds.     Pulmonary/Chest: Effort normal and breath sounds normal. No respiratory distress.   Abdominal: Soft. Bowel sounds are normal. He exhibits no distension. There is no tenderness.   Musculoskeletal: He exhibits no edema.   Neurological: He is awake and talkative and is oriented to person and place.  Skin: Skin is dry. No rash noted.     Discharge Disposition:  Home or Self Care    Discharge Medications:   Jay Jones   Home Medication Instructions FELIPE:[id number]    Printed on:06/10/18 1137   Medication Information                      apixaban (ELIQUIS) 5 MG tablet tablet  Take 1 tablet by mouth Every 12 (Twelve) Hours.           "   digoxin (LANOXIN) 125 MCG tablet  Take 1 tablet by mouth Daily.             fentaNYL (DURAGESIC) 12 MCG/HR  Place 1 patch on the skin Every 72 (Seventy-Two) Hours for 3 doses.             furosemide (LASIX) 20 MG tablet  Take 20 mg by mouth Daily.             metFORMIN ER (GLUCOPHAGE-XR) 500 MG 24 hr tablet  Take 500 mg by mouth Daily With Breakfast.             metoprolol tartrate (LOPRESSOR) 50 MG tablet  Take 1 tablet by mouth Every 12 (Twelve) Hours.             Multiple Vitamins-Minerals (CENTRUM SILVER PO)  Take 1 tablet by mouth Daily.             potassium chloride (K-DUR) 10 MEQ CR tablet  Take 10 mEq by mouth Daily With Lunch.             PredniSONE (DELTASONE) 10 MG (48) tablet pack  As directed             sennosides-docusate sodium (SENOKOT-S) 8.6-50 MG tablet  Take 2 tablets by mouth 2 (Two) Times a Day.             valsartan (DIOVAN) 40 MG tablet  Take 20 mg by mouth Daily With Lunch. Half tablet -20mg daily at 1200                 Discharge Diet:   Diet Instructions     Advance Diet As Tolerated             Discharge Care Plan / Instructions:   Discharge home with hospice    Activity at Discharge:   Activity Instructions     Activity as Tolerated              Angel Guzman DO  06/10/18  11:37 AM    Time: Discharge Less than 30 min    Please note that portions of this note may have been completed with a voice recognition program. Efforts were made to edit the dictations, but occasionally words are mistranscribed.

## 2018-06-10 NOTE — THERAPY DISCHARGE NOTE
Acute Care - Speech Language Pathology   Swallow Eval/Discharge  [place]     Patient Name: Jay Jones  : 1940  MRN: [id number]  Today's Date: 6/10/2018               Admit Date: 2018    Visit Dx:    ICD-10-CM ICD-9-CM   1. Oropharyngeal dysphagia R13.12 787.22     Patient Active Problem List   Diagnosis   • HLD (hyperlipidemia)   • CAD (coronary artery disease)   • Atrial flutter   • Prostate cancer metastatic to bone   • Antineoplastic chemotherapy induced anemia   • Generalized edema   • Hypocalcemia   • Brain mass   • Aphasia   • Dysphagia   • Dehydration   • Thrombocytopenia   • Prostate cancer     Past Medical History:   Diagnosis Date   • Asthma     ASTHMA NOS W/ACUTE EXACERBATION   • CAD in native artery    • Cancer    • Coronary artery disease    • HLD (hyperlipidemia)     Hyperlipidemia, unspecified   • Infectious viral hepatitis     HEPATITIS A, VIRAL, W/O HEPATIC COMA   • Prostate CA 2018   • Prostate cancer metastatic to bone 2018   • Unspecified atrial flutter      Past Surgical History:   Procedure Laterality Date   • APPENDECTOMY     • CARDIAC CATHETERIZATION Left 2006    -with findings of three-vessel coronary artery disease with normal left ventricular function.   • CHOLECYSTECTOMY     • CORONARY ARTERY BYPASS GRAFT  2006    CABG times five with LIMA grafted in a sequential fashion to the first diagonal and left anterior descending followed by a saphenous vein graft to the first and second obtuse marginal branches and then an individual saphenous vein graft to the right coronary artery.   • OTHER SURGICAL HISTORY  2007    electrocardioversion   • PROSTATECTOMY       Patient was reevaluated at bedside per RN/Family request to determine if diet could be safely started. Patient is now alert and able to fully participate.  Patient given a full range of consistencies. Patient tolerated trials of thin, nectar, honey, pudding and regular solids consistencies with no  "signs/symptoms of aspiration.  Patient did have somewhat weak mastication of cracker. Patient and family counseled on feeding softer foods to prevent fatigue. Pt ok for regular/soft diet with thin liquids. OK for meds in thin water. Patient/family given education on oral care. Patient only to eat when alert.   SLP will upgrade diet consistency to mechanical soft diet due to generalized weakness and risk for fatigue and thin liquids with water or any preferred liquids between meals.  Please observe for any signs of swallowing difficulty including coughing, wet voice and increased lung congestion and notify SLP if any of the above observed. Patient is discharged from therapy at this time. He is scheduled to go home with hospice today.        SWALLOW EVALUATION (last 72 hours)      SLP Adult Swallow Evaluation     Row Name 06/10/18 1045 06/09/18 0931                Rehab Evaluation    Document Type  -- evaluation  -TM       Subjective Information no complaints  -KG weakness  -TM       Patient Observations alert;cooperative;agree to therapy  -KG lethargic  -TM       Patient/Family Observations Spouse and son present  -KG Spouse, son at bedside.  -TM       Patient Effort good  -KG fair  -TM       Comment  -- Unable to follow commands, able to nod \"yes\" at times.   -TM          General Information    Patient Profile Reviewed yes  -KG yes  -TM       Pertinent History Of Current Problem Prostate CA, metastatic to bone, pt was not alert for PO yesterday.   -KG CXR 05/23/2018 Decreased left pleural effusion and opacity  -TM       Current Method of Nutrition NPO  -KG NPO  -TM       Precautions/Limitations, Vision WFL;for purposes of eval  -KG other (see comments)   Unable to discern  -TM       Precautions/Limitations, Hearing WFL;for purposes of eval  -KG other (see comments)   Unable to fully determine, able to respond to ST verbal cues  -TM       Prior Level of Function-Communication WFL  -KG cognitive-linguistic impairment  " -TM       Prior Level of Function-Swallowing concerns regarding malnutrition;concerns regarding dehydration  -KG concerns regarding malnutrition;concerns regarding dehydration  -TM       Plans/Goals Discussed with patient;spouse/S.O.;family  -KG spouse/S.O.;family  -TM       Barriers to Rehab medically complex  -KG medically complex;cognitive status  -TM       Patient's Goals for Discharge return to PO diet;return to regular diet  -KG patient could not state   Lethargic  -TM       Family Goals for Discharge patient able to return to regular diet  -KG family did not state  -TM          Pain Assessment    Additional Documentation Pain Scale: Numbers Pre/Post-Treatment (Group)  -KG Pain Scale: FACES Pre/Post-Treatment (Group)  -TM          Pain Scale: Numbers Pre/Post-Treatment    Pain Scale: Numbers, Pretreatment 0/10 - no pain  -KG  --       Pain Scale: Numbers, Post-Treatment 0/10 - no pain  -KG  --          Pain Scale: FACES Pre/Post-Treatment    Pain: FACES Scale, Pretreatment  -- 0-->no hurt  -TM       Pain: FACES Scale, Post-Treatment  -- 0-->no hurt  -TM          Oral Motor and Function    Dentition Assessment natural, present and adequate  -KG natural, present and adequate  -TM       Secretion Management WNL/WFL  -KG WNL/WFL  -TM       Mucosal Quality dry  -KG dry;other (see comments)   Open mouth posture at rest  -TM       Volitional Swallow WFL  -KG unable to elicit  -TM       Volitional Cough WFL  -KG unable to elicit  -TM          Oral Musculature and Cranial Nerve Assessment    Oral Motor General Assessment generalized oral motor weakness  -KG generalized oral motor weakness;other (see comments)   Pt unable to follow commands despite max cues, modeling  -TM       Oral Motor, Comment Able to follow all commands, no overt dysfunction noted.   -KG  --          General Eating/Swallowing Observations    Respiratory Support Currently in Use room air  -KG other (see comments)   Room air   -TM        Eating/Swallowing Skills fed by SLP  -KG  --       Positioning During Eating upright in bed  -KG  --       Utensils Used straw;spoon  -KG  --       Consistencies Trialed pudding thick;honey-thick liquids;nectar/syrup-thick liquids;thin liquids;regular textures  -KG  --          Clinical Swallow Eval    Oral Prep Phase impaired   Mild weakness  -KG impaired  -TM       Oral Transit  -- impaired  -TM       Oral Residue WFL  -KG  --       Pharyngeal Phase no overt signs/symptoms of pharyngeal impairment  -KG suspected pharyngeal impairment  -TM       Esophageal Phase  -- --   Unable to discern  -TM       Clinical Swallow Evaluation Summary Clinical swallow evaluation completed. Patient given a full range of consistencies. No overt s/s of aspiration. Patient did have somewhat weak mastication of cracker. Patient and family counseled on feeding softer foods to prevent fatigue. Pt ok for regular/soft diet with thin liquids. OK for meds in thin water. Patient/family given education on oral care. Patient only to eat when alert.    -KG Bedside swallow eval completed. Pt was lethargic at time of ST arrival. Open mouth posture in bed. Family stated pt became minimally responsive yesterday and has remained in such state. Reported occasional mumbling, yet no intelligible communication. Pt was presented labial ice chip stimulation x3, with hand movement to oppose with each attempt. Oral care then provided via water moistened toothette on multiple instances, with the pt tolerated without s/s of resistance. Lingual retraction despite max cues for protrusion. Only able to model on few instances when pt would open eyes, yet did not appear to visually attend to cues. Eventually, pt was able to complete a swallow x2 follow stim. 1x throat clear noted. Not appropriate for PO trials at this time. Educated wife and son on risks for aspiration and recommendation to remain NPO except for oral care via water moistened toothette. High risk for  aspiration with PO intake.   -TM          Oral Prep Concerns    Oral Prep Concerns  -- incomplete bolus preparation  -TM       Incomplete Bolus Preparation  -- thin;other (see comments)   Residue from water moistened toothette.  -TM       Oral Prep Concerns, Comment Generalized weakness with cracker  -KG  --          Oral Transit Concerns    Oral Transit Concerns  -- unable to initiate oral transit  -TM          Pharyngeal Phase Concerns    Pharyngeal Phase Concerns  -- throat clear  -TM       Throat Clear  -- thin;other (see comments)   Residue from water moistened toothette  -TM       Pharyngeal Phase Concerns, Comment No overt s/s of aspiration   -KG  --          Clinical Impression    SLP Swallowing Diagnosis mild;oral dysfunction;functional pharyngeal phase  -KG profound;oral dysfunction;suspected pharyngeal dysfunction  -TM       Functional Impact risk of aspiration/pneumonia;risk of malnutrition;risk of dehydration  -KG risk of aspiration/pneumonia;risk of malnutrition;risk of dehydration  -TM       Rehab Potential/Prognosis, Swallowing good, to achieve stated therapy goals  -KG re-evaluate goals as necessary  -TM       Criteria for Skilled Therapeutic Interventions Met no problems identified which require skilled intervention  -KG demonstrates skilled criteria  -TM          Recommendations    Therapy Frequency (Swallow) other (see comments)   Discharge, therapy no longer warranted.   -KG PRN  -TM       Predicted Duration Therapy Intervention (Days)  -- until discharge  -TM       SLP Diet Recommendation soft textures;thin liquids;nutritional supplements needed  -KG NPO;other (see comments)   Oral care  -TM       Recommended Diagnostics  -- reassess via clinical swallow evaluation;other (see comments)   If pt becomes more alert/appropriate.  -TM       Recommended Precautions and Strategies upright posture during/after eating;small bites of food and sips of liquid;other (see comments)   Strict oral care.   -KG  other (see comments)   Upright/semi-upright during oral care   -TM       SLP Rec. for Method of Medication Administration meds whole;with thin liquids;as tolerated  -KG meds via alternate route  -TM       Monitor for Signs of Aspiration yes;notify SLP if any concerns  -KG yes;cough;gurgly voice;throat clearing;pneumonia;right lower lobe infiltrates  -TM       Anticipated Dischage Disposition other (see comments)   Home with Hospice  -KG other (see comments)   Anticipate home with hospice  -TM       Demonstrates Need for Referral to Another Service clinical nutrition services/dietitian  -KG  --          Swallow Goals (SLP)    Oral Nutrition/Hydration Goal Selection (SLP)  -- oral nutrition/hydration, SLP goal 1  -TM          Oral Nutrition/Hydration Goal 1 (SLP)    Oral Nutrition/Hydration Goal 1, SLP  -- Pt will tolerate LRD for pt comfort without increased lung congestion.   -TM       Time Frame (Oral Nutrition/Hydration Goal 1, SLP)  -- by discharge  -TM       Barriers (Oral Nutrition/Hydration Goal 1, SLP)  -- Cognition, weakness, lethargy  -TM       Progress/Outcomes (Oral Nutrition/Hydration Goal 1, SLP) goal met  -KG goal ongoing  -TM         User Key  (r) = Recorded By, (t) = Taken By, (c) = Cosigned By    Initials Name Effective Dates    KG Sheila Gallegos CCC-SLP 06/08/18 -     TM Katy Owusu CCC-SLP 08/02/16 -         EDUCATION  The patient has been educated in the following areas:   Dysphagia (Swallowing Impairment) Oral Care/Hydration Modified Diet Instruction.    SLP Recommendation and Plan  SLP Swallowing Diagnosis: mild, oral dysfunction, functional pharyngeal phase  SLP Diet Recommendation: soft textures, thin liquids, nutritional supplements needed     Monitor for Signs of Aspiration: yes, notify SLP if any concerns     Criteria for Skilled Therapeutic Interventions Met: no problems identified which require skilled intervention  Anticipated Dischage Disposition: other (see comments) (Home with  Hospice)  Rehab Potential/Prognosis, Swallowing: good, to achieve stated therapy goals  Therapy Frequency (Swallow): other (see comments) (Discharge, therapy no longer warranted. )     Demonstrates Need for Referral to Another Service: clinical nutrition services/dietitian    Daily Summary of Progress (SLP): prepare for discharge  Plan for Continued Treatment (SLP): No further therapy warranted. Contact PCP if any further concerns with swallowing in the future.   Plan of Care Reviewed With: patient, spouse, son  Progress: improving  Outcome Summary: Clinical swallow evaluation completed. Patient given a full range of consistencies. No overt s/s of aspiration. Patient did have somewhat weak mastication of cracker. Patient and family counseled on feeding softer foods to prevent fatigue. Pt ok for regular/soft diet with thin liquids. OK for meds in thin water. Patient/family given education on oral care. Patient only to eat when alert.            SLP GOALS     Row Name 06/10/18 1045 06/09/18 0931          Oral Nutrition/Hydration Goal 1 (SLP)    Oral Nutrition/Hydration Goal 1, SLP  -- Pt will tolerate LRD for pt comfort without increased lung congestion.   -TM     Time Frame (Oral Nutrition/Hydration Goal 1, SLP)  -- by discharge  -TM     Barriers (Oral Nutrition/Hydration Goal 1, SLP)  -- Cognition, weakness, lethargy  -TM     Progress/Outcomes (Oral Nutrition/Hydration Goal 1, SLP) goal met  -KG goal ongoing  -TM       User Key  (r) = Recorded By, (t) = Taken By, (c) = Cosigned By    Initials Name Provider Type    KG Sheila Gallegos, CCC-SLP Speech and Language Pathologist     Katy Owusu, CCC-SLP Speech and Language Pathologist             SLP Outcome Measures (last 72 hours)      SLP Outcome Measures     Row Name 06/10/18 1100 06/09/18 1022          SLP Outcome Measures    Outcome Measure Used?  -- Adult NOMS  -TM        FCM Scores    FCM Chosen Swallowing  -KG Swallowing  -TM     Swallowing FCM Score 6  -KG  1  -TM       User Key  (r) = Recorded By, (t) = Taken By, (c) = Cosigned By    Initials Name Effective Dates    KG KIN Garibay 06/08/18 -     TM KIN De La Rosa 08/02/16 -            Time Calculation:         Time Calculation- SLP     Row Name 06/10/18 1103             Time Calculation- SLP    SLP Start Time 1030  -KG      SLP Stop Time 1110  -KG      SLP Time Calculation (min) 40 min  -KG      SLP Received On 06/10/18  -KG        User Key  (r) = Recorded By, (t) = Taken By, (c) = Cosigned By    Initials Name Provider Type    KG ADÁN GaribaySLP Speech and Language Pathologist          Therapy Charges for Today     Code Description Service Date Service Provider Modifiers Qty    61120526767 HC ST SWALLOWING PROJECTED 6/10/2018 KIN Garibay GN, CM 1    08020809514 HC ST SWALLOWING DISCHARGE 6/10/2018 KIN Garibay, CI 1    53420521877 HC ST EVAL ORAL PHARYNG SWALLOW 3 6/10/2018 KIN Garibay GN 1          SLP G-Codes  SLP NOMS Used?: Yes  Functional Limitations: Swallowing  Swallow Current Status (): 100 percent impaired, limited or restricted  Swallow Goal Status (): At least 80 percent but less than 100 percent impaired, limited or restricted  Swallow Discharge Status (): At least 1 percent but less than 20 percent impaired, limited or restricted    SLP Discharge Summary  Anticipated Dischage Disposition: other (see comments) (Home with Hospice)  Reason for Discharge: all goals and outcomes met, no further needs identified  Progress Toward Achieving Short/long Term Goals: all goals met within established timelines  Discharge Destination: other (see comments) (Home with hospice)    KIN Bernardo  6/10/2018

## 2018-06-10 NOTE — PROGRESS NOTES
"Neurosurgery Daily Progress Note    Assessment:   Jay Jones is a 77 y.o. yo with a significant PMH of metastatic prostate cancer and newly identified intercranial mass.    DDX:   Active Problems:    Brain mass    Prostate cancer metastatic to bone    Aphasia    Dysphagia    Dehydration    Thrombocytopenia    Prostate cancer    Patient Active Problem List   Diagnosis   • HLD (hyperlipidemia)   • CAD (coronary artery disease)   • Atrial flutter   • Prostate cancer metastatic to bone   • Antineoplastic chemotherapy induced anemia   • Generalized edema   • Hypocalcemia   • Brain mass   • Aphasia   • Dysphagia   • Dehydration   • Thrombocytopenia   • Prostate cancer       Plan:   Neuro: Jay has made a remarkable recovery which favors seizures.   Cont Keppra and wean decadron   FU in 6 weeks without imaging.    Fentanyl patch for pain control given puritis with dilaudid  Defer hospice care to hospitalist and oncology.      Chief complaint:   Spine pain    Subjective:  Symptoms:  Stable.    Pain:  He reports no pain.      None    Temp:  [97.7 °F (36.5 °C)-98.3 °F (36.8 °C)] 97.9 °F (36.6 °C)  Heart Rate:  [82-98] 82  Resp:  [18] 18  BP: ()/(31-43) 104/39    Objective:  Vital signs: (most recent): Blood pressure (!) 104/39, pulse 82, temperature 97.9 °F (36.6 °C), temperature source Axillary, resp. rate 18, height 155.4 cm (61.2\"), weight 61.6 kg (135 lb 14.4 oz), SpO2 94 %.        Neurologic Exam     Mental Status   Oriented to person, place, and time.   Attention: normal. Concentration: normal.   Speech: speech is normal   Level of consciousness: alert  Knowledge: good.     Cranial Nerves     CN III, IV, VI   Pupils are equal, round, and reactive to light.  Extraocular motions are normal.     CN V   Facial sensation intact.     CN VII   Facial expression full, symmetric.     Motor Exam   Right arm pronator drift: absent  Left arm pronator drift: absent    Strength   Right deltoid: 4/5  Left deltoid: 4/5  Right " biceps: 4/5  Left biceps: 4/5  Right triceps: 4/5  Left triceps: 4/5  Right iliopsoas: 4/5  Left iliopsoas: 4/5  Right quadriceps: 4/5  Left quadriceps: 4/5  Right gastroc: 4/5  Left gastroc: 4/5    Sensory Exam   Light touch normal.       Drains:      Imaging Results (last 24 hours)     ** No results found for the last 24 hours. **        Lab Results (last 24 hours)     Procedure Component Value Units Date/Time    POC Glucose Once [[id number]]  (Abnormal) Collected:  06/10/18 1117    Specimen:  Blood Updated:  06/10/18 1129     Glucose 150 (H) mg/dL      Comment: : [id number] cooala - your brandsyMeter ID: [id number]       POC Glucose Once [[id number]]  (Normal) Collected:  06/10/18 0859    Specimen:  Blood Updated:  06/10/18 0910     Glucose 129 mg/dL      Comment: : [id number] cooala - your brandsyMeter ID: [id number]       Urinalysis, Microscopic Only - Urine, Clean Catch [[id number]]  (Abnormal) Collected:  06/09/18 2153    Specimen:  Urine from Urine, Clean Catch Updated:  06/09/18 2237     RBC, UA 3-5 (A) /HPF      WBC, UA 13-20 (A) /HPF      Bacteria, UA Trace (A) /HPF      Squamous Epithelial Cells, UA 0-2 /HPF      Yeast, UA Small/1+ Yeast /HPF      Hyaline Casts, UA None Seen /LPF      Mucus, UA Trace /HPF      Methodology Manual Light Microscopy    Urinalysis With / Microscopic If Indicated (No Culture) - Urine, Clean Catch [[id number]]  (Abnormal) Collected:  06/09/18 2153    Specimen:  Urine from Urine, Clean Catch Updated:  06/09/18 2227     Color, UA Yellow     Appearance, UA Clear     pH, UA 6.0     Specific Gravity, UA 1.025     Glucose, UA Negative     Ketones, UA 15 mg/dL (1+) (A)     Bilirubin, UA Small (1+) (A)     Blood, UA Negative     Protein, UA 30 mg/dL (1+) (A)     Leuk Esterase, UA Trace (A)     Nitrite, UA Negative     Urobilinogen, UA 1.0 E.U./dL    POC Glucose Once [[id number]]  (Abnormal) Collected:  06/09/18 2100    Specimen:  Blood Updated:  06/09/18 2111     Glucose 135 (H) mg/dL       Comment: : [id number] Bronte FaithMeter ID: [id number]       POC Glucose Once [[id number]]  (Abnormal) Collected:  06/09/18 1646    Specimen:  Blood Updated:  06/09/18 1700     Glucose 167 (H) mg/dL      Comment: : [id number] Fadia Avilez DestinyMeter ID: [id number]             [id number]  Taj Anderson MD

## 2018-06-10 NOTE — PLAN OF CARE
Problem: Patient Care Overview  Goal: Plan of Care Review  Outcome: Ongoing (interventions implemented as appropriate)   06/10/18 0239   Coping/Psychosocial   Plan of Care Reviewed With patient;family   Plan of Care Review   Progress no change   OTHER   Outcome Summary Pt. c/o no pain. Pt resting really well. Family refuses turns sometimes. Right arm fentanyl patch in place. Home with hospice. VSS. Safety maintained.       Problem: Oncology Care (Adult)  Goal: Signs and Symptoms of Listed Potential Problems Will be Absent, Minimized or Managed (Oncology Care)  Outcome: Ongoing (interventions implemented as appropriate)      Problem: Fall Risk (Adult)  Goal: Identify Related Risk Factors and Signs and Symptoms  Outcome: Ongoing (interventions implemented as appropriate)    Goal: Absence of Fall  Outcome: Ongoing (interventions implemented as appropriate)      Problem: Skin Injury Risk (Adult)  Goal: Identify Related Risk Factors and Signs and Symptoms  Outcome: Ongoing (interventions implemented as appropriate)    Goal: Skin Health and Integrity  Outcome: Ongoing (interventions implemented as appropriate)

## 2018-06-10 NOTE — PLAN OF CARE
Problem: Patient Care Overview  Goal: Plan of Care Review  Outcome: Ongoing (interventions implemented as appropriate)   06/10/18 1226   Coping/Psychosocial   Plan of Care Reviewed With patient   Plan of Care Review   Progress improving   OTHER   Outcome Summary Patient is much more alert than he was yesterday. He is opening his eyes and talking. Disoriented to situation and time. No neuro changes. No c/o pain. Right arm fentanyl patch. Switched to [facility] soft diet today. Pills whole with water. Discharged home today with hospice.      Goal: Individualization and Mutuality  Outcome: Ongoing (interventions implemented as appropriate)    Goal: Discharge Needs Assessment  Outcome: Ongoing (interventions implemented as appropriate)    Goal: Interprofessional Rounds/Family Conf  Outcome: Ongoing (interventions implemented as appropriate)      Problem: Oncology Care (Adult)  Goal: Signs and Symptoms of Listed Potential Problems Will be Absent, Minimized or Managed (Oncology Care)  Outcome: Ongoing (interventions implemented as appropriate)      Problem: Fall Risk (Adult)  Goal: Identify Related Risk Factors and Signs and Symptoms  Outcome: Ongoing (interventions implemented as appropriate)    Goal: Absence of Fall  Outcome: Ongoing (interventions implemented as appropriate)      Problem: Skin Injury Risk (Adult)  Goal: Identify Related Risk Factors and Signs and Symptoms  Outcome: Ongoing (interventions implemented as appropriate)    Goal: Skin Health and Integrity  Outcome: Ongoing (interventions implemented as appropriate)

## 2018-06-10 NOTE — PLAN OF CARE
Problem: Patient Care Overview  Goal: Plan of Care Review  Outcome: Ongoing (interventions implemented as appropriate)   06/10/18 1101   Coping/Psychosocial   Plan of Care Reviewed With patient;spouse;son   Plan of Care Review   Progress improving   OTHER   Outcome Summary Clinical swallow evaluation completed. Patient given a full range of consistencies. No overt s/s of aspiration. Patient did have somewhat weak mastication of cracker. Patient and family counseled on feeding softer foods to prevent fatigue. Pt ok for regular/soft diet with thin liquids. OK for meds in thin water. Patient/family given education on oral care. Patient only to eat when alert.

## 2018-06-11 NOTE — PAYOR COMM NOTE
"FROM: PJ RUIZ  PHONE: [phone]  FAX: [phone]    ID: [id number]    Jay Lobato  (77 y.o. Male)     Date of Birth Social Security Number Address Home Phone MRN    1940  [address]   [place] [address] [phone] [id number]    Latter day Marital Status          Caodaism        Admission Date Admission Type Admitting Provider Attending Provider Department, Room/Bed    6/8/18 Urgent Taj Anderson MD  [facility] 3A, 325/1    Discharge Date Discharge Disposition Discharge Destination        6/10/2018 Hospice/Home              Attending Provider:  (none)   Allergies:  Oxycodone-acetaminophen, Codeine, Percocet [Oxycodone-acetaminophen], Tramadol    Isolation:  None   Infection:  None   Code Status:  Prior    Ht:  155.4 cm (61.2\")   Wt:  61.6 kg (135 lb 14.4 oz)    Admission Cmt:  None   Principal Problem:  None                Active Insurance as of 6/8/2018     Primary Coverage     Payor Plan Insurance Group Employer/Plan Group    MISC COMMERCIAL MISC COMMERCIAL INS [id number]     Coverage Address Coverage Phone Number Effective From Effective To    [address] [phone] 1/1/2006     [facility] [address]       Subscriber Name Subscriber Birth Date Member ID       JAY LOBATO 1940 [id number]                 Emergency Contacts      (Rel.) Home Phone Work Phone Mobile Phone    Audrey Lobato (Spouse) [phone] -- [phone]    VenusMuriel (Daughter) [phone] -- [phone]    Jarrett Hearn (Friend) [phone] -- --               History & Physical      Taj Anderson MD at 6/8/2018  9:03 PM          NEUROSURGERY INITIAL HOSPITAL ENCOUNTER    Assessment/Plan:   Jay Lobato is a 77 y.o. male with a significant comorbidity ablation on Eliquis and widely metastatic prostate cancer status post prostatectomy.  He presents with a new problem of confusion, aphasia. Physical exam findings of word salad, shortness of breath and " cachexia.  Their imaging shows nearly discovered left sphenoid wing mass with surrounding edema.    Differential Diagnosis:   Brain mass: Meningioma versus metastatic prostate disease  Shortness of air: Pneumonia versus pleural effusion versus much less likely pulmonary embolism  Cachexia  Severe dehydration  Thrombocytopenia  Widely metastatic prostate cancer  Bipedal edema  Dysphagia    Recommendations:  Most likely differential diagnosis and Jay's case would be sphenoid wing meningioma.  Meningiomas are known to show progression in patient's who are pregnant or on estrogen or progesterone.  Alternatively this could be metastatic prostate cancer to the brain, however this is extremely rare.  Regardless of the diagnosis of metastatic prostate cancer or meningioma, I would only offer high-dose steroids and Keppra.  Given his low KPS.  At this time the patient's family would not even desire an MRI as it would not change treatment strategy.    After discussion with Jay and his wife there decided not to proceed further treatment and would like to pursue hospice care.    I will contact the hospitalist service and Dr. BELTRÁN to assume his care.    Chest x-ray, CMP, CBC, midline catheter given difficult access, speech evaluation, SSI, Harvey for possible urinary retention, UA    I discussed the patients findings and my recommendations with patient and family    Level of Risk: High due to:  abrupt change in neurological status  MDM: High Complexity  Mod = 96489, High=99223  ___________________________________________________________________    Reason for consult  no onset aphasia      HPI: Patient is a 77 y.o. male with history of coronary artery disease s/p CABG, atrial flutter, metastatic prostate cancer with widely metastic bone metastasis.  He was recently admitted to [facility] on 5/20/18 with tachycardia, lower extremity swelling and chronic exertional dyspnea.  He subsequently underwent thoracentesis. He was placed on Eliquis  for stroke prophylaxis. He was loaded with digoxin and transitioned to oral therapy and converted to normal sinus rhythm.    He has no prior cranial imaging and no known metastasis to the brain.  He was in his normal state of health until approximately 2 days ago when he had a sudden decline in mobility.  The patient's wife states that he began coughing and had difficulty breathing.  Since this time he is merely transitioned from bed to his seated walker he would roll to his chair and transition from a walker to the chair.  His only other debility was to make to the bathroom.  He was not able to perform any activities of self-care.  Beginning this morning the patient had word salad.  He showed extreme confusion.  There is no evidence of hemiparesis.  This was not intermittent in nature but acute onset.  The patient denies, or does not show the evidence of headache.  He does continue to show signs and symptoms of hole IV pain including back pain which I attribute to his spinal metastasis.  He has not had a seizure like episodes including shaking, tremulousness, tongue biting, or urination.    ECOG  (4) Completely disabled, unable to carry out self-care and confined to bed or chair    KPS  30:  Severely disabled; hospitalization indication      Review of Systems   Constitutional: Positive for activity change, appetite change and unexpected weight change.   HENT: Negative.    Eyes: Negative.    Respiratory: Positive for cough, shortness of breath and wheezing.    Cardiovascular: Negative.    Gastrointestinal: Negative.    Endocrine: Positive for cold intolerance.   Genitourinary: Negative.    Musculoskeletal: Negative.    Skin: Positive for pallor.   Allergic/Immunologic: Negative.    Neurological: Positive for speech difficulty.   Hematological: Negative.    Psychiatric/Behavioral: Positive for confusion.        Past Medical History:  has a past medical history of Asthma; CAD in native artery; Cancer; Coronary artery  disease; HLD (hyperlipidemia); Infectious viral hepatitis; Prostate CA (2/6/2018); Prostate cancer metastatic to bone (2/6/2018); and Unspecified atrial flutter.    Past Surgical History:  has a past surgical history that includes Cardiac catheterization (Left, 11/01/2006); Appendectomy; Prostatectomy; Cholecystectomy; Coronary artery bypass graft (11/03/2006); and Other surgical history (02/28/2007).    Family History: family history includes Alzheimer's disease in his mother; Heart attack in his father; Heart disease in his father, maternal grandfather, and other; Heart failure in his father.    Social History:  reports that he quit smoking about 13 months ago. His smoking use included Cigars. He has never used smokeless tobacco. He reports that he does not drink alcohol or use drugs.    Allergies: Oxycodone-acetaminophen; Codeine; Percocet [oxycodone-acetaminophen]; and Tramadol    Home Medications: No current facility-administered medications for this encounter.     Medications: Scheduled Meds:  Continuous Infusions:  No current facility-administered medications for this encounter.   PRN Meds:.    Vital Signs       Physical Exam  Physical Exam   Constitutional: He appears well-developed and well-nourished.   HENT:   Head: Normocephalic and atraumatic.   Eyes: Conjunctivae and EOM are normal. Pupils are equal, round, and reactive to light.   Fundoscopic exam:       The right eye shows no exudate, no hemorrhage and no papilledema.        The left eye shows no exudate, no hemorrhage and no papilledema.   Neck: Normal range of motion. Neck supple.   Cardiovascular:   Pulses:       Dorsalis pedis pulses are 2+ on the right side, and 2+ on the left side.        Posterior tibial pulses are 2+ on the right side, and 2+ on the left side.   Pulmonary/Chest: He is in respiratory distress. He has wheezes.     Vascular Status -  His right foot exhibits abnormal foot edema. His left foot exhibits abnormal foot  edema.  Neurological: He has a normal Finger-Nose-Finger Test, a normal Heel to Rodas Test and a normal Tandem Gait Test.   Skin: Skin is warm. No rash noted. No erythema.       Neurologic Exam     Mental Status   Disoriented to person.   Disoriented to place.   Disoriented to year.   Attention: decreased. Concentration: decreased.   Speech: (Word salad.  )  Level of consciousness: drowsy    Cranial Nerves     CN II   Visual acuity: normal    CN III, IV, VI   Pupils are equal, round, and reactive to light.  Extraocular motions are normal.   Diplopia: none    CN V   Facial sensation intact.   Right corneal reflex: normal  Left corneal reflex: normal    CN VII   Right facial weakness: none  Left facial weakness: none    CN VIII   Hearing: intact    CN IX, X   Palate: symmetric  Right gag reflex: normal  Left gag reflex: normal    CN XI   Right trapezius strength: normal  Left trapezius strength: normal    CN XII   Tongue deviation: none    Motor Exam   Right arm tone: normal  Left arm tone: normal  Right arm pronator drift: absent  Left arm pronator drift: absent  Right leg tone: normal  Left leg tone: normal    Strength   Strength 5/5 except as noted.   Right deltoid: 4/5  Left deltoid: 4/5  Right biceps: 4/5  Left biceps: 4/5  Right triceps: 4/5  Left triceps: 4/5  Right interossei: 4/5  Left interossei: 4/5  Right iliopsoas: 4/5  Left iliopsoas: 4/5  Right quadriceps: 4/5  Left quadriceps: 4/5  Right anterior tibial: 4/5  Left anterior tibial: 4/5  Right posterior tibial: 4/5  Left posterior tibial: 4/5  Right gastroc: 4/5  Left gastroc: 4/5    Sensory Exam   Light touch normal.   Proprioception normal.     Gait, Coordination, and Reflexes     Gait  Gait: (nonambulatory)    Coordination   Finger to nose coordination: normal  Heel to shin coordination: normal  Tandem walking coordination: normal    Reflexes   Reflexes 2+ except as noted.   Right plantar: normal  Left plantar: normal  Right Kapoor: absent  Left  Antwon: absent      Results Review:   Independent review and interpretation of imaging  Imaging Results (last 24 hours)     ** No results found for the last 24 hours. **          CT Head:  Noncontrast CT of the brain performed at outside hospital on 6/8/2018.  There is evidence of a left frontal and temporal mass associated with the sphenoid wing.  There is surrounding edema in both the left temporal horn and left frontal lobe.  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  The evidence of surrounding cerebral edema suggest either metastasis or high-grade conversion of meningioma.                I reviewed the patient's new clinical results.  Lab Results (last 24 hours)     ** No results found for the last 24 hours. **        Outside hospital EKG normal sinus rhythm.    Review of outside hospital labs.    INR 1.2, CK-MB 0.8, CPK 74, LDH 2100, troponin less than 0.02, digoxin level 0.57, glucose 147, B1 26, creatinine 0.5, BUN/creatinine ratio 52, sodium 138, potassium 4.5, alkaline phosphatase 744    WBC 9.2, hemoglobin 8.3, hematocrit 27, platelets 80.    Taj Anderson MD          Electronically signed by Taj Anderson MD at 6/8/2018 10:15 PM       Emergency Department Notes     No notes of this type exist for this encounter.              ICU Vital Signs     Row Name 06/10/18 1025 06/10/18 0909 06/10/18 0820 06/09/18 2042 06/09/18 1934       Vitals    Temp  -- 97.9 °F (36.6 °C)  --  -- 97.7 °F (36.5 °C)    Temp src  -- Axillary  --  -- Axillary    Pulse  -- 82  --  -- 94    Heart Rate Source  -- Monitor  --  -- Monitor    Resp  -- 18  --  -- 18    Resp Rate Source  -- Visual  --  -- Visual    BP  -- (!)  104/39  --  -- 96/43    BP Location  -- Right arm  --  -- Right arm    BP Method  -- Automatic  --  -- Automatic    Patient Position  -- Lying  --  -- Lying       Oxygen Therapy    SpO2 94 % 95 %  -- 97 % 95 %    Pulse  "Oximetry Type Intermittent Intermittent  --  -- Intermittent    Device (Oxygen Therapy) room air room air room air room air room air    Row Name 06/09/18 1555 06/09/18 1343 06/09/18 0920 06/09/18 0918 06/09/18 0753       Vitals    Temp 98.3 °F (36.8 °C)  --  --  -- 98.9 °F (37.2 °C)    Temp src Oral  --  --  -- Oral    Pulse 95 98  -- 104 109    Heart Rate Source Monitor Monitor  -- Monitor Monitor    Resp 18 18  --  -- 10    Resp Rate Source Visual Visual  --  -- Visual    BP (!)  100/34   nurse notified. (!)  104/31  --  -- 99/42    BP Location Right arm Right arm  --  -- Right arm    BP Method Automatic Automatic  --  -- Automatic    Patient Position Lying Lying  --  -- Lying       Oxygen Therapy    SpO2 97 % 90 %  -- 92 % 92 %    Pulse Oximetry Type Intermittent Intermittent  -- Intermittent Intermittent    Device (Oxygen Therapy) room air room air room air room air room air    Row Name 06/09/18 0437 06/09/18 0000 06/08/18 2300 06/08/18 2129 06/08/18 2030       Height and Weight    Height  --  --  -- 155.4 cm (61.2\")  --    Height Method  --  --  -- Stated  --    Weight  --  --  --  -- 61.6 kg (135 lb 14.4 oz)    Weight Method  --  --  --  -- Bed scale    Ideal Body Weight (IBW) (kg)  --  --  -- 51.89  --    Weight in (lb) to have BMI = 25  --  --  -- 132.9  --       Vitals    Temp 97 °F (36.1 °C) 97.6 °F (36.4 °C)  --  -- 99.4 °F (37.4 °C)    Temp src Axillary Oral  --  -- Axillary    Pulse 113 103 100  -- 101    Heart Rate Source Monitor Monitor Monitor  -- Monitor    Resp 16 18 18  -- 18    Resp Rate Source Visual Visual Visual  -- Visual    /40 101/45  --  -- 105/47    BP Location Right arm Right arm  --  -- Right arm    BP Method Automatic Automatic  --  -- Automatic    Patient Position Lying Lying  --  -- Lying       Oxygen Therapy    SpO2 93 % 97 % 96 %  -- 95 %    Pulse Oximetry Type Intermittent Intermittent Intermittent  -- Intermittent    Device (Oxygen Therapy) room air room air room air  -- " "room air        Hospital Medications (all)       Dose Frequency Start End    dexamethasone (DECADRON) injection 10 mg 10 mg Once 6/8/2018 6/8/2018    Sig - Route: Infuse 2.5 mL into a venous catheter 1 (One) Time. - Intravenous    Linked Group 1:  \"Followed by\" Linked Group Details        apixaban (ELIQUIS) tablet 5 mg (Discontinued) 5 mg Every 12 Hours Scheduled 6/8/2018 6/10/2018    Sig - Route: Take 1 tablet by mouth Every 12 (Twelve) Hours. - Oral    Reason for Discontinue: Patient Discharge    calcium carb-cholecalciferol 600-800 MG-UNIT tablet 1 tablet (Discontinued) 1 tablet Daily 6/9/2018 6/10/2018    Sig - Route: Take 1 tablet by mouth Daily. - Oral    Reason for Discontinue: Patient Discharge    dexamethasone (DECADRON) injection 2 mg (Discontinued) 2 mg Every 6 Hours 6/12/2018 6/10/2018    Sig - Route: Infuse 0.5 mL into a venous catheter Every 6 (Six) Hours. - Intravenous    Reason for Discontinue: Patient Discharge    Linked Group 1:  \"Followed by\" Linked Group Details        dexamethasone (DECADRON) injection 2 mg (Discontinued) 2 mg Every 12 Hours 6/15/2018 6/10/2018    Sig - Route: Infuse 0.5 mL into a venous catheter Every 12 (Twelve) Hours. - Intravenous    Reason for Discontinue: Patient Discharge    Linked Group 1:  \"Followed by\" Linked Group Details        dexamethasone (DECADRON) injection 4 mg (Discontinued) 4 mg Every 6 Hours 6/9/2018 6/10/2018    Sig - Route: Infuse 1 mL into a venous catheter Every 6 (Six) Hours. - Intravenous    Reason for Discontinue: Patient Discharge    Linked Group 1:  \"Followed by\" Linked Group Details        dextrose (D50W) solution 25 g (Discontinued) 25 g Every 15 Minutes PRN 6/8/2018 6/10/2018    Sig - Route: Infuse 50 mL into a venous catheter Every 15 (Fifteen) Minutes As Needed for Low Blood Sugar (Blood Sugar Less Than 70). - Intravenous    Reason for Discontinue: Patient Discharge    dextrose (GLUTOSE) oral gel 15 g (Discontinued) 15 g Every 15 Minutes PRN " 6/8/2018 6/10/2018    Sig - Route: Take 15 g by mouth Every 15 (Fifteen) Minutes As Needed for Low Blood Sugar (Blood sugar less than 70). - Oral    Reason for Discontinue: Patient Discharge    digoxin (LANOXIN) tablet 125 mcg (Discontinued) 125 mcg Daily With Lunch 6/9/2018 6/10/2018    Sig - Route: Take 1 tablet by mouth Daily With Lunch. - Oral    Reason for Discontinue: Patient Discharge    estrogens (conjugated) (PREMARIN) tablet 0.625 mg (Discontinued) 0.625 mg Daily 6/9/2018 6/9/2018    Sig - Route: Take 1 tablet by mouth Daily. - Oral    famotidine (PEPCID) tablet 40 mg (Discontinued) 40 mg Daily 6/9/2018 6/10/2018    Sig - Route: Take 2 tablets by mouth Daily. - Oral    Reason for Discontinue: Patient Discharge    fentaNYL (DURAGESIC) 12 MCG/HR 1 patch (Discontinued) 1 patch Every 72 Hours 6/9/2018 6/10/2018    Sig - Route: Place 1 patch on the skin Every 72 (Seventy-Two) Hours. - Transdermal    Reason for Discontinue: Patient Discharge    furosemide (LASIX) tablet 20 mg (Discontinued) 20 mg Daily 6/9/2018 6/10/2018    Sig - Route: Take 1 tablet by mouth Daily. - Oral    Reason for Discontinue: Patient Discharge    glucagon (human recombinant) (GLUCAGEN DIAGNOSTIC) injection 1 mg (Discontinued) 1 mg As Needed 6/8/2018 6/10/2018    Sig - Route: Inject 1 mg under the skin As Needed (Blood Glucose Less Than 70). - Subcutaneous    Reason for Discontinue: Patient Discharge    HYDROmorphone (DILAUDID) injection 0.5 mg (Discontinued) 0.5 mg Every 3 Hours PRN 6/9/2018 6/10/2018    Sig - Route: Infuse 0.5 mL into a venous catheter Every 3 (Three) Hours As Needed for Severe Pain . - Intravenous    Reason for Discontinue: Patient Discharge    HYDROmorphone (DILAUDID) tablet 2 mg (Discontinued) 2 mg Every 3 Hours PRN 6/8/2018 6/9/2018    Sig - Route: Take 1 tablet by mouth Every 3 (Three) Hours As Needed for Severe Pain . - Oral    insulin lispro (humaLOG) injection 2-7 Units (Discontinued) 2-7 Units 4 Times Daily With  "Meals & Nightly 6/8/2018 6/10/2018    Sig - Route: Inject 2-7 Units under the skin 4 (Four) Times a Day With Meals & at Bedtime. - Subcutaneous    Reason for Discontinue: Patient Discharge    levETIRAcetam in NaCl 0.82% (KEPPRA) IVPB 500 mg (Discontinued) 500 mg Every 12 Hours Scheduled 6/8/2018 6/10/2018    Sig - Route: Infuse 100 mL into a venous catheter Every 12 (Twelve) Hours. - Intravenous    Reason for Discontinue: Patient Discharge    LORazepam (ATIVAN) injection 0.25 mg (Discontinued) 0.25 mg Every 6 Hours PRN 6/8/2018 6/10/2018    Sig - Route: Infuse 0.125 mL into a venous catheter Every 6 (Six) Hours As Needed for Anxiety or Agitation. - Intravenous    Reason for Discontinue: Patient Discharge    metFORMIN ER (GLUCOPHAGE-XR) 24 hr tablet 500 mg (Discontinued) 500 mg Daily With Breakfast 6/9/2018 6/10/2018    Sig - Route: Take 1 tablet by mouth Daily With Breakfast. - Oral    Reason for Discontinue: Patient Discharge    metoprolol tartrate (LOPRESSOR) tablet 50 mg (Discontinued) 50 mg Every 12 Hours Scheduled 6/8/2018 6/10/2018    Sig - Route: Take 1 tablet by mouth Every 12 (Twelve) Hours. - Oral    Reason for Discontinue: Patient Discharge    ondansetron (ZOFRAN) injection 4 mg (Discontinued) 4 mg Every 6 Hours PRN 6/8/2018 6/10/2018    Sig - Route: Infuse 2 mL into a venous catheter Every 6 (Six) Hours As Needed for Nausea or Vomiting. - Intravenous    Reason for Discontinue: Patient Discharge    Linked Group 2:  \"Or\" Linked Group Details        ondansetron (ZOFRAN) tablet 4 mg (Discontinued) 4 mg Every 6 Hours PRN 6/8/2018 6/10/2018    Sig - Route: Take 1 tablet by mouth Every 6 (Six) Hours As Needed for Nausea or Vomiting. - Oral    Reason for Discontinue: Patient Discharge    Linked Group 2:  \"Or\" Linked Group Details        ondansetron ODT (ZOFRAN-ODT) disintegrating tablet 4 mg (Discontinued) 4 mg Every 6 Hours PRN 6/8/2018 6/10/2018    Sig - Route: Take 1 tablet by mouth Every 6 (Six) Hours As " "Needed for Nausea or Vomiting. - Oral    Reason for Discontinue: Patient Discharge    Linked Group 2:  \"Or\" Linked Group Details        potassium chloride (MICRO-K) CR capsule 10 mEq (Discontinued) 10 mEq Daily With Lunch 6/9/2018 6/10/2018    Sig - Route: Take 1 capsule by mouth Daily With Lunch. - Oral    Reason for Discontinue: Patient Discharge    predniSONE (DELTASONE) tablet 5 mg (Discontinued) 5 mg 2 Times Daily With Meals 6/8/2018 6/8/2018    Sig - Route: Take 1 tablet by mouth 2 (Two) Times a Day With Meals. - Oral    Reason for Discontinue: Alternate therapy    sennosides-docusate sodium (SENOKOT-S) 8.6-50 MG tablet 2 tablet (Discontinued) 2 tablet 2 Times Daily 6/8/2018 6/10/2018    Sig - Route: Take 2 tablets by mouth 2 (Two) Times a Day. - Oral    Reason for Discontinue: Patient Discharge    sodium chloride 0.9 % flush 1-10 mL (Discontinued) 1-10 mL As Needed 6/8/2018 6/10/2018    Sig - Route: Infuse 1-10 mL into a venous catheter As Needed for Line Care. - Intravenous    Reason for Discontinue: Patient Discharge    sodium chloride 0.9 % with KCl 20 mEq/L infusion (Discontinued) 100 mL/hr Continuous 6/8/2018 6/10/2018    Sig - Route: Infuse 100 mL/hr into a venous catheter Continuous. - Intravenous    Reason for Discontinue: Patient Discharge    valsartan (DIOVAN) tablet 20 mg (Discontinued) 20 mg Daily With Lunch 6/9/2018 6/10/2018    Sig - Route: Take 0.5 tablets by mouth Daily With Lunch. - Oral    Reason for Discontinue: Patient Discharge          Lab Results (last 72 hours)     Procedure Component Value Units Date/Time    POC Glucose Once [[id number]]  (Abnormal) Collected:  06/10/18 1117    Specimen:  Blood Updated:  06/10/18 1129     Glucose 150 (H) mg/dL      Comment: : [id number] Avilez [facility] ID: [id number]       POC Glucose Once [[id number]]  (Normal) Collected:  06/10/18 0859    Specimen:  Blood Updated:  06/10/18 0910     Glucose 129 mg/dL      Comment: : [id number] Loaiza" Raghav [employer] ID: [id number]       Urinalysis, Microscopic Only - Urine, Clean Catch [[id number]]  (Abnormal) Collected:  06/09/18 2153    Specimen:  Urine from Urine, Clean Catch Updated:  06/09/18 2237     RBC, UA 3-5 (A) /HPF      WBC, UA 13-20 (A) /HPF      Bacteria, UA Trace (A) /HPF      Squamous Epithelial Cells, UA 0-2 /HPF      Yeast, UA Small/1+ Yeast /HPF      Hyaline Casts, UA None Seen /LPF      Mucus, UA Trace /HPF      Methodology Manual Light Microscopy    Urinalysis With / Microscopic If Indicated (No Culture) - Urine, Clean Catch [[id number]]  (Abnormal) Collected:  06/09/18 2153    Specimen:  Urine from Urine, Clean Catch Updated:  06/09/18 2227     Color, UA Yellow     Appearance, UA Clear     pH, UA 6.0     Specific Gravity, UA 1.025     Glucose, UA Negative     Ketones, UA 15 mg/dL (1+) (A)     Bilirubin, UA Small (1+) (A)     Blood, UA Negative     Protein, UA 30 mg/dL (1+) (A)     Leuk Esterase, UA Trace (A)     Nitrite, UA Negative     Urobilinogen, UA 1.0 E.U./dL    POC Glucose Once [[id number]]  (Abnormal) Collected:  06/09/18 2100    Specimen:  Blood Updated:  06/09/18 2111     Glucose 135 (H) mg/dL      Comment: : [id number] [place] [employer] ID: [id number]       POC Glucose Once [[id number]]  (Abnormal) Collected:  06/09/18 1646    Specimen:  Blood Updated:  06/09/18 1700     Glucose 167 (H) mg/dL      Comment: : [id number] Fadia Avilez [employer] ID: [id number]       POC Glucose Once [[id number]]  (Abnormal) Collected:  06/09/18 1143    Specimen:  Blood Updated:  06/09/18 1214     Glucose 152 (H) mg/dL      Comment: : [id number] Fadia Avilez [employer] ID: [id number]       POC Glucose Once [[id number]]  (Abnormal) Collected:  06/09/18 0858    Specimen:  Blood Updated:  06/09/18 0909     Glucose 158 (H) mg/dL      Comment: : [id number] Fadia Avilez [employer] ID: [id number]       POC Glucose Once [[id number]]  (Abnormal) Collected:  06/08/18 5996    Specimen:  Blood  Updated:  06/08/18 2358     Glucose 147 (H) mg/dL      Comment: : [id number] Meghan Gore ID: [id number]       Lactate Dehydrogenase [[id number]]  (Abnormal) Collected:  06/08/18 2241    Specimen:  Blood Updated:  06/08/18 2347     LDH 5,614 (H) U/L     CBC Auto Differential [[id number]]  (Abnormal) Collected:  06/08/18 2241    Specimen:  Blood Updated:  06/08/18 2324     WBC 7.05 10*3/mm3      RBC 2.71 (L) 10*6/mm3      Hemoglobin 8.1 (L) g/dL      Hematocrit 26.5 (L) %      MCV 97.8 (H) fL      MCH 29.9 pg      MCHC 30.6 (L) g/dL      RDW 21.6 (H) %      RDW-SD 76.1 (H) fl      MPV 9.7 fL      Platelets 83 (L) 10*3/mm3     Manual Differential [[id number]]  (Abnormal) Collected:  06/08/18 2241    Specimen:  Blood Updated:  06/08/18 2324     Neutrophil % 69.5 %      Lymphocyte % 15.8 %      Monocyte % 6.3 %      Bands %  8.4 %      Neutrophils Absolute 5.49 10*3/mm3      Lymphocytes Absolute 1.11 10*3/mm3      Monocytes Absolute 0.44 10*3/mm3      nRBC 6.3 (H) /100 WBC      Anisocytosis Slight/1+     Dacrocytes Slight/1+     Microcytes Slight/1+     Ovalocytes Slight/1+     Poikilocytes Slight/1+     Polychromasia Slight/1+     WBC Morphology Normal     Platelet Estimate Decreased    Comprehensive Metabolic Panel [[id number]]  (Abnormal) Collected:  06/08/18 2241    Specimen:  Blood Updated:  06/08/18 2319     Glucose 129 (H) mg/dL      BUN 27 (H) mg/dL      Creatinine 0.45 (L) mg/dL      Sodium 137 mmol/L      Potassium 4.7 mmol/L      Chloride 102 mmol/L      CO2 27.0 mmol/L      Calcium 6.6 (L) mg/dL      Total Protein 4.7 (L) g/dL      Albumin 2.50 (L) g/dL      ALT (SGPT) 25 U/L      AST (SGOT) 126 (H) U/L      Alkaline Phosphatase 639 (H) U/L      Total Bilirubin 0.5 mg/dL      eGFR Non African Amer >150 mL/min/1.73      Globulin 2.2 gm/dL      A/G Ratio 1.1 g/dL      BUN/Creatinine Ratio 60.0 (H)     Anion Gap 8.0 mmol/L     Narrative:       The MDRD GFR formula is only valid for adults with stable  renal function between ages 18 and 70.    Blood Gas, Arterial With Co-Ox [[id number]]  (Abnormal) Collected:  06/08/18 2310    Specimen:  Arterial Blood Updated:  06/08/18 2313     Site Right Brachial     Lino's Test Positive     pH, Arterial 7.479 (H) pH units      pCO2, Arterial 31.0 (L) mm Hg      pO2, Arterial 66.7 (L) mm Hg      HCO3, Arterial 23.0 mmol/L      Base Excess, Arterial -0.3 (L) mmol/L      O2 Saturation, Arterial 94.9 %      Hemoglobin, Blood Gas 7.7 (L) g/dL      Hematocrit, Blood Gas 23.5 (L) %      Oxyhemoglobin 92.2 (L) %      Methemoglobin 0.90 %      Carboxyhemoglobin 2.0 %      Temperature 37.0 C      Sodium, Arterial 136 mmol/L      Potassium, Arterial 4.0 mmol/L      Ionized Calcium 3.77 (L) mg/dL      Barometric Pressure for Blood Gas 752 mmHg      Modality N/A     FIO2 21 %      Ventilator Mode NA     Collected by [id number]     pH, Temp Corrected -- pH Units      pCO2, Temperature Corrected -- mm Hg      pO2, Temperature Corrected -- mm Hg     Digoxin Level [[id number]]  (Abnormal) Collected:  06/08/18 2241    Specimen:  Blood Updated:  06/08/18 2311     Digoxin 0.70 (L) ng/mL     Lactic Acid, Plasma [[id number]]  (Normal) Collected:  06/08/18 2241    Specimen:  Blood Updated:  06/08/18 2311     Lactate 1.3 mmol/L           Imaging Results (last 72 hours)     ** No results found for the last 72 hours. **        ECG/EMG Results (last 72 hours)     ** No results found for the last 72 hours. **        Orders (last 72 hrs)     Start     Ordered    06/15/18 0500  dexamethasone (DECADRON) injection 2 mg  Every 12 Hours,   Status:  Discontinued      06/08/18 2214 06/12/18 0500  dexamethasone (DECADRON) injection 2 mg  Every 6 Hours,   Status:  Discontinued      06/08/18 2214 06/12/18 0000  fentaNYL (DURAGESIC) 12 MCG/HR  Every 72 Hours      06/10/18 1137    06/10/18 1136  Discontinue IV  Once,   Status:  Canceled      06/10/18 1137    06/10/18 1135  Discharge patient  Once      06/10/18 1137     06/10/18 1130  POC Glucose Once  Once      06/10/18 1129    06/10/18 1103  SLP Plan of Care Cert / Re-Cert  Once     Comments:  Speech Language Pathology Plan of Care  Initial Certification  Certification Period: 6/10/2018 - 2018    Patient Name: Jay Jones  : 1940    (R13.12) Oropharyngeal dysphagia  (primary encounter diagnosis)                Assessment  SLP Assessment  Prior Level of Function-Communication: WFL  Prior Level of Function-Swallowing: concerns regarding malnutrition, concerns regarding dehydration  SLP Swallowing Diagnosis: mild, oral dysfunction, functional pharyngeal phase  Plan of Care Reviewed With: patient, spouse, son  Criteria for Skilled Therapeutic Interventions Met: no problems identified which require skilled intervention            IP SLP Goals     Row Name 06/10/18 1045 18 0931          Oral Nutrition/Hydration Goal 1 (SLP)    Oral Nutrition/Hydration Goal 1, SLP  - Pt will tolerate LRD for pt   comfort without increased lung congestion.   -TM     Time Frame (Oral Nutrition/Hydration Goal 1, SLP)  - by discharge  -TM     Barriers (Oral Nutrition/Hydration Goal 1, SLP)  - Cognition, weakness,   lethargy  -TM     Progress/Outcomes (Oral Nutrition/Hydration Goal 1, SLP) goal met  -KG   goal ongoing  -TM       User Key  (r) = Recorded By, (t) = Taken By, (c) = Cosigned By    Initials Name Provider Type    KG Sheila Gallegos CCC-SLP Speech and Language Pathologist    TM Katy Owusu, CCC-SLP Speech and Language Pathologist                  Plan    SLP Plan  Therapy Frequency (Swallow): other (see comments) (Discharge, therapy no longer warranted. )  Predicted Duration Therapy Intervention (Days): until discharge  Daily Summary of Progress (SLP): prepare for discharge  Plan for Continued Treatment (SLP): No further therapy warranted. Contact PCP if any further concerns with swallowing in the future.         Sheila Gallegos CCC-SLP  6/10/2018      By cosigning this  order, either electronically or physically, I certify that the therapy services are furnished while this patient is under my care, the services outline above are required by this patient, and will be reviewed every 90 days.        M.D.:__________________________________________ Date: ______________    06/10/18 1103    06/10/18 1045  Oral Care  Every Shift,   Status:  Canceled      06/10/18 1044    06/10/18 1044  Diet Soft Texture; Whole Foods; Thin; Cardiac  Diet Effective Now,   Status:  Canceled     Comments:  Continue any MD dietary restrictions.    06/10/18 1044    06/10/18 0911  POC Glucose Once  Once      06/10/18 0910    06/10/18 0000  PredniSONE (DELTASONE) 10 MG (48) tablet pack      06/10/18 1137    06/10/18 0000  Referral to Home Health      06/10/18 1137    06/10/18 0000  Activity as Tolerated      06/10/18 1137    06/10/18 0000  Advance Diet As Tolerated      06/10/18 1137    06/09/18 2207  Urinalysis, Microscopic Only - Urine, Clean Catch  Once      06/09/18 2206    06/09/18 2112  POC Glucose Once  Once      06/09/18 2111    06/09/18 1701  POC Glucose Once  Once      06/09/18 1700    06/09/18 1215  POC Glucose Once  Once      06/09/18 1214    06/09/18 1200  digoxin (LANOXIN) tablet 125 mcg  Daily With Lunch,   Status:  Discontinued      06/08/18 2214 06/09/18 1200  potassium chloride (MICRO-K) CR capsule 10 mEq  Daily With Lunch,   Status:  Discontinued      06/08/18 2214 06/09/18 1200  valsartan (DIOVAN) tablet 20 mg  Daily With Lunch,   Status:  Discontinued      06/08/18 2214 06/09/18 1200  fentaNYL (DURAGESIC) 12 MCG/HR 1 patch  Every 72 Hours,   Status:  Discontinued      06/09/18 1039    06/09/18 1143  Inpatient Case Management  Consult  Once     Provider:  (Not yet assigned)    06/09/18 1143    06/09/18 1142  Conditional Code  Continuous,   Status:  Canceled      06/09/18 1142    06/09/18 1047  Inpatient Case Management  Consult  Once     Provider:  (Not  yet assigned)    18 1047    18 1029  SLP Plan of Care Cert / Re-Cert  Once     Comments:  Speech Language Pathology Plan of Care  Initial Certification  Certification Period: 2018 - 2018    Patient Name: Jay Jones  : 1940    (R13.12) Oropharyngeal dysphagia  (primary encounter diagnosis)                Assessment  SLP Assessment  Prior Level of Function-Communication: cognitive-linguistic impairment  Prior Level of Function-Swallowing: concerns regarding malnutrition, concerns regarding dehydration  SLP Swallowing Diagnosis: profound, oral dysfunction, suspected pharyngeal dysfunction  Plan of Care Reviewed With: spouse, son, other (see comments) (RNFaviola)  Criteria for Skilled Therapeutic Interventions Met: demonstrates skilled criteria            IP SLP Goals     Row Name 18 0931             Oral Nutrition/Hydration Goal 1 (SLP)    Oral Nutrition/Hydration Goal 1, SLP Pt will tolerate LRD for pt comfort   without increased lung congestion.   -TM      Time Frame (Oral Nutrition/Hydration Goal 1, SLP) by discharge  -TM      Barriers (Oral Nutrition/Hydration Goal 1, SLP) Cognition, weakness,   lethargy  -TM      Progress/Outcomes (Oral Nutrition/Hydration Goal 1, SLP) goal ongoing    -TM        User Key  (r) = Recorded By, (t) = Taken By, (c) = Cosigned By    Initials Name Provider Type    TM Katy Owusu CCC-SLP Speech and Language Pathologist                SLP OP Goals     Row Name 18 1022          SLP Time Calculation    SLP Goal Re-Cert Due Date 18  -TM       User Key  (r) = Recorded By, (t) = Taken By, (c) = Cosigned By    Initials Name Provider Type    TM Katy Owusu CCC-SLP Speech and Language Pathologist            Plan    SLP Plan  Therapy Frequency (Swallow): PRN  Predicted Duration Therapy Intervention (Days): until discharge        Katy Owusu CCC-SLP  2018      By cosigning this order, either electronically or physically, I certify that  the therapy services are furnished while this patient is under my care, the services outline above are required by this patient, and will be reviewed every 90 days.        M.D.:__________________________________________ Date: ______________    06/09/18 1029    06/09/18 0910  POC Glucose Once  Once      06/09/18 0909    06/09/18 0900  calcium carb-cholecalciferol 600-800 MG-UNIT tablet 1 tablet  Daily,   Status:  Discontinued      06/08/18 2214 06/09/18 0900  estrogens (conjugated) (PREMARIN) tablet 0.625 mg  Daily,   Status:  Discontinued      06/08/18 2214 06/09/18 0900  furosemide (LASIX) tablet 20 mg  Daily,   Status:  Discontinued      06/08/18 2214 06/09/18 0900  famotidine (PEPCID) tablet 40 mg  Daily,   Status:  Discontinued      06/08/18 2214 06/09/18 0833  Inpatient Hospice / [facility] Consult  Once,   Status:  Canceled     Specialty:  Hospice and Palliative Medicine  Provider:  (Not yet assigned)    06/09/18 0832    06/09/18 0800  metFORMIN ER (GLUCOPHAGE-XR) 24 hr tablet 500 mg  Daily With Breakfast,   Status:  Discontinued      06/08/18 2214 06/09/18 0700  POC Glucose 4x Daily AC & at Bedtime  4 Times Daily Before Meals & at Bedtime,   Status:  Canceled      06/08/18 2214 06/09/18 0500  dexamethasone (DECADRON) injection 4 mg  Every 6 Hours,   Status:  Discontinued      06/08/18 2214 06/09/18 0114  HYDROmorphone (DILAUDID) injection 0.5 mg  Every 3 Hours PRN,   Status:  Discontinued      06/09/18 0116    06/09/18 0000  Vital Signs  Every 4 Hours,   Status:  Canceled      06/08/18 2214 06/08/18 2359  POC Glucose Once  Once      06/08/18 2358    06/08/18 2353  Inpatient Spiritual Care Consult  Once     Provider:  (Not yet assigned)    06/08/18 2353 06/08/18 2325  LORazepam (ATIVAN) injection 0.25 mg  Every 6 Hours PRN,   Status:  Discontinued      06/08/18 2325    06/08/18 2314  Blood Gas, Arterial With Co-Ox  Once      06/08/18 2313    06/08/18 2309  Inpatient Case Management   Consult  Once     Comments:  Notify in am; family request at least an hour notice of arrival to assure all pertinent family members available   Provider:  (Not yet assigned)    06/08/18 2310    06/08/18 2300  Strict Intake and Output  Every Hour,   Status:  Canceled      06/08/18 2214 06/08/18 2300  Comprehensive Metabolic Panel  STAT      06/08/18 2214 06/08/18 2300  Lactate Dehydrogenase  Once      06/08/18 2214 06/08/18 2300  dexamethasone (DECADRON) injection 10 mg  Once      06/08/18 2214 06/08/18 2258  Manual Differential  Once      06/08/18 2257    06/08/18 2245  apixaban (ELIQUIS) tablet 5 mg  Every 12 Hours Scheduled,   Status:  Discontinued      06/08/18 2214 06/08/18 2245  metoprolol tartrate (LOPRESSOR) tablet 50 mg  Every 12 Hours Scheduled,   Status:  Discontinued      06/08/18 2214 06/08/18 2245  predniSONE (DELTASONE) tablet 5 mg  2 Times Daily With Meals,   Status:  Discontinued      06/08/18 2214 06/08/18 2245  sennosides-docusate sodium (SENOKOT-S) 8.6-50 MG tablet 2 tablet  2 Times Daily,   Status:  Discontinued      06/08/18 2214 06/08/18 2245  sodium chloride 0.9 % with KCl 20 mEq/L infusion  Continuous,   Status:  Discontinued      06/08/18 2214 06/08/18 2245  levETIRAcetam in NaCl 0.82% (KEPPRA) IVPB 500 mg  Every 12 Hours Scheduled,   Status:  Discontinued      06/08/18 2214 06/08/18 2245  insulin lispro (humaLOG) injection 2-7 Units  4 Times Daily With Meals & Nightly,   Status:  Discontinued      06/08/18 2214 06/08/18 2215  Remove & Replace Compression Stockings (TEDS) Daily  Daily,   Status:  Canceled      06/08/18 2214 06/08/18 2214  SLP Consult: Eval & Treat  Once,   Status:  Canceled      06/08/18 2214 06/08/18 2213  Do NOT Hold Basal Insulin When Patient is NPO, Hold Bolus Dose if NPO  Continuous,   Status:  Canceled      06/08/18 2214 06/08/18 2213  Follow [facility] Hypoglycemia Protocol For Blood Glucose Less Than 70 mg/dL  Until  Discontinued,   Status:  Canceled      06/08/18 2214 06/08/18 2213  Hypoglycemia Treatment - Alert Patient That is Not NPO and Can Safely Swallow  Until Discontinued,   Status:  Canceled     Comments:  Administer 4 oz Fruit Juice OR 4 oz Regular Soda OR 8 oz Milk OR 15-30 grams (1 tube) of Glucose Gel.  Recheck Blood Glucose 15 Minutes After Ingestion, Repeat Treatment & Continue to Recheck Blood Sugar Every 15 Minutes Until Blood Glucose is 70 mg/dL or Higher.  Once Blood Glucose is 70 mg/dL or Higher and if It Will Be more than 60 Minutes Until the Next Meal, Provide Appropriate Snack (Including Carbohydrate Food) Based on Meal Plan Order. Give Meal Tray As Soon As Possible.    06/08/18 2214 06/08/18 2213  Hypoglycemia Treatment - Patient Has IV Access - Unresponsive, NPO or Unable To Safely Swallow  Until Discontinued,   Status:  Canceled     Comments:  Administer 25g (50ml) D50W IV Push.  Recheck Blood Glucose 15 Minutes After Administration, if Blood Glucose Remains Less Than 70 mg/dl, Repeat Treatment   Recheck Blood Glucose 15 Minutes After 2nd Administration, if Blood Glucose Remains Less Than 70 mg/dL After 2nd Dose of D50W, Contact Provider for Further Treatment Orders & Consider Adding IVF With D5W for Maintenance    06/08/18 2214 06/08/18 2213  Hypoglycemia Treatment - Patient Without IV Access - Unresponsive, NPO or Unable To Safely Swallow  Until Discontinued,   Status:  Canceled     Comments:  Administer 1mg Glucagon SQ & Establish IV Access.  Turn Patient on Side - Nausea / Vomiting May Occur.  Recheck Blood Glucose 15 Minutes After Administration.  If Blood Glucose Remains Less Than 70, Administer 25g D50W IV Push (50ml).  Recheck Blood Glucose 15 Minutes After Administration of D50W, if Blood Glucose Remains Less Than 70 mg/dl, Contact Provider for Further Treatment Orders & Consider Adding IVF With D5 for Maintenance    06/08/18 2214 06/08/18 2213  Notify Provider of event. Communicate  needs and document in EMR.  Once,   Status:  Canceled      06/08/18 2214 06/08/18 2213  Document event and patients response to treatment in EMR, any medications given to be documented on MAR.  Once,   Status:  Canceled      06/08/18 2214 06/08/18 2212  dextrose (GLUTOSE) oral gel 15 g  Every 15 Minutes PRN,   Status:  Discontinued      06/08/18 2214 06/08/18 2212  dextrose (D50W) solution 25 g  Every 15 Minutes PRN,   Status:  Discontinued      06/08/18 2214 06/08/18 2212  glucagon (human recombinant) (GLUCAGEN DIAGNOSTIC) injection 1 mg  As Needed,   Status:  Discontinued      06/08/18 2214 06/08/18 2209  HYDROmorphone (DILAUDID) tablet 2 mg  Every 3 Hours PRN,   Status:  Discontinued      06/08/18 2214 06/08/18 2209  ondansetron (ZOFRAN) tablet 4 mg  Every 6 Hours PRN,   Status:  Discontinued      06/08/18 2214 06/08/18 2209  ondansetron ODT (ZOFRAN-ODT) disintegrating tablet 4 mg  Every 6 Hours PRN,   Status:  Discontinued      06/08/18 2214 06/08/18 2209  ondansetron (ZOFRAN) injection 4 mg  Every 6 Hours PRN,   Status:  Discontinued      06/08/18 2214 06/08/18 2209  XR Chest 1 View  1 Time Imaging,   Status:  Canceled      06/08/18 2214 06/08/18 2208  Blood Gas, Arterial  STAT      06/08/18 2214 06/08/18 2208  CBC Auto Differential  STAT      06/08/18 2214 06/08/18 2208  Lactic Acid, Plasma  STAT      06/08/18 2214 06/08/18 2208  Urinalysis With / Microscopic If Indicated (No Culture) - Urine, Clean Catch  STAT      06/08/18 2214 06/08/18 2208  Digoxin Level  STAT      06/08/18 2214 06/08/18 2207  Inpatient Hospitalist Consult  Once     Specialty:  Hospitalist  Provider:  Shauna Hernandez MD    06/08/18 2214 06/08/18 2207  OT Consult: Eval & Treat ambulation  Once      06/08/18 2214    06/08/18 2207  PT Consult: Eval & Treat  Once      06/08/18 2214 06/08/18 2205  Fall Precautions  Continuous,   Status:  Canceled      06/08/18 2214 06/08/18 2205  Insert  Indwelling Urinary Catheter  Once,   Status:  Canceled      06/08/18 2214 06/08/18 2205  Assess Need for Indwelling Urinary Catheter - Follow Removal Protocol  Continuous,   Status:  Canceled     Comments:  Indwelling Urinary Catheter Removal Criteria  Discontinue Indwelling Urinary Catheter Unless One of the Following is Present  Urinary Retention or Obstruction  Chronic Harvey Catheter Use  End of Life  Critical Illness with Strict I/O   Tract or Abdominal Surgery  Stage 3/4 Sacral / Perineal Wound  Required Activity Restriction: Trauma  Required Activity Restriction: Spine Surgery  If Patient is Being Followed by Urology Contact Them PRIOR to Removal  Do Not Remove Indwelling Urinary Catheter Order is Present with a CLINICAL REASON to Maintain the Catheter. Provider is Required to Include a Clinical Reason to Maintain a Urinary Catheter    Chronic Harvey Catheter Use (Present on Admission)  Assess for Continued Need & Document Medical Necessity  If Infection is Suspected, Contact the Provider        06/08/18 2214 06/08/18 2205  Catheter Care  Every Shift,   Status:  Canceled      06/08/18 2214 06/08/18 2205  Notify Physician (With Default Parameters)  Until Discontinued,   Status:  Canceled      06/08/18 2214 06/08/18 2205  NPO Diet  Diet Effective Now,   Status:  Canceled      06/08/18 2214 06/08/18 2205  Hematology & Oncology Inpatient Consult  Once,   Status:  Canceled     Specialty:  Hematology and Oncology  Provider:  Ash Ojeda MD    06/08/18 2214 06/08/18 2204  Weigh Patient  Once,   Status:  Canceled      06/08/18 2214 06/08/18 2204  Oxygen Therapy- Nasal Cannula; Titrate for SPO2: 90%  Continuous,   Status:  Canceled      06/08/18 2214 06/08/18 2204  Insert Peripheral IV  Once,   Status:  Canceled      06/08/18 2214 06/08/18 2204  Saline Lock & Maintain IV Access  Continuous,   Status:  Canceled      06/08/18 2214 06/08/18 2204  Inpatient Admission  Once       06/08/18 2214 06/08/18 2204  Full Code  Continuous,   Status:  Canceled      06/08/18 2214    06/08/18 2204  VTE Risk Assessment - Moderate Risk  Once      06/08/18 2214 06/08/18 2204  Pharmacologic VTE Prophylaxis Not Indicated: Already Ordered in This Admission, Currently Anticoagulated  Once      06/08/18 2214 06/08/18 2204  Place Compression Stockings (TEDs)  Once,   Status:  Canceled      06/08/18 2214 06/08/18 2204  Pulse Oximetry, Continuous  Continuous,   Status:  Canceled      06/08/18 2214    06/08/18 2204  Ambulate Patient  Every Shift,   Status:  Canceled      06/08/18 2214 06/08/18 2203  sodium chloride 0.9 % flush 1-10 mL  As Needed,   Status:  Discontinued      06/08/18 2214 06/08/18 2159  SLP Evaluation - Failed Bedside Dysphagia Screening  Once      06/08/18 2201 06/08/18 2113  Inpatient Nutrition Consult  Once     Provider:  (Not yet assigned)    06/08/18 2112    --  metFORMIN ER (GLUCOPHAGE-XR) 500 MG 24 hr tablet  Daily With Breakfast      06/08/18 2058    --  furosemide (LASIX) 20 MG tablet  Daily      06/08/18 2100    --  valsartan (DIOVAN) 40 MG tablet  Daily With Lunch      06/08/18 2103    --  potassium chloride (K-DUR) 10 MEQ CR tablet  Daily With Lunch      06/08/18 2103             Physician Progress Notes (last 72 hours) (Notes from 6/8/2018 10:16 AM through 6/11/2018 10:16 AM)      Taj Anderson MD at 6/10/2018  1:38 PM          Neurosurgery Daily Progress Note    Assessment:   Jay Jones is a 77 y.o. yo with a significant PMH of metastatic prostate cancer and newly identified intercranial mass.    DDX:   Active Problems:    Brain mass    Prostate cancer metastatic to bone    Aphasia    Dysphagia    Dehydration    Thrombocytopenia    Prostate cancer    Patient Active Problem List   Diagnosis   • HLD (hyperlipidemia)   • CAD (coronary artery disease)   • Atrial flutter   • Prostate cancer metastatic to bone   • Antineoplastic chemotherapy induced anemia   •  "Generalized edema   • Hypocalcemia   • Brain mass   • Aphasia   • Dysphagia   • Dehydration   • Thrombocytopenia   • Prostate cancer       Plan:   Neuro: Jay has made a remarkable recovery which favors seizures.   Cont Keppra and wean decadron   FU in 6 weeks without imaging.    Fentanyl patch for pain control given puritis with dilaudid  Defer hospice care to hospitalist and oncology.      Chief complaint:   Spine pain    Subjective:  Symptoms:  Stable.    Pain:  He reports no pain.      None    Temp:  [97.7 °F (36.5 °C)-98.3 °F (36.8 °C)] 97.9 °F (36.6 °C)  Heart Rate:  [82-98] 82  Resp:  [18] 18  BP: ()/(31-43) 104/39    Objective:  Vital signs: (most recent): Blood pressure (!) 104/39, pulse 82, temperature 97.9 °F (36.6 °C), temperature source Axillary, resp. rate 18, height 155.4 cm (61.2\"), weight 61.6 kg (135 lb 14.4 oz), SpO2 94 %.        Neurologic Exam     Mental Status   Oriented to person, place, and time.   Attention: normal. Concentration: normal.   Speech: speech is normal   Level of consciousness: alert  Knowledge: good.     Cranial Nerves     CN III, IV, VI   Pupils are equal, round, and reactive to light.  Extraocular motions are normal.     CN V   Facial sensation intact.     CN VII   Facial expression full, symmetric.     Motor Exam   Right arm pronator drift: absent  Left arm pronator drift: absent    Strength   Right deltoid: 4/5  Left deltoid: 4/5  Right biceps: 4/5  Left biceps: 4/5  Right triceps: 4/5  Left triceps: 4/5  Right iliopsoas: 4/5  Left iliopsoas: 4/5  Right quadriceps: 4/5  Left quadriceps: 4/5  Right gastroc: 4/5  Left gastroc: 4/5    Sensory Exam   Light touch normal.       Drains:      Imaging Results (last 24 hours)     ** No results found for the last 24 hours. **        Lab Results (last 24 hours)     Procedure Component Value Units Date/Time    POC Glucose Once [[id number]]  (Abnormal) Collected:  06/10/18 1117    Specimen:  Blood Updated:  06/10/18 1129     Glucose " 150 (H) mg/dL      Comment: : [id number] Fadia Avilez DestinyMeter ID: [id number]       POC Glucose Once [[id number]]  (Normal) Collected:  06/10/18 0859    Specimen:  Blood Updated:  06/10/18 0910     Glucose 129 mg/dL      Comment: : [id number] Fadia Avilez DestinyMeter ID: [id number]       Urinalysis, Microscopic Only - Urine, Clean Catch [[id number]]  (Abnormal) Collected:  06/09/18 2153    Specimen:  Urine from Urine, Clean Catch Updated:  06/09/18 2237     RBC, UA 3-5 (A) /HPF      WBC, UA 13-20 (A) /HPF      Bacteria, UA Trace (A) /HPF      Squamous Epithelial Cells, UA 0-2 /HPF      Yeast, UA Small/1+ Yeast /HPF      Hyaline Casts, UA None Seen /LPF      Mucus, UA Trace /HPF      Methodology Manual Light Microscopy    Urinalysis With / Microscopic If Indicated (No Culture) - Urine, Clean Catch [[id number]]  (Abnormal) Collected:  06/09/18 2153    Specimen:  Urine from Urine, Clean Catch Updated:  06/09/18 2227     Color, UA Yellow     Appearance, UA Clear     pH, UA 6.0     Specific Gravity, UA 1.025     Glucose, UA Negative     Ketones, UA 15 mg/dL (1+) (A)     Bilirubin, UA Small (1+) (A)     Blood, UA Negative     Protein, UA 30 mg/dL (1+) (A)     Leuk Esterase, UA Trace (A)     Nitrite, UA Negative     Urobilinogen, UA 1.0 E.U./dL    POC Glucose Once [[id number]]  (Abnormal) Collected:  06/09/18 2100    Specimen:  Blood Updated:  06/09/18 2111     Glucose 135 (H) mg/dL      Comment: : [id number] Mamie FaithMeter ID: [id number]       POC Glucose Once [[id number]]  (Abnormal) Collected:  06/09/18 1646    Specimen:  Blood Updated:  06/09/18 1700     Glucose 167 (H) mg/dL      Comment: : [id number] Fadia Lee DestinyMeter ID: [id number]             [id number]  Taj Anderson MD      Electronically signed by Taj Anderson MD at 6/10/2018  1:39 PM     Angel Guzman DO at 6/9/2018 11:47 AM              [facility] Medicine Services  INPATIENT PROGRESS  NOTE    Length of Stay: 1  Date of Admission: 6/8/2018  Primary Care Physician: Hector Garcia MD PhD    Subjective     Chief Complaint:     Aphasia, end-of-life care    HPI     The patient was seen with his wife and son present.  I had a very long discussion with the patient's family about end-of-life care.  They would like to speak with the hospice representative and with the  in order to determine what services are available to them prior to moving to comfort measures.  Until they have that discussion and make a final decision about disposition, they would like to transition the patient to a conditional CODE STATUS.      Review of Systems     All pertinent negatives and positives are as above. All other systems have been reviewed and are negative unless otherwise stated.     Objective    Temp:  [97 °F (36.1 °C)-99.4 °F (37.4 °C)] 98.9 °F (37.2 °C)  Heart Rate:  [100-113] 104  Resp:  [10-18] 10  BP: ()/(40-47) 99/42    Lab Results (last 24 hours)     Procedure Component Value Units Date/Time    POC Glucose Once [[id number]]  (Abnormal) Collected:  06/09/18 0858    Specimen:  Blood Updated:  06/09/18 0909     Glucose 158 (H) mg/dL      Comment: : [id number] Fadia Razo ID: [id number]       POC Glucose Once [[id number]]  (Abnormal) Collected:  06/08/18 2347    Specimen:  Blood Updated:  06/08/18 2358     Glucose 147 (H) mg/dL      Comment: : [id number] Meghan Gore ID: [id number]       Lactate Dehydrogenase [[id number]]  (Abnormal) Collected:  06/08/18 2241    Specimen:  Blood Updated:  06/08/18 2347     LDH 5,614 (H) U/L     CBC Auto Differential [[id number]]  (Abnormal) Collected:  06/08/18 2241    Specimen:  Blood Updated:  06/08/18 2324     WBC 7.05 10*3/mm3      RBC 2.71 (L) 10*6/mm3      Hemoglobin 8.1 (L) g/dL      Hematocrit 26.5 (L) %      MCV 97.8 (H) fL      MCH 29.9 pg      MCHC 30.6 (L) g/dL      RDW 21.6 (H) %      RDW-SD 76.1 (H) fl      MPV 9.7 fL       Platelets 83 (L) 10*3/mm3     Manual Differential [[id number]]  (Abnormal) Collected:  06/08/18 2241    Specimen:  Blood Updated:  06/08/18 2324     Neutrophil % 69.5 %      Lymphocyte % 15.8 %      Monocyte % 6.3 %      Bands %  8.4 %      Neutrophils Absolute 5.49 10*3/mm3      Lymphocytes Absolute 1.11 10*3/mm3      Monocytes Absolute 0.44 10*3/mm3      nRBC 6.3 (H) /100 WBC      Anisocytosis Slight/1+     Dacrocytes Slight/1+     Microcytes Slight/1+     Ovalocytes Slight/1+     Poikilocytes Slight/1+     Polychromasia Slight/1+     WBC Morphology Normal     Platelet Estimate Decreased    Comprehensive Metabolic Panel [[id number]]  (Abnormal) Collected:  06/08/18 2241    Specimen:  Blood Updated:  06/08/18 2319     Glucose 129 (H) mg/dL      BUN 27 (H) mg/dL      Creatinine 0.45 (L) mg/dL      Sodium 137 mmol/L      Potassium 4.7 mmol/L      Chloride 102 mmol/L      CO2 27.0 mmol/L      Calcium 6.6 (L) mg/dL      Total Protein 4.7 (L) g/dL      Albumin 2.50 (L) g/dL      ALT (SGPT) 25 U/L      AST (SGOT) 126 (H) U/L      Alkaline Phosphatase 639 (H) U/L      Total Bilirubin 0.5 mg/dL      eGFR Non African Amer >150 mL/min/1.73      Globulin 2.2 gm/dL      A/G Ratio 1.1 g/dL      BUN/Creatinine Ratio 60.0 (H)     Anion Gap 8.0 mmol/L     Narrative:       The MDRD GFR formula is only valid for adults with stable renal function between ages 18 and 70.    Blood Gas, Arterial With Co-Ox [[id number]]  (Abnormal) Collected:  06/08/18 2310    Specimen:  Arterial Blood Updated:  06/08/18 2313     Site Right Brachial     Lino's Test Positive     pH, Arterial 7.479 (H) pH units      pCO2, Arterial 31.0 (L) mm Hg      pO2, Arterial 66.7 (L) mm Hg      HCO3, Arterial 23.0 mmol/L      Base Excess, Arterial -0.3 (L) mmol/L      O2 Saturation, Arterial 94.9 %      Hemoglobin, Blood Gas 7.7 (L) g/dL      Hematocrit, Blood Gas 23.5 (L) %      Oxyhemoglobin 92.2 (L) %      Methemoglobin 0.90 %      Carboxyhemoglobin 2.0 %       Temperature 37.0 C      Sodium, Arterial 136 mmol/L      Potassium, Arterial 4.0 mmol/L      Ionized Calcium 3.77 (L) mg/dL      Barometric Pressure for Blood Gas 752 mmHg      Modality N/A     FIO2 21 %      Ventilator Mode NA     Collected by [id number]     pH, Temp Corrected -- pH Units      pCO2, Temperature Corrected -- mm Hg      pO2, Temperature Corrected -- mm Hg     Digoxin Level [[id number]]  (Abnormal) Collected:  06/08/18 2241    Specimen:  Blood Updated:  06/08/18 2311     Digoxin 0.70 (L) ng/mL     Lactic Acid, Plasma [[id number]]  (Normal) Collected:  06/08/18 2241    Specimen:  Blood Updated:  06/08/18 2311     Lactate 1.3 mmol/L           Imaging Results (last 24 hours)     ** No results found for the last 24 hours. **             Intake/Output Summary (Last 24 hours) at 06/09/18 1148  Last data filed at 06/09/18 1143   Gross per 24 hour   Intake              950 ml   Output              500 ml   Net              450 ml       Physical Exam   Constitutional: He appears well-developed. He appears cachectic. He is easily aroused. He has a sickly appearance. He appears ill. No distress.   HENT:   Head: Normocephalic and atraumatic.   Nose: Nose normal.   Mouth/Throat: Oropharynx is clear and moist. Mucous membranes are pale and dry.   Eyes: Conjunctivae are normal. Scleral icterus is present.   Neck: No JVD present. No tracheal deviation present. No thyromegaly present.   Cardiovascular: Regular rhythm and normal heart sounds.  Tachycardia present.    Pulmonary/Chest: Effort normal and breath sounds normal. No respiratory distress.   Abdominal: Soft. Bowel sounds are normal. He exhibits no distension. There is no tenderness.   Musculoskeletal: He exhibits no edema.   Neurological: He is easily aroused.   Skin: Skin is dry. No rash noted.     Results Review:  I have reviewed the labs, radiology results, and diagnostic studies since my last progress note and made treatment changes reflective of the results.   I  have reviewed the current medications.    Assessment/Plan     Hospital Problem List     Prostate cancer metastatic to bone    Brain mass    Aphasia    Dysphagia    Dehydration    Thrombocytopenia    Prostate cancer          PLAN:   consultation to discuss hospice at home as well as the possibility of SNF admission for comfort measures care.  The family is considering the above options now and will let us know what the final decision is.      Angel Guzman DO   06/09/18   11:48 AM      Electronically signed by Angel Guzman DO at 6/9/2018 11:57 AM     Taj Anderson MD at 6/9/2018 11:05 AM          Neurosurgery Daily Progress Note    Assessment:   Jay Jones is a 77 y.o. yo with a significant PMH of metastatic prostate cancer and newly identified intercranial mass.    DDX:   Active Problems:    Brain mass    Prostate cancer metastatic to bone    Aphasia    Dysphagia    Dehydration    Thrombocytopenia    Prostate cancer    Patient Active Problem List   Diagnosis   • HLD (hyperlipidemia)   • CAD (coronary artery disease)   • Atrial flutter   • Prostate cancer metastatic to bone   • Antineoplastic chemotherapy induced anemia   • Generalized edema   • Hypocalcemia   • Brain mass   • Aphasia   • Dysphagia   • Dehydration   • Thrombocytopenia   • Prostate cancer       Plan:   Neuro: Neurologically stable with somnolence and a fascia.   Keppra and Decadron.   No role for radiation or surgery.   Family does not desire MRI   Fentanyl patch for pain control given puritis with dilaudid  Defer hospice care to hospitalist and oncology.      Chief complaint:   Spine pain    Subjective  None    Temp:  [97 °F (36.1 °C)-99.4 °F (37.4 °C)] 98.9 °F (37.2 °C)  Heart Rate:  [100-113] 104  Resp:  [10-18] 10  BP: ()/(40-47) 99/42    Objective    Neurologic Exam     Mental Status   Speech: slurred   Level of consciousness: drowsy  Mummble, nonintelligble     Cranial Nerves     CN III, IV, VI   Pupils  are equal, round, and reactive to light.  Extraocular motions are normal.     CN V   Facial sensation intact.     CN VII   Facial expression full, symmetric.     Motor Exam   Right arm pronator drift: absent  Left arm pronator drift: absent    Strength   Right deltoid: 4/5  Left deltoid: 4/5  Right biceps: 4/5  Left biceps: 4/5  Right triceps: 4/5  Left triceps: 4/5  Right iliopsoas: 4/5  Left iliopsoas: 4/5  Right quadriceps: 4/5  Left quadriceps: 4/5  Right gastroc: 4/5  Left gastroc: 4/5    Sensory Exam   Light touch normal.       Drains:      Imaging Results (last 24 hours)     ** No results found for the last 24 hours. **        Lab Results (last 24 hours)     Procedure Component Value Units Date/Time    POC Glucose Once [[id number]]  (Abnormal) Collected:  06/09/18 0858    Specimen:  Blood Updated:  06/09/18 0909     Glucose 158 (H) mg/dL      Comment: : [id number] Fadia TseyMeter ID: [id number]       POC Glucose Once [[id number]]  (Abnormal) Collected:  06/08/18 2347    Specimen:  Blood Updated:  06/08/18 2358     Glucose 147 (H) mg/dL      Comment: : [id number] Meghan Phillips AMeter ID: [id number]       Lactate Dehydrogenase [[id number]]  (Abnormal) Collected:  06/08/18 2241    Specimen:  Blood Updated:  06/08/18 2347     LDH 5,614 (H) U/L     CBC Auto Differential [[id number]]  (Abnormal) Collected:  06/08/18 2241    Specimen:  Blood Updated:  06/08/18 2324     WBC 7.05 10*3/mm3      RBC 2.71 (L) 10*6/mm3      Hemoglobin 8.1 (L) g/dL      Hematocrit 26.5 (L) %      MCV 97.8 (H) fL      MCH 29.9 pg      MCHC 30.6 (L) g/dL      RDW 21.6 (H) %      RDW-SD 76.1 (H) fl      MPV 9.7 fL      Platelets 83 (L) 10*3/mm3     Manual Differential [[id number]]  (Abnormal) Collected:  06/08/18 2241    Specimen:  Blood Updated:  06/08/18 2324     Neutrophil % 69.5 %      Lymphocyte % 15.8 %      Monocyte % 6.3 %      Bands %  8.4 %      Neutrophils Absolute 5.49 10*3/mm3      Lymphocytes Absolute 1.11 10*3/mm3       Monocytes Absolute 0.44 10*3/mm3      nRBC 6.3 (H) /100 WBC      Anisocytosis Slight/1+     Dacrocytes Slight/1+     Microcytes Slight/1+     Ovalocytes Slight/1+     Poikilocytes Slight/1+     Polychromasia Slight/1+     WBC Morphology Normal     Platelet Estimate Decreased    Comprehensive Metabolic Panel [[id number]]  (Abnormal) Collected:  06/08/18 2241    Specimen:  Blood Updated:  06/08/18 2319     Glucose 129 (H) mg/dL      BUN 27 (H) mg/dL      Creatinine 0.45 (L) mg/dL      Sodium 137 mmol/L      Potassium 4.7 mmol/L      Chloride 102 mmol/L      CO2 27.0 mmol/L      Calcium 6.6 (L) mg/dL      Total Protein 4.7 (L) g/dL      Albumin 2.50 (L) g/dL      ALT (SGPT) 25 U/L      AST (SGOT) 126 (H) U/L      Alkaline Phosphatase 639 (H) U/L      Total Bilirubin 0.5 mg/dL      eGFR Non African Amer >150 mL/min/1.73      Globulin 2.2 gm/dL      A/G Ratio 1.1 g/dL      BUN/Creatinine Ratio 60.0 (H)     Anion Gap 8.0 mmol/L     Narrative:       The MDRD GFR formula is only valid for adults with stable renal function between ages 18 and 70.    Blood Gas, Arterial With Co-Ox [[id number]]  (Abnormal) Collected:  06/08/18 2310    Specimen:  Arterial Blood Updated:  06/08/18 2313     Site Right Brachial     Lino's Test Positive     pH, Arterial 7.479 (H) pH units      pCO2, Arterial 31.0 (L) mm Hg      pO2, Arterial 66.7 (L) mm Hg      HCO3, Arterial 23.0 mmol/L      Base Excess, Arterial -0.3 (L) mmol/L      O2 Saturation, Arterial 94.9 %      Hemoglobin, Blood Gas 7.7 (L) g/dL      Hematocrit, Blood Gas 23.5 (L) %      Oxyhemoglobin 92.2 (L) %      Methemoglobin 0.90 %      Carboxyhemoglobin 2.0 %      Temperature 37.0 C      Sodium, Arterial 136 mmol/L      Potassium, Arterial 4.0 mmol/L      Ionized Calcium 3.77 (L) mg/dL      Barometric Pressure for Blood Gas 752 mmHg      Modality N/A     FIO2 21 %      Ventilator Mode NA     Collected by [id number]     pH, Temp Corrected -- pH Units      pCO2, Temperature Corrected  -- mm Hg      pO2, Temperature Corrected -- mm Hg     Digoxin Level [[id number]]  (Abnormal) Collected:  06/08/18 2241    Specimen:  Blood Updated:  06/08/18 2311     Digoxin 0.70 (L) ng/mL     Lactic Acid, Plasma [[id number]]  (Normal) Collected:  06/08/18 2241    Specimen:  Blood Updated:  06/08/18 2311     Lactate 1.3 mmol/L           [id number]  Taj Anderson MD      Electronically signed by Taj Anderson MD at 6/9/2018 11:08 AM          Consult Notes (last 72 hours) (Notes from 6/8/2018 10:16 AM through 6/11/2018 10:16 AM)      Lamonte Wilkins at 6/9/2018 10:47 AM      Consult Orders:    1. Inpatient Spiritual Care Consult [[id number]] ordered by Shauna Hernandez MD at 06/08/18 7113              Patient requested visit and prayer, completed 6-9-18    Electronically signed by Lamonte Wilkins at 6/9/2018 10:49 AM     Ash Ojeda MD at 6/9/2018 10:29 AM          [facility]    HEMATOLOGY & ONCOLOGY      CONSULTATION NOTE      REASON FOR CONSULT: Prostate ca metastatic with new cranial mass  REFERRING PHYSICIAN: Taj Anderson,*    ADMISSION DIAGNOSIS  Prostate cancer [C61]      Subjective     HISTORY OF PRESENT ILLNESS:     Pt with prostate ca presented to this admission with AMS, imaging showed cranial mass concerning fr meningioma versus metastasis. On steroid, keppra and IVF. Family at bed side. Patient alert to name. Family reports this is improvement since admission.  Past Medical History:   Past Medical History:   Diagnosis Date   • Asthma     ASTHMA NOS W/ACUTE EXACERBATION   • CAD in native artery    • Cancer    • Coronary artery disease    • HLD (hyperlipidemia)     Hyperlipidemia, unspecified   • Infectious viral hepatitis     HEPATITIS A, VIRAL, W/O HEPATIC COMA   • Prostate CA 2/6/2018   • Prostate cancer metastatic to bone 2/6/2018   • Unspecified atrial flutter      Past Surgical History:   Past Surgical History:   Procedure Laterality Date   •  APPENDECTOMY     • CARDIAC CATHETERIZATION Left 11/01/2006    -with findings of three-vessel coronary artery disease with normal left ventricular function.   • CHOLECYSTECTOMY     • CORONARY ARTERY BYPASS GRAFT  11/03/2006    CABG times five with LIMA grafted in a sequential fashion to the first diagonal and left anterior descending followed by a saphenous vein graft to the first and second obtuse marginal branches and then an individual saphenous vein graft to the right coronary artery.   • OTHER SURGICAL HISTORY  02/28/2007    electrocardioversion   • PROSTATECTOMY       Social History:   Social History     Social History   • Marital status:      Spouse name: N/A   • Number of children: N/A   • Years of education: N/A     Occupational History   • Not on file.     Social History Main Topics   • Smoking status: Former Smoker     Types: Cigars     Quit date: 05/2017   • Smokeless tobacco: Never Used   • Alcohol use No   • Drug use: No   • Sexual activity: Defer     Other Topics Concern   • Not on file     Social History Narrative   • No narrative on file     Family History:   Family History   Problem Relation Age of Onset   • Heart disease Father    • Heart attack Father    • Heart failure Father    • Heart disease Maternal Grandfather    • Alzheimer's disease Mother    • Heart disease Other        Review of Systems   Constitutional: Positive for activity change, fatigue and unexpected weight change. Negative for fever.   HENT: Negative.    Eyes: Negative.    Respiratory: Positive for shortness of breath. Negative for cough, chest tightness and wheezing.    Cardiovascular: Positive for leg swelling. Negative for chest pain.   Gastrointestinal: Negative.    Endocrine: Negative.    Genitourinary: Negative.    Musculoskeletal: Positive for arthralgias.   Skin: Positive for pallor. Negative for rash.   Neurological: Negative for dizziness, facial asymmetry and light-headedness.   Hematological: Negative.     Psychiatric/Behavioral: Negative for agitation and behavioral problems.        Medications:    Current Facility-Administered Medications   Medication Dose Route Frequency Provider Last Rate Last Dose   • apixaban (ELIQUIS) tablet 5 mg  5 mg Oral Q12H Taj Anderson MD       • calcium carb-cholecalciferol 600-800 MG-UNIT tablet 1 tablet  1 tablet Oral Daily Taj Anderson MD       • dexamethasone (DECADRON) injection 4 mg  4 mg Intravenous Q6H Taj Anderson MD   4 mg at 06/09/18 0458    Followed by   • [START ON 6/12/2018] dexamethasone (DECADRON) injection 2 mg  2 mg Intravenous Q6H Taj Anderson MD        Followed by   • [START ON 6/15/2018] dexamethasone (DECADRON) injection 2 mg  2 mg Intravenous Q12H Taj Anderson MD       • dextrose (D50W) solution 25 g  25 g Intravenous Q15 Min PRN Taj Anderson MD       • dextrose (GLUTOSE) oral gel 15 g  15 g Oral Q15 Min PRN Taj Anderson MD       • digoxin (LANOXIN) tablet 125 mcg  125 mcg Oral Daily With Lunch Taj Anderson MD       • famotidine (PEPCID) tablet 40 mg  40 mg Oral Daily Taj Anderson MD       • furosemide (LASIX) tablet 20 mg  20 mg Oral Daily Taj Anderson MD       • glucagon (human recombinant) (GLUCAGEN DIAGNOSTIC) injection 1 mg  1 mg Subcutaneous PRN Taj Anderson MD       • HYDROmorphone (DILAUDID) injection 0.5 mg  0.5 mg Intravenous Q3H PRN Taj Anderson MD   0.5 mg at 06/09/18 0650   • insulin lispro (humaLOG) injection 2-7 Units  2-7 Units Subcutaneous 4x Daily With Meals & Nightly Taj Anderson MD       • levETIRAcetam in NaCl 0.82% (KEPPRA) IVPB 500 mg  500 mg Intravenous Q12H Taj Anderson MD   500 mg at 06/09/18 0859   • LORazepam (ATIVAN) injection 0.25 mg  0.25 mg Intravenous Q6H PRN DONALD Saldaña   0.25 mg at 06/08/18 2350   • metFORMIN ER (GLUCOPHAGE-XR) 24 hr tablet 500 mg  500 mg Oral Daily With Breakfast  "Taj Anderson MD       • metoprolol tartrate (LOPRESSOR) tablet 50 mg  50 mg Oral Q12H Taj Anderson MD       • ondansetron (ZOFRAN) tablet 4 mg  4 mg Oral Q6H PRN Taj Anderson MD        Or   • ondansetron ODT (ZOFRAN-ODT) disintegrating tablet 4 mg  4 mg Oral Q6H PRN Taj Anderson MD        Or   • ondansetron (ZOFRAN) injection 4 mg  4 mg Intravenous Q6H PRN Taj Anderson MD       • potassium chloride (MICRO-K) CR capsule 10 mEq  10 mEq Oral Daily With Lunch Taj Anderson MD       • sennosides-docusate sodium (SENOKOT-S) 8.6-50 MG tablet 2 tablet  2 tablet Oral BID Taj Anderson MD       • sodium chloride 0.9 % flush 1-10 mL  1-10 mL Intravenous PRN Taj Anderson MD       • sodium chloride 0.9 % with KCl 20 mEq/L infusion  100 mL/hr Intravenous Continuous Taj Anderson  mL/hr at 06/08/18 2321 100 mL/hr at 06/08/18 2321   • valsartan (DIOVAN) tablet 20 mg  20 mg Oral Daily With Lunch Taj Anderson MD           ALLERGIES:    Allergies   Allergen Reactions   • Oxycodone-Acetaminophen Unknown (See Comments)     Reaction: hallucinations   • Codeine Unknown (See Comments) and GI Intolerance   • Percocet [Oxycodone-Acetaminophen]    • Tramadol Other (See Comments)     Increased BP and HR. PATIENT STATES HE HAS BEEN TAKING THIS DRUG THIS MONTH.       Objective      Vitals:    06/09/18 0000 06/09/18 0437 06/09/18 0753 06/09/18 0918   BP: 101/45 111/40 99/42    BP Location: Right arm Right arm Right arm    Patient Position: Lying Lying Lying    Pulse: 103 113 109 104   Resp: 18 16 10    Temp: 97.6 °F (36.4 °C) 97 °F (36.1 °C) 98.9 °F (37.2 °C)    TempSrc: Oral Axillary Oral    SpO2: 97% 93% 92% 92%   Weight:       Height:         BP 99/42 (BP Location: Right arm, Patient Position: Lying)   Pulse 104   Temp 98.9 °F (37.2 °C) (Oral)   Resp 10   Ht 155.4 cm (61.2\")   Wt 61.6 kg (135 lb 14.4 oz)   SpO2 92%   BMI 25.51 kg/m²  "     Current Status 5/31/2018   ECOG score 0         General Appearance:    Pale.Alert, cooperative, no distress   Head:    Normocephalic, without obvious abnormality, atraumatic   Eyes:    PERRL, conjunctiva/corneas clear, EOM's intact, fundi     benign, both eyes   Ears:    Normal TM's and external ear canals, both ears   Nose:   Nares normal, septum midline, mucosa normal, no drainage     or sinus tenderness   Throat:   Lips, mucosa, and tongue normal; teeth and gums normal   Neck:   Supple, symmetrical, trachea midline, no adenopathy;     thyroid:  no enlargement/tenderness/nodules; no carotid    bruit or JVD   Back:     Symmetric, no curvature, ROM normal, no CVA tenderness   Lungs:     Clear to auscultation bilaterally, respirations unlabored   Chest Wall:    No tenderness or deformity    Heart:    Regular rate and rhythm, S1 and S2 normal, no murmur, rub    or gallop   Abdomen:     Soft, non-tender, bowel sounds active all four quadrants,     no masses, no organomegaly   Extremities:   Extremities normal, atraumatic, no cyanosis. Mild edema   Pulses:   2+ and symmetric all extremities   Skin:   no rashes or lesions   Lymph nodes:   Cervical, supraclavicular, and axillary nodes normal   Neurologic:   CNII-XII intact, normal strength, sensation and reflexes     throughout       RECENT LABS:  Lab Results (last 72 hours)     Procedure Component Value Units Date/Time    Anaerobic Culture - Pleural Fluid, Pleural Cavity [[id number]]  (Normal) Collected:  05/21/18 1333    Specimen:  Pleural Fluid from Pleural Cavity Updated:  05/22/18 1437     Culture No anaerobes isolated at 24 hours    CBC & Differential [[id number]] Collected:  05/22/18 1327    Specimen:  Blood Updated:  05/22/18 1427    Narrative:       The following orders were created for panel order CBC & Differential.  Procedure                               Abnormality         Status                     ---------                               -----------          ------                     Manual Differential[[id number]]                                                         CBC Auto Differential[[id number]]        Abnormal            Final result                 Please view results for these tests on the individual orders.    CBC Auto Differential [[id number]]  (Abnormal) Collected:  05/22/18 1327    Specimen:  Blood Updated:  05/22/18 1427     WBC 10.90 (H) 10*3/mm3      RBC 3.12 (L) 10*6/mm3      Hemoglobin 9.3 (L) g/dL      Hematocrit 29.6 (L) %      MCV 94.9 fL      MCH 29.8 pg      MCHC 31.4 (L) g/dL      RDW 21.8 (H) %      RDW-SD 72.8 (H) fl      MPV 8.9 fL      Platelets 117 (L) 10*3/mm3     Manual Differential [[id number]]  (Abnormal) Collected:  05/22/18 1327    Specimen:  Blood Updated:  05/22/18 1427     Neutrophil % 84.0 (H) %      Lymphocyte % 6.0 (L) %      Monocyte % 1.0 (L) %      Bands %  3.0 %      Metamyelocyte % 6.0 (H) %      Neutrophils Absolute 9.48 (H) 10*3/mm3      Lymphocytes Absolute 0.65 (L) 10*3/mm3      Monocytes Absolute 0.11 (L) 10*3/mm3      nRBC 13.0 (H) /100 WBC      Anisocytosis Slight/1+     Dacrocytes Slight/1+     Hypochromia Slight/1+     Microcytes Slight/1+     Polychromasia Slight/1+     WBC Morphology Normal     Platelet Estimate Decreased    Magnesium [[id number]]  (Normal) Collected:  05/22/18 1327    Specimen:  Blood Updated:  05/22/18 1408     Magnesium 2.1 mg/dL     Phosphorus [[id number]]  (Normal) Collected:  05/22/18 1327    Specimen:  Blood Updated:  05/22/18 1408     Phosphorus 2.9 mg/dL     Non-gynecologic Cytology [[id number]] Collected:  05/21/18 1334    Specimen:  Body Fluid from Pleural Cavity Updated:  05/22/18 1234     Case Report --     Non-gynecologic Cytology                          Case: [id number]                                  Authorizing Provider:  Mitchell Hensley MD  Collected:           05/21/2018 01:34 PM          Ordering Location:     [address]  Received:            05/21/2018  02:39 PM          Pathologist:           Mayank Sepulveda MD                                                         Specimen:    Pleural Cavity                                                                              Final Diagnosis --     Pleural fluid, thoracentesis:  Negative for malignant cells.  Reactive mesothelial cells and extensive acute inflammation.       Gross Description --       Received fresh in the laboratory in a container labeled with patient name and  designated as pleural fluid.  1300 mls of yellow fluid.  1 thin prep slide and 1 cell block made.      Electronically signed by Ash Ojeda MD at 2018 10:37 AM     DONALD Saldaña at 2018 10:26 PM      Consult Orders:    1. Inpatient Hospitalist Consult [[id number]] ordered by Taj Anderson MD at 18 2214                      [facility] Medicine Consult Note    Date of Admission: 2018  Date of Consult: 18    Primary Care Physician: Hector Garcia MD PhD  Referring Physician: Taj Anderson MD    Chief complaint/Reason for consultation: Management of failure to thrive/dehydration    History of Present Illness  Jay Jones is an unfortunate 77-year-old  male with a past medical history of metastatic prostate cancer status post prostatectomy.  Patient's wife and daughter at bedside states he has been declining over the past 3-4 months significantly so in the past month with severe weight loss, anorexia and general decline.  Wife states he was in his usual state of health last evening was talking coherently and behaving normally and awakened this morning with inability to find his words, increased agitation therefore they did seek evaluation via EMS at [facility].  Evaluation revealed brain lesion therefore patient was transferred to [facility] into the care of Dr. Anderson, who feels patient may have sphenoid wing meningioma  versus metastatic prostate cancer to the brain.  Patient has been on estrogen for several months and this is a possible etiology of meningioma as metastatic prostate cancer to the brain is rare.  Invasive procedure is not an option and he has been placed on high-dose steroids and Keppra.  Hospitalists have been consulted for general management of failure to thrive and dehydration.  Wife wished to discuss hospice care, approximately 30 minutes spent in conversation with her and daughter at bedside.  Currently patient is extremely agitated when touched by anyone other than family.  He is unable to follow any commands.  Speech is garbled.  Condition is guarded.  Family wishes for his agitation and pain to be treated.  I did explain we would have to be very cautious with the use of narcotics due to concerns for oversedation.  They verbalized understanding and wish at this time for him to remain a full code until all family members have discussed with hospice care but would like mild therapy if possible.  Hospitalists will follow.    Review of Systems   Unable to determine due to profound confusion    Past Medical History:   Past Medical History:   Diagnosis Date   • Asthma     ASTHMA NOS W/ACUTE EXACERBATION   • CAD in native artery    • Cancer    • Coronary artery disease    • HLD (hyperlipidemia)     Hyperlipidemia, unspecified   • Infectious viral hepatitis     HEPATITIS A, VIRAL, W/O HEPATIC COMA   • Prostate CA 2/6/2018   • Prostate cancer metastatic to bone 2/6/2018   • Unspecified atrial flutter        Past Surgical History:   Past Surgical History:   Procedure Laterality Date   • APPENDECTOMY     • CARDIAC CATHETERIZATION Left 11/01/2006    -with findings of three-vessel coronary artery disease with normal left ventricular function.   • CHOLECYSTECTOMY     • CORONARY ARTERY BYPASS GRAFT  11/03/2006    CABG times five with LIMA grafted in a sequential fashion to the first diagonal and left anterior descending  followed by a saphenous vein graft to the first and second obtuse marginal branches and then an individual saphenous vein graft to the right coronary artery.   • OTHER SURGICAL HISTORY  02/28/2007    electrocardioversion   • PROSTATECTOMY         Code Status: Full, his wife speaks for him    Family History: family history includes Alzheimer's disease in his mother; Heart attack in his father; Heart disease in his father, maternal grandfather, and other; Heart failure in his father.    Social History:  reports that he quit smoking about 13 months ago. His smoking use included Cigars. He has never used smokeless tobacco. He reports that he does not drink alcohol or use drugs.    Allergies:   Allergies   Allergen Reactions   • Oxycodone-Acetaminophen Unknown (See Comments)     Reaction: hallucinations   • Codeine Unknown (See Comments) and GI Intolerance   • Percocet [Oxycodone-Acetaminophen]    • Tramadol Other (See Comments)     Increased BP and HR. PATIENT STATES HE HAS BEEN TAKING THIS DRUG THIS MONTH.       Home Medications:   Prior to Admission medications    Medication Sig Start Date End Date Taking? Authorizing Provider   apixaban (ELIQUIS) 5 MG tablet tablet Take 1 tablet by mouth Every 12 (Twelve) Hours. 5/27/18  Yes Khalida Pillai PA-C   calcium carb-cholecalciferol (CALCIUM+D3) 600-800 MG-UNIT tablet Take 1 tablet by mouth Daily.   Yes Historical Provider, MD   digoxin (LANOXIN) 125 MCG tablet Take 1 tablet by mouth Daily.  Patient taking differently: Take 125 mcg by mouth Daily With Lunch. 5/27/18  Yes Khalida Pillai PA-C   estrogens, conjugated, (PREMARIN) 0.625 MG tablet Take 1 tablet by mouth Daily for 30 days. Take daily for 21 days then do not take for 7 days.  Patient taking differently: Take 0.625 mg by mouth every night at bedtime. Take daily for 21 days then do not take for 7 days. 5/10/18 6/9/18 Yes Ash Ojeda MD   furosemide (LASIX) 20 MG tablet Take 20 mg by mouth Daily.   Yes  Historical Provider, MD   metFORMIN ER (GLUCOPHAGE-XR) 500 MG 24 hr tablet Take 500 mg by mouth Daily With Breakfast.   Yes Historical Provider, MD   metoprolol tartrate (LOPRESSOR) 50 MG tablet Take 1 tablet by mouth Every 12 (Twelve) Hours. 5/27/18  Yes Khalida Pillai PA-C   Multiple Vitamins-Minerals (CENTRUM SILVER PO) Take 1 tablet by mouth Daily.   Yes Historical Provider, MD   potassium chloride (K-DUR) 10 MEQ CR tablet Take 10 mEq by mouth Daily With Lunch.   Yes Historical Provider, MD   predniSONE (DELTASONE) 5 MG tablet Take 5 mg by mouth 2 (Two) Times a Day With Meals.   Yes Historical Provider, MD   sennosides-docusate sodium (SENOKOT-S) 8.6-50 MG tablet Take 2 tablets by mouth 2 (Two) Times a Day.   Yes Historical Provider, MD   valsartan (DIOVAN) 40 MG tablet Take 20 mg by mouth Daily With Lunch. Half tablet -20mg daily at 1200   Yes Historical Provider, MD   furosemide (LASIX) 40 MG tablet TAKE 1 TABLET BY MOUTH x 5 days, then 1/2 tab daily 11/8/16 6/8/18  Historical Provider, MD   KLOR-CON 10 MEQ CR tablet TAKE 1 TABLET DAILY 11/3/16 6/8/18  Historical Provider, MD   metFORMIN XR (GLUCOPHAGE-XR) 500 MG 24 hr tablet TAKE 1 TABLET TWICE A DAY 10/20/16 6/8/18  Historical Provider, MD   valsartan (DIOVAN) 40 MG tablet TAKE 1 TABLET EVERY DAY 11/8/16 6/8/18  Historical Provider, MD       Scheduled Medications    apixaban 5 mg Oral Q12H   [START ON 6/9/2018] calcium carb-cholecalciferol 1 tablet Oral Daily   dexamethasone 10 mg Intravenous Once   Followed by      [START ON 6/9/2018] dexamethasone 4 mg Intravenous Q6H   Followed by      [START ON 6/12/2018] dexamethasone 2 mg Intravenous Q6H   Followed by      [START ON 6/15/2018] dexamethasone 2 mg Intravenous Q12H   [START ON 6/9/2018] digoxin 125 mcg Oral Daily With Lunch   [START ON 6/9/2018] estrogens (conjugated) 0.625 mg Oral Daily   [START ON 6/9/2018] famotidine 40 mg Oral Daily   [START ON 6/9/2018] furosemide 20 mg Oral Daily   insulin lispro  2-7 Units Subcutaneous 4x Daily With Meals & Nightly   levETIRAcetam 500 mg Intravenous Q12H   [START ON 6/9/2018] metFORMIN  mg Oral Daily With Breakfast   metoprolol tartrate 50 mg Oral Q12H   [START ON 6/9/2018] potassium chloride 10 mEq Oral Daily With Lunch   sennosides-docusate sodium 2 tablet Oral BID   [START ON 6/9/2018] valsartan 20 mg Oral Daily With Lunch       Pertinent Data:   Lab Results (last 72 hours)     Procedure Component Value Units Date/Time    Comprehensive Metabolic Panel [[id number]]  (Abnormal) Collected:  06/08/18 2241    Specimen:  Blood Updated:  06/08/18 2319     Glucose 129 (H) mg/dL      BUN 27 (H) mg/dL      Creatinine 0.45 (L) mg/dL      Sodium 137 mmol/L      Potassium 4.7 mmol/L      Chloride 102 mmol/L      CO2 27.0 mmol/L      Calcium 6.6 (L) mg/dL      Total Protein 4.7 (L) g/dL      Albumin 2.50 (L) g/dL      ALT (SGPT) 25 U/L      AST (SGOT) 126 (H) U/L      Alkaline Phosphatase 639 (H) U/L      Total Bilirubin 0.5 mg/dL      eGFR Non African Amer >150 mL/min/1.73      Globulin 2.2 gm/dL      A/G Ratio 1.1 g/dL      BUN/Creatinine Ratio 60.0 (H)     Anion Gap 8.0 mmol/L     Narrative:       The MDRD GFR formula is only valid for adults with stable renal function between ages 18 and 70.    Blood Gas, Arterial With Co-Ox [[id number]]  (Abnormal) Collected:  06/08/18 2310    Specimen:  Arterial Blood Updated:  06/08/18 2313     Site Right Brachial     Lino's Test Positive     pH, Arterial 7.479 (H) pH units      pCO2, Arterial 31.0 (L) mm Hg      pO2, Arterial 66.7 (L) mm Hg      HCO3, Arterial 23.0 mmol/L      Base Excess, Arterial -0.3 (L) mmol/L      O2 Saturation, Arterial 94.9 %      Hemoglobin, Blood Gas 7.7 (L) g/dL      Hematocrit, Blood Gas 23.5 (L) %      Oxyhemoglobin 92.2 (L) %      Methemoglobin 0.90 %      Carboxyhemoglobin 2.0 %      Temperature 37.0 C      Sodium, Arterial 136 mmol/L      Potassium, Arterial 4.0 mmol/L      Ionized Calcium 3.77 (L) mg/dL       Barometric Pressure for Blood Gas 752 mmHg      Modality N/A     FIO2 21 %      Ventilator Mode NA     Collected by [id number]     pH, Temp Corrected -- pH Units      pCO2, Temperature Corrected -- mm Hg      pO2, Temperature Corrected -- mm Hg     Digoxin Level [[id number]]  (Abnormal) Collected:  06/08/18 2241    Specimen:  Blood Updated:  06/08/18 2311     Digoxin 0.70 (L) ng/mL     Lactic Acid, Plasma [[id number]]  (Normal) Collected:  06/08/18 2241    Specimen:  Blood Updated:  06/08/18 2311     Lactate 1.3 mmol/L     CBC Auto Differential [[id number]]  (Abnormal) Collected:  06/08/18 2241    Specimen:  Blood Updated:  06/08/18 2307     WBC 7.05 10*3/mm3      RBC 2.71 (L) 10*6/mm3      Hemoglobin 8.1 (L) g/dL      Hematocrit 26.5 (L) %      MCV 97.8 (H) fL      MCH 29.9 pg      MCHC 30.6 (L) g/dL      RDW 21.6 (H) %      RDW-SD 76.1 (H) fl      MPV 9.7 fL      Platelets 83 (L) 10*3/mm3     Lactate Dehydrogenase [[id number]] Collected:  06/08/18 2241    Specimen:  Blood Updated:  06/08/18 2300    Manual Differential [[id number]] Collected:  06/08/18 2241    Specimen:  Blood Updated:  06/08/18 2257        Imaging Results (last 24 hours)     ** No results found for the last 24 hours. **        Temp:  [99.4 °F (37.4 °C)] 99.4 °F (37.4 °C)  Heart Rate:  [101] 101  Resp:  [18] 18  BP: (105)/(47) 105/47    Physical Exam   Constitutional: He appears well-developed. He appears distressed.   Frail and cachectic   HENT:   Head: Normocephalic and atraumatic.   Dry oral mucosa   Eyes: Pupils are equal, round, and reactive to light. Scleral icterus is present.   Neck: Normal range of motion. No JVD present.   Cardiovascular: Regular rhythm and normal heart sounds.    No murmur heard.  Tachycardic   Pulmonary/Chest: Effort normal and breath sounds normal. No respiratory distress. He has no wheezes. He has no rales.   Abdominal: Soft. Bowel sounds are normal. He exhibits no distension.   Musculoskeletal: He exhibits no edema.    Generalized weakness and debility   Neurological:   Confused, aphasic   Skin: Skin is warm and dry. Capillary refill takes less than 2 seconds.   Jaundice   Psychiatric:   Extreme agitation       Assessment:     Hospital Problem List     Prostate cancer metastatic to bone    Brain mass    Aphasia    Dysphagia    Dehydration    Thrombocytopenia    Prostate cancer      Transaminitis - concerns for liver metastases  Failure to thrive  End-of-life      Plan:   1.  Consult hospice in a.m.   2.  Add Ativan-low dose every 6 hours as needed for agitation  3.  Pain control with Dilaudid, per admitting  4.  Normal saline 20 mEq KCl 100 ML/hour  5.  Nasal cannula O2 at 2 L if patient will tolerate    I discussed the patients findings and my recommendations with: Shauna Hernandez MD  Time spent: 50 minutes    DONALD Esteban  06/08/18  10:27 PM            Electronically signed by DONALD Saldaña at 6/8/2018 11:30 PM          Discharge Summary      Angel Guzman DO at 6/10/2018 11:37 AM              [facility] Medicine Services  DISCHARGE SUMMARY       Date of Admission: 6/8/2018  Date of Discharge:  6/10/2018  Primary Care Physician: Hector Garcia MD PhD    Discharge Diagnoses:  Patient Active Problem List   Diagnosis   • HLD (hyperlipidemia)   • CAD (coronary artery disease)   • Atrial flutter   • Prostate cancer metastatic to bone   • Antineoplastic chemotherapy induced anemia   • Generalized edema   • Hypocalcemia   • Brain mass   • Aphasia   • Dysphagia   • Dehydration   • Thrombocytopenia   • Prostate cancer         Presenting Problem/History of Present Illness:  Prostate cancer [C61]     Chief Complaint on Day of Discharge:   None    History of Present Illness on Day of Discharge:   The patient is awake, alert and talkative today.  He was able to tolerate a soft diet without difficulty.  After discussion again with the family they are still in agreement that the  patient is to return home with hospice.    Hospital Course  Patient is a 77 y.o. male presented with confusion and aphasia.  Physical exam findings of word salad, shortness of breath and cachexia were noted.  Imaging from outside hospital showed a newly discovered left sphenoid wing mass with surrounding edema.  The patient was placed on high-dose IV steroids and Keppra.  No further imaging or treatment was administered as the family decided not to change comfort measures treatment strategies.  The patient was initially admitted to the neurosurgical service with a consultation to oncology.  Oncology visited the patient and had no further treatment options to discuss with the family.  Hospitalist service was consulted for medical management and accepted the patient in transfer to our services.  I had a long discussions with the family on several occasions and they elected to proceed with hospice placement and to return the patient home for end-of-life care.  The patient had been essentially obtunded throughout his stay until the day of discharge.  Became alert, awake and was talkative.  He tolerated a soft diet without difficulty.  A repeat discussion was held with the family and they were still in agreement that patient is going to return home with hospice as all treatment options have been completely exhausted.    Consults:   Oncology    Pertinent Test Results:    Urinalysis, Microscopic Only - Urine, Clean Catch [[id number]]  (Abnormal) Collected:  06/09/18 2153    Specimen:  Urine from Urine, Clean Catch Updated:  06/09/18 2237     RBC, UA 3-5 (A) /HPF      WBC, UA 13-20 (A) /HPF      Bacteria, UA Trace (A) /HPF      Squamous Epithelial Cells, UA 0-2 /HPF      Yeast, UA Small/1+ Yeast /HPF      Hyaline Casts, UA None Seen /LPF      Mucus, UA Trace /HPF      Methodology Manual Light Microscopy    Urinalysis With / Microscopic If Indicated (No Culture) - Urine, Clean Catch [[id number]]  (Abnormal) Collected:   06/09/18 2153    Specimen:  Urine from Urine, Clean Catch Updated:  06/09/18 2227     Color, UA Yellow     Appearance, UA Clear     pH, UA 6.0     Specific Gravity, UA 1.025     Glucose, UA Negative     Ketones, UA 15 mg/dL (1+) (A)     Bilirubin, UA Small (1+) (A)     Blood, UA Negative     Protein, UA 30 mg/dL (1+) (A)     Leuk Esterase, UA Trace (A)     Nitrite, UA Negative     Urobilinogen, UA 1.0 E.U./dL    Lactate Dehydrogenase [[id number]]  (Abnormal) Collected:  06/08/18 2241    Specimen:  Blood Updated:  06/08/18 2347     LDH 5,614 (H) U/L     CBC Auto Differential [[id number]]  (Abnormal) Collected:  06/08/18 2241    Specimen:  Blood Updated:  06/08/18 2324     WBC 7.05 10*3/mm3      RBC 2.71 (L) 10*6/mm3      Hemoglobin 8.1 (L) g/dL      Hematocrit 26.5 (L) %      MCV 97.8 (H) fL      MCH 29.9 pg      MCHC 30.6 (L) g/dL      RDW 21.6 (H) %      RDW-SD 76.1 (H) fl      MPV 9.7 fL      Platelets 83 (L) 10*3/mm3     Manual Differential [[id number]]  (Abnormal) Collected:  06/08/18 2241    Specimen:  Blood Updated:  06/08/18 2324     Neutrophil % 69.5 %      Lymphocyte % 15.8 %      Monocyte % 6.3 %      Bands %  8.4 %      Neutrophils Absolute 5.49 10*3/mm3      Lymphocytes Absolute 1.11 10*3/mm3      Monocytes Absolute 0.44 10*3/mm3      nRBC 6.3 (H) /100 WBC      Anisocytosis Slight/1+     Dacrocytes Slight/1+     Microcytes Slight/1+     Ovalocytes Slight/1+     Poikilocytes Slight/1+     Polychromasia Slight/1+     WBC Morphology Normal     Platelet Estimate Decreased    Comprehensive Metabolic Panel [[id number]]  (Abnormal) Collected:  06/08/18 2241    Specimen:  Blood Updated:  06/08/18 2319     Glucose 129 (H) mg/dL      BUN 27 (H) mg/dL      Creatinine 0.45 (L) mg/dL      Sodium 137 mmol/L      Potassium 4.7 mmol/L      Chloride 102 mmol/L      CO2 27.0 mmol/L      Calcium 6.6 (L) mg/dL      Total Protein 4.7 (L) g/dL      Albumin 2.50 (L) g/dL      ALT (SGPT) 25 U/L      AST (SGOT) 126 (H) U/L       "Alkaline Phosphatase 639 (H) U/L      Total Bilirubin 0.5 mg/dL      eGFR Non African Amer >150 mL/min/1.73      Globulin 2.2 gm/dL      A/G Ratio 1.1 g/dL      BUN/Creatinine Ratio 60.0 (H)     Anion Gap 8.0 mmol/L     Narrative:       The MDRD GFR formula is only valid for adults with stable renal function between ages 18 and 70.    Blood Gas, Arterial With Co-Ox [[id number]]  (Abnormal) Collected:  06/08/18 2310    Specimen:  Arterial Blood Updated:  06/08/18 2313     Site Right Brachial     Lino's Test Positive     pH, Arterial 7.479 (H) pH units      pCO2, Arterial 31.0 (L) mm Hg      pO2, Arterial 66.7 (L) mm Hg      HCO3, Arterial 23.0 mmol/L      Base Excess, Arterial -0.3 (L) mmol/L      O2 Saturation, Arterial 94.9 %      Hemoglobin, Blood Gas 7.7 (L) g/dL      Hematocrit, Blood Gas 23.5 (L) %      Oxyhemoglobin 92.2 (L) %      Methemoglobin 0.90 %      Carboxyhemoglobin 2.0 %      Temperature 37.0 C      Sodium, Arterial 136 mmol/L      Potassium, Arterial 4.0 mmol/L      Ionized Calcium 3.77 (L) mg/dL      Barometric Pressure for Blood Gas 752 mmHg      Modality N/A     FIO2 21 %      Ventilator Mode NA     Collected by [id number]     pH, Temp Corrected -- pH Units      pCO2, Temperature Corrected -- mm Hg      pO2, Temperature Corrected -- mm Hg     Digoxin Level [[id number]]  (Abnormal) Collected:  06/08/18 2241    Specimen:  Blood Updated:  06/08/18 2311     Digoxin 0.70 (L) ng/mL     Lactic Acid, Plasma [[id number]]  (Normal) Collected:  06/08/18 2241    Specimen:  Blood Updated:  06/08/18 2311     Lactate 1.3 mmol/L         Condition on Discharge:    Stable    Physical Exam on Discharge:  BP (!) 104/39 (BP Location: Right arm, Patient Position: Lying)   Pulse 82   Temp 97.9 °F (36.6 °C) (Axillary)   Resp 18   Ht 155.4 cm (61.2\")   Wt 61.6 kg (135 lb 14.4 oz)   SpO2 94%   BMI 25.51 kg/m²    Physical Exam  Constitutional: He appears well-developed. He appears cachectic. He is easily aroused. He has a " sickly appearance.  No distress.   HENT:   Head: Normocephalic and atraumatic.   Nose: Nose normal.   Mouth/Throat: Oropharynx is clear and moist. Mucous membranes are pale and dry.   Eyes: Conjunctivae are normal. Scleral icterus is present.   Neck: No JVD present. No tracheal deviation present. No thyromegaly present.   Cardiovascular: Regular rhythm and normal heart sounds.     Pulmonary/Chest: Effort normal and breath sounds normal. No respiratory distress.   Abdominal: Soft. Bowel sounds are normal. He exhibits no distension. There is no tenderness.   Musculoskeletal: He exhibits no edema.   Neurological: He is awake and talkative and is oriented to person and place.  Skin: Skin is dry. No rash noted.      Discharge Disposition:  Home or Self Care    Discharge Medications:   Robert Jay   Home Medication Instructions FELIPE:[id number]    Printed on:06/10/18 0886   Medication Information                      apixaban (ELIQUIS) 5 MG tablet tablet  Take 1 tablet by mouth Every 12 (Twelve) Hours.             digoxin (LANOXIN) 125 MCG tablet  Take 1 tablet by mouth Daily.             fentaNYL (DURAGESIC) 12 MCG/HR  Place 1 patch on the skin Every 72 (Seventy-Two) Hours for 3 doses.             furosemide (LASIX) 20 MG tablet  Take 20 mg by mouth Daily.             metFORMIN ER (GLUCOPHAGE-XR) 500 MG 24 hr tablet  Take 500 mg by mouth Daily With Breakfast.             metoprolol tartrate (LOPRESSOR) 50 MG tablet  Take 1 tablet by mouth Every 12 (Twelve) Hours.             Multiple Vitamins-Minerals (CENTRUM SILVER PO)  Take 1 tablet by mouth Daily.             potassium chloride (K-DUR) 10 MEQ CR tablet  Take 10 mEq by mouth Daily With Lunch.             PredniSONE (DELTASONE) 10 MG (48) tablet pack  As directed             sennosides-docusate sodium (SENOKOT-S) 8.6-50 MG tablet  Take 2 tablets by mouth 2 (Two) Times a Day.             valsartan (DIOVAN) 40 MG tablet  Take 20 mg by mouth Daily With Lunch. Half tablet  -20mg daily at 1200                 Discharge Diet:   Diet Instructions     Advance Diet As Tolerated             Discharge Care Plan / Instructions:   Discharge home with hospice    Activity at Discharge:   Activity Instructions     Activity as Tolerated              Angel Guzman DO  06/10/18  11:37 AM    Time: Discharge Less than 30 min    Please note that portions of this note may have been completed with a voice recognition program. Efforts were made to edit the dictations, but occasionally words are mistranscribed.            Electronically signed by Angel Guzman DO at 6/10/2018 11:44 AM

## 2018-06-28 ENCOUNTER — APPOINTMENT (OUTPATIENT)
Dept: LAB | Facility: HOSPITAL | Age: 78
End: 2018-06-28
Attending: INTERNAL MEDICINE

## 2018-06-28 ENCOUNTER — APPOINTMENT (OUTPATIENT)
Dept: ONCOLOGY | Facility: HOSPITAL | Age: 78
End: 2018-06-28
Attending: INTERNAL MEDICINE

## 2018-07-02 LAB
FUNGUS WND CULT: NORMAL
MYCOBACTERIUM SPEC CULT: NORMAL
NIGHT BLUE STAIN TISS: NORMAL
NIGHT BLUE STAIN TISS: NORMAL